# Patient Record
Sex: MALE | Race: WHITE | NOT HISPANIC OR LATINO | Employment: FULL TIME | URBAN - METROPOLITAN AREA
[De-identification: names, ages, dates, MRNs, and addresses within clinical notes are randomized per-mention and may not be internally consistent; named-entity substitution may affect disease eponyms.]

---

## 2023-11-17 DIAGNOSIS — I50.9 CONGESTIVE HEART FAILURE, UNSPECIFIED HF CHRONICITY, UNSPECIFIED HEART FAILURE TYPE (MULTI): Primary | ICD-10-CM

## 2023-11-20 RX ORDER — CARVEDILOL 3.12 MG/1
3.12 TABLET ORAL
Qty: 180 TABLET | Refills: 3 | Status: SHIPPED | OUTPATIENT
Start: 2023-11-20

## 2024-03-22 PROBLEM — R97.20 ELEVATED PSA: Status: ACTIVE | Noted: 2024-03-22

## 2024-03-22 PROBLEM — Z95.2 S/P TAVR (TRANSCATHETER AORTIC VALVE REPLACEMENT): Status: ACTIVE | Noted: 2024-03-22

## 2024-03-22 PROBLEM — F32.A DEPRESSION: Status: ACTIVE | Noted: 2024-03-22

## 2024-03-22 PROBLEM — H52.4 MYOPIA WITH PRESBYOPIA: Status: ACTIVE | Noted: 2024-03-22

## 2024-03-22 PROBLEM — D18.01 HEMANGIOMA OF SKIN AND SUBCUTANEOUS TISSUE: Status: ACTIVE | Noted: 2021-07-22

## 2024-03-22 PROBLEM — E05.90 HYPERTHYROIDISM: Status: ACTIVE | Noted: 2024-03-22

## 2024-03-22 PROBLEM — H52.13 MYOPIA OF BOTH EYES: Status: ACTIVE | Noted: 2024-03-22

## 2024-03-22 PROBLEM — H57.8A9 SENSATION OF FOREIGN BODY IN EYE: Status: ACTIVE | Noted: 2024-03-22

## 2024-03-22 PROBLEM — M79.2 NEUROPATHIC PAIN OF RIGHT LOWER EXTREMITY: Status: ACTIVE | Noted: 2024-03-22

## 2024-03-22 PROBLEM — L57.9 SKIN CHANGES DUE TO CHRONIC EXPOSURE TO NONIONIZING RADIATION, UNSPECIFIED: Status: ACTIVE | Noted: 2021-07-22

## 2024-03-22 PROBLEM — R51.9 HEADACHE: Status: ACTIVE | Noted: 2024-03-22

## 2024-03-22 PROBLEM — I25.10 ARTERIOSCLEROTIC CARDIOVASCULAR DISEASE (ASCVD): Status: ACTIVE | Noted: 2024-03-22

## 2024-03-22 PROBLEM — E78.00 HYPERCHOLESTEROLEMIA: Status: ACTIVE | Noted: 2024-03-22

## 2024-03-22 PROBLEM — H02.889 MGD (MEIBOMIAN GLAND DISEASE): Status: ACTIVE | Noted: 2024-03-22

## 2024-03-22 PROBLEM — R31.9 HEMATURIA: Status: ACTIVE | Noted: 2024-03-22

## 2024-03-22 PROBLEM — D22.39 MELANOCYTIC NEVI OF OTHER PARTS OF FACE: Status: ACTIVE | Noted: 2021-07-22

## 2024-03-22 PROBLEM — I10 ACCELERATED HYPERTENSION: Status: ACTIVE | Noted: 2024-03-22

## 2024-03-22 PROBLEM — I10 ESSENTIAL HYPERTENSION: Status: ACTIVE | Noted: 2024-03-22

## 2024-03-22 PROBLEM — H52.10 MYOPIA WITH PRESBYOPIA: Status: ACTIVE | Noted: 2024-03-22

## 2024-03-22 PROBLEM — S61.011A LACERATION OF RIGHT THUMB: Status: ACTIVE | Noted: 2024-03-22

## 2024-03-22 PROBLEM — M75.50 SUBACROMIAL BURSITIS: Status: ACTIVE | Noted: 2024-03-22

## 2024-03-22 PROBLEM — I69.359 HEMIPARESIS AFFECTING DOMINANT SIDE AS LATE EFFECT OF STROKE (MULTI): Status: ACTIVE | Noted: 2024-03-22

## 2024-03-22 PROBLEM — R10.9 ABDOMINAL PAIN: Status: ACTIVE | Noted: 2024-03-22

## 2024-03-22 PROBLEM — L82.0 INFLAMED SEBORRHEIC KERATOSIS: Status: ACTIVE | Noted: 2021-07-22

## 2024-03-22 PROBLEM — R91.1 PULMONARY NODULE: Status: ACTIVE | Noted: 2024-03-22

## 2024-03-22 PROBLEM — I63.9 STROKE (MULTI): Status: ACTIVE | Noted: 2024-03-22

## 2024-03-22 PROBLEM — M79.604 PAIN OF RIGHT LOWER EXTREMITY: Status: ACTIVE | Noted: 2024-03-22

## 2024-03-22 PROBLEM — H00.011 HORDEOLUM EXTERNUM OF RIGHT UPPER EYELID: Status: ACTIVE | Noted: 2024-03-22

## 2024-03-22 PROBLEM — I25.2 HISTORY OF NON-ST ELEVATION MYOCARDIAL INFARCTION (NSTEMI): Status: ACTIVE | Noted: 2024-03-22

## 2024-03-22 PROBLEM — S43.409A SPRAIN OF SHOULDER: Status: ACTIVE | Noted: 2024-03-22

## 2024-03-22 PROBLEM — N20.0 NEPHROLITHIASIS: Status: ACTIVE | Noted: 2024-03-22

## 2024-03-22 PROBLEM — N42.9 PROSTATE DISORDER: Status: ACTIVE | Noted: 2024-03-22

## 2024-03-22 PROBLEM — L82.1 OTHER SEBORRHEIC KERATOSIS: Status: ACTIVE | Noted: 2021-07-22

## 2024-03-22 RX ORDER — CHOLECALCIFEROL (VITAMIN D3) 25 MCG
1000 TABLET ORAL DAILY
COMMUNITY
Start: 2021-05-17

## 2024-03-22 RX ORDER — LOSARTAN POTASSIUM 50 MG/1
50 TABLET ORAL DAILY
COMMUNITY

## 2024-03-22 RX ORDER — ATORVASTATIN CALCIUM 80 MG/1
40 TABLET, FILM COATED ORAL NIGHTLY
COMMUNITY
End: 2024-06-06 | Stop reason: DRUGHIGH

## 2024-03-22 RX ORDER — NAPROXEN SODIUM 220 MG/1
1 TABLET, FILM COATED ORAL DAILY
COMMUNITY
Start: 2021-07-20

## 2024-03-22 RX ORDER — AMLODIPINE BESYLATE 5 MG/1
5 TABLET ORAL DAILY
COMMUNITY
End: 2024-05-10

## 2024-04-01 ENCOUNTER — OFFICE VISIT (OUTPATIENT)
Dept: PULMONOLOGY | Facility: CLINIC | Age: 69
End: 2024-04-01
Payer: MEDICARE

## 2024-04-01 ENCOUNTER — LAB (OUTPATIENT)
Dept: LAB | Facility: LAB | Age: 69
End: 2024-04-01
Payer: MEDICARE

## 2024-04-01 VITALS
HEART RATE: 53 BPM | SYSTOLIC BLOOD PRESSURE: 128 MMHG | HEIGHT: 70 IN | BODY MASS INDEX: 23.88 KG/M2 | OXYGEN SATURATION: 98 % | TEMPERATURE: 97.2 F | DIASTOLIC BLOOD PRESSURE: 57 MMHG | WEIGHT: 166.8 LBS

## 2024-04-01 DIAGNOSIS — H91.93 BILATERAL HEARING LOSS, UNSPECIFIED HEARING LOSS TYPE: Primary | ICD-10-CM

## 2024-04-01 DIAGNOSIS — I63.9 CEREBROVASCULAR ACCIDENT (CVA), UNSPECIFIED MECHANISM (MULTI): ICD-10-CM

## 2024-04-01 DIAGNOSIS — E78.00 HYPERCHOLESTEROLEMIA: ICD-10-CM

## 2024-04-01 DIAGNOSIS — R97.20 ELEVATED PSA: ICD-10-CM

## 2024-04-01 DIAGNOSIS — H91.93 BILATERAL HEARING LOSS, UNSPECIFIED HEARING LOSS TYPE: ICD-10-CM

## 2024-04-01 DIAGNOSIS — R31.9 HEMATURIA, UNSPECIFIED TYPE: ICD-10-CM

## 2024-04-01 DIAGNOSIS — I10 ACCELERATED HYPERTENSION: ICD-10-CM

## 2024-04-01 LAB
ALBUMIN SERPL BCP-MCNC: 4.2 G/DL (ref 3.4–5)
ALP SERPL-CCNC: 47 U/L (ref 33–136)
ALT SERPL W P-5'-P-CCNC: 16 U/L (ref 10–52)
ANION GAP SERPL CALC-SCNC: 15 MMOL/L (ref 10–20)
APPEARANCE UR: CLEAR
AST SERPL W P-5'-P-CCNC: 17 U/L (ref 9–39)
BILIRUB SERPL-MCNC: 1.1 MG/DL (ref 0–1.2)
BILIRUB UR STRIP.AUTO-MCNC: NEGATIVE MG/DL
BUN SERPL-MCNC: 23 MG/DL (ref 6–23)
CALCIUM SERPL-MCNC: 9.7 MG/DL (ref 8.6–10.6)
CHLORIDE SERPL-SCNC: 109 MMOL/L (ref 98–107)
CHOLEST SERPL-MCNC: 124 MG/DL (ref 0–199)
CHOLESTEROL/HDL RATIO: 2
CO2 SERPL-SCNC: 28 MMOL/L (ref 21–32)
COLOR UR: NORMAL
CREAT SERPL-MCNC: 1.19 MG/DL (ref 0.5–1.3)
EGFRCR SERPLBLD CKD-EPI 2021: 65 ML/MIN/1.73M*2
ERYTHROCYTE [DISTWIDTH] IN BLOOD BY AUTOMATED COUNT: 13.3 % (ref 11.5–14.5)
GLUCOSE SERPL-MCNC: 97 MG/DL (ref 74–99)
GLUCOSE UR STRIP.AUTO-MCNC: NORMAL MG/DL
HCT VFR BLD AUTO: 39.5 % (ref 41–52)
HDLC SERPL-MCNC: 60.8 MG/DL
HGB BLD-MCNC: 12.8 G/DL (ref 13.5–17.5)
KETONES UR STRIP.AUTO-MCNC: NEGATIVE MG/DL
LDLC SERPL CALC-MCNC: 55 MG/DL
LEUKOCYTE ESTERASE UR QL STRIP.AUTO: NEGATIVE
MCH RBC QN AUTO: 32.7 PG (ref 26–34)
MCHC RBC AUTO-ENTMCNC: 32.4 G/DL (ref 32–36)
MCV RBC AUTO: 101 FL (ref 80–100)
NITRITE UR QL STRIP.AUTO: NEGATIVE
NON HDL CHOLESTEROL: 63 MG/DL (ref 0–149)
NRBC BLD-RTO: 0 /100 WBCS (ref 0–0)
PH UR STRIP.AUTO: 5 [PH]
PLATELET # BLD AUTO: 149 X10*3/UL (ref 150–450)
POTASSIUM SERPL-SCNC: 5.6 MMOL/L (ref 3.5–5.3)
PROT SERPL-MCNC: 6.4 G/DL (ref 6.4–8.2)
PROT UR STRIP.AUTO-MCNC: NEGATIVE MG/DL
PSA SERPL-MCNC: 1.91 NG/ML
RBC # BLD AUTO: 3.91 X10*6/UL (ref 4.5–5.9)
RBC # UR STRIP.AUTO: NEGATIVE /UL
SODIUM SERPL-SCNC: 146 MMOL/L (ref 136–145)
SP GR UR STRIP.AUTO: 1.02
TRIGL SERPL-MCNC: 43 MG/DL (ref 0–149)
TSH SERPL-ACNC: 1.92 MIU/L (ref 0.44–3.98)
UROBILINOGEN UR STRIP.AUTO-MCNC: NORMAL MG/DL
VLDL: 9 MG/DL (ref 0–40)
WBC # BLD AUTO: 6.6 X10*3/UL (ref 4.4–11.3)

## 2024-04-01 PROCEDURE — 85027 COMPLETE CBC AUTOMATED: CPT

## 2024-04-01 PROCEDURE — 84153 ASSAY OF PSA TOTAL: CPT

## 2024-04-01 PROCEDURE — 3074F SYST BP LT 130 MM HG: CPT | Performed by: INTERNAL MEDICINE

## 2024-04-01 PROCEDURE — 80053 COMPREHEN METABOLIC PANEL: CPT

## 2024-04-01 PROCEDURE — 84443 ASSAY THYROID STIM HORMONE: CPT

## 2024-04-01 PROCEDURE — 80061 LIPID PANEL: CPT

## 2024-04-01 PROCEDURE — 36415 COLL VENOUS BLD VENIPUNCTURE: CPT

## 2024-04-01 PROCEDURE — 1036F TOBACCO NON-USER: CPT | Performed by: INTERNAL MEDICINE

## 2024-04-01 PROCEDURE — 3078F DIAST BP <80 MM HG: CPT | Performed by: INTERNAL MEDICINE

## 2024-04-01 PROCEDURE — 81003 URINALYSIS AUTO W/O SCOPE: CPT

## 2024-04-01 PROCEDURE — 99214 OFFICE O/P EST MOD 30 MIN: CPT | Performed by: INTERNAL MEDICINE

## 2024-04-01 PROCEDURE — 1159F MED LIST DOCD IN RCRD: CPT | Performed by: INTERNAL MEDICINE

## 2024-04-01 PROCEDURE — 1160F RVW MEDS BY RX/DR IN RCRD: CPT | Performed by: INTERNAL MEDICINE

## 2024-04-01 ASSESSMENT — ENCOUNTER SYMPTOMS
HEMATURIA: 0
BRUISES/BLEEDS EASILY: 0
EYE DISCHARGE: 0
UNEXPECTED WEIGHT CHANGE: 0
CHOKING: 0
PALPITATIONS: 0
SINUS PAIN: 0
ADENOPATHY: 0
DYSURIA: 0
ARTHRALGIAS: 0
APNEA: 0
AGITATION: 0
WEAKNESS: 0
STRIDOR: 0
FATIGUE: 0
SLEEP DISTURBANCE: 0
RHINORRHEA: 0
JOINT SWELLING: 0
TREMORS: 0
ABDOMINAL PAIN: 0
COUGH: 0
NUMBNESS: 0
DIFFICULTY URINATING: 0
SINUS PRESSURE: 0
FEVER: 0
ABDOMINAL DISTENTION: 0
SPEECH DIFFICULTY: 0
WHEEZING: 0
NERVOUS/ANXIOUS: 0
SHORTNESS OF BREATH: 0
SORE THROAT: 1
LIGHT-HEADEDNESS: 0
FREQUENCY: 0
CONSTIPATION: 0
HEADACHES: 0
FACIAL SWELLING: 0
NAUSEA: 0
EYE REDNESS: 0
DIZZINESS: 0

## 2024-04-01 NOTE — PROGRESS NOTES
@PULMONARY FOLLOW-UP@       PROBLEM: multiple jazmin issues    ASSESSMENT:  The patient is a 69-year-old with a history of BRACA positivity and a history of Graves' disease and hyperthyroidism.  He had a hemorrhagic stroke in 2016 and a myocardial infarction in 1999 with a acute plaque rupture.  He had a TAVR performed in June 2021 and after the procedure had intra ventricular bleed and was observed by neurosurgery without intervention.  He has been stable since then but has not had any preventative care.  He is complaining of decrease in hearing and his last colonoscopy was 10 years ago.  I discussed with him that he really needs a primary care physician to follow all of his multiple health issues.  He definitely needs regular follow-up.    PLAN:  Would obtain complete labs    Would see Edith Means for hearing       Would see  Dr. Antoine Medeiros as a primary internist     You should follow up with Dr. Lai for your heart     He is over due for colonoscopy in view of his BRACA positivity       HISTORY OF PRESENT ILLNESS:  The patient is a 69-year-old with a history of Graves' disease and hyperthyroidism, hemorrhagic stroke with right hemiparesis in 2016, stable pulmonary nodule, history of CAD with a prior myocardial infarction 1999 with acute plaque rupture in the LAD. Also, he is BRACA positive. He also has hypertension hyperlipidemia with aortic stenosis undergoing a TAVR on June 29, 2021. He subsequently presented on July 18, 2021 with 3 days of headache and nausea, body aches and low-grade fever. He was determined to have an intraventricular bleed and was admitted to the neurosurgical intensive care unit for observation. His Plavix was held and he was stable after 2 days and was discharged on July 20, 2021. Around 3 days later he was having increasing and persistent headache and a repeat CT scan was obtained on July 23, 2021 which revealed decreasing intraventricular blood. At that point it was  felt that he was stable to resume his Plavix.  It was noted on September 23, 2022 he had COVID and was treated with Paxlovid.  He has also noted that the TAVR CT scan from 8/12/2021 revealed pulm nodularity unchanged compared to 2017.    The patient reports that he has not been seen in follow up since his TAVR several years ago.  He has been concerned about COVID and hence hasn't gone to clinicians.  He had a slight tickle in his throat about a week ago but has not come down with any infection.  He has no chest pains or pressures PND orthopnea.  He has no swelling of his ankles.  His weight is stable.  He has some decrease in hearing and was concerned about that.  He has not had any labs for a long time and he has not had a colonoscopy for 10 years.  He did have COVID about a year and a half ago.  He had a pulmonary nodule and on his last CT scan from 2021 it was noted that he had not changed compared to 2017 and no further scanning was needed.  He has had an elevated PSA in the past.      Allergies   Allergen Reactions    Penicillin Unknown          Current Outpatient Medications:     amLODIPine (Norvasc) 5 mg tablet, Take 1 tablet (5 mg) by mouth once daily., Disp: , Rfl:     aspirin 81 mg chewable tablet, Chew 1 tablet (81 mg) once daily., Disp: , Rfl:     atorvastatin (Lipitor) 80 mg tablet, Take 1 tablet (80 mg) by mouth once daily at bedtime., Disp: , Rfl:     carvedilol (Coreg) 3.125 mg tablet, TAKE 1 TABLET BY MOUTH TWICE A DAY WITH MEALS, Disp: 180 tablet, Rfl: 3    cholecalciferol (Vitamin D-3) 25 MCG (1000 UT) tablet, Take 1 tablet (1,000 Units) by mouth once daily., Disp: , Rfl:     citalopram (CeleXA) 20 mg tablet, TAKE 1 TABLET BY MOUTH EVERY DAY, Disp: 90 tablet, Rfl: 3    losartan (Cozaar) 50 mg tablet, Take 1 tablet (50 mg) by mouth once daily., Disp: , Rfl:        Review of Systems   Constitutional:  Negative for fatigue, fever and unexpected weight change.   HENT:  Positive for hearing loss and  sore throat. Negative for congestion, facial swelling, nosebleeds, postnasal drip, rhinorrhea, sinus pressure and sinus pain.    Eyes:  Negative for discharge, redness and visual disturbance.   Respiratory:  Negative for apnea, cough, choking, shortness of breath, wheezing and stridor.    Cardiovascular:  Negative for chest pain, palpitations and leg swelling.   Gastrointestinal:  Negative for abdominal distention, abdominal pain, constipation and nausea.   Endocrine: Negative for cold intolerance and heat intolerance.   Genitourinary:  Negative for difficulty urinating, dysuria, frequency and hematuria.   Musculoskeletal:  Negative for arthralgias, gait problem and joint swelling.   Allergic/Immunologic: Negative for environmental allergies, food allergies and immunocompromised state.   Neurological:  Negative for dizziness, tremors, syncope, speech difficulty, weakness, light-headedness, numbness and headaches.   Hematological:  Negative for adenopathy. Does not bruise/bleed easily.   Psychiatric/Behavioral:  Negative for agitation, behavioral problems and sleep disturbance. The patient is not nervous/anxious.         Vitals:    04/01/24 0857   BP: 128/57   Pulse: 53   Temp: 36.2 °C (97.2 °F)   SpO2: 98%        Physical Exam  Vitals reviewed.   Constitutional:       Appearance: Normal appearance.   HENT:      Head: Normocephalic and atraumatic.      Right Ear: There is impacted cerumen.   Eyes:      Extraocular Movements: Extraocular movements intact.   Cardiovascular:      Rate and Rhythm: Normal rate and regular rhythm.      Heart sounds: No murmur heard.     No friction rub. No gallop.   Pulmonary:      Effort: Pulmonary effort is normal. No respiratory distress.      Breath sounds: Normal breath sounds. No stridor. No wheezing, rhonchi or rales.   Chest:      Chest wall: No tenderness.   Abdominal:      General: Abdomen is flat. There is no distension.      Palpations: Abdomen is soft. There is no mass.       Tenderness: There is no abdominal tenderness.   Musculoskeletal:         General: Normal range of motion.      Cervical back: Normal range of motion.      Right lower leg: No edema.      Left lower leg: No edema.   Skin:     General: Skin is warm and dry.   Neurological:      Mental Status: He is alert and oriented to person, place, and time.      Motor: Weakness present.   Psychiatric:         Mood and Affect: Mood normal.         Behavior: Behavior normal.

## 2024-04-01 NOTE — PATIENT INSTRUCTIONS
Would obtain complete labs    Would see Edith Santos for hearing       Would see  Dr. Antoine Medeiros as a primary internist     You should follow up with Dr. Lai for your heart

## 2024-04-04 DIAGNOSIS — E87.5 HYPERKALEMIA: Primary | ICD-10-CM

## 2024-05-10 DIAGNOSIS — I10 ESSENTIAL HYPERTENSION: Primary | ICD-10-CM

## 2024-05-10 RX ORDER — AMLODIPINE BESYLATE 5 MG/1
5 TABLET ORAL DAILY
Qty: 90 TABLET | Refills: 3 | Status: SHIPPED | OUTPATIENT
Start: 2024-05-10

## 2024-06-05 NOTE — PROGRESS NOTES
Primary Care Physician: Alejandro Villeda MD MPH  Date of Visit: 06/06/2024  8:20 AM EDT  Location of visit: [facility] [address]   Last office visit: August 9, 2021    Chief Complaint:     Follow-up CAD, status post TAVR (3-year)    HPI/Summary  Nasir Camacho is a 69 y.o. male who presents for followup cardiology evaluation.     In 1999, the patient sustained non-STEMI secondary to acute LAD plaque rupture, and underwent PCI stent.  In 2016, he presented with hemorrhagic stroke and right hemiparesis.  In 2021, he presented with progressive aortic stenosis.  Catheterization showed mild nonobstructive CAD.  He underwent TAVR on June 29, 2021.  Unfortunately, he was readmitted 3 weeks later with intraventricular hemorrhage.  He did not require acute neurosurgical treatment.  However, he did require another emergency room visit for headache, associated with a markedly elevated blood pressure.  At his last visit, we added amlodipine.  No recent cardiology follow-up.  He was seen by pulmonary medicine in April, 2024 and was advised to return to cardiology clinic and also to establish with a primary care provider.    He is now on amlodipine 5 mg daily, low-dose aspirin, high-dose atorvastatin, carvedilol 3.125 mg twice daily, and losartan 50 mg daily.    Feels fine. No angina. Exercise is limited. Back at a part-time job. Does have a gym membership. TM but not recently. Weights.  Compliant with medications.      Specialty Problems          Cardiology Problems    Accelerated hypertension    Arteriosclerotic cardiovascular disease (ASCVD)    Essential hypertension    History of non-ST elevation myocardial infarction (NSTEMI)    Hypercholesterolemia    S/P TAVR (transcatheter aortic valve replacement)        Past Medical History:   Diagnosis Date    Personal history of other diseases of the circulatory system 05/17/2021    History of aortic valve stenosis    Personal history of other endocrine, nutritional and metabolic disease   "   History of Graves' disease          Past Surgical History:   Procedure Laterality Date    ANKLE SURGERY  10/06/2014    Ankle Surgery    CORONARY ANGIOPLASTY WITH STENT PLACEMENT  10/06/2014    Cath Stent Placement    CT ANGIO NECK  7/18/2021    CT NECK ANGIO W AND WO IV CONTRAST 7/18/2021 [facility] CLINICAL LEGACY    CT HEAD ANGIO W AND WO IV CONTRAST  7/18/2021    CT HEAD ANGIO W AND WO IV CONTRAST 7/18/2021 [facility] CLINICAL LEGACY    FEMUR FRACTURE SURGERY  11/06/2014    Femur Repair            Social History     Tobacco Use    Smoking status: Never    Smokeless tobacco: Never   Substance Use Topics    Alcohol use: Yes     Comment: one drink weekly    Drug use: Never                 Allergies   Allergen Reactions    Penicillin Unknown         Current Outpatient Medications   Medication Instructions    amLODIPine (NORVASC) 5 mg, oral, Daily    aspirin 81 mg chewable tablet 1 tablet, oral, Daily    atorvastatin (LIPITOR) 40 mg, oral, Daily    carvedilol (COREG) 3.125 mg, oral, 2 times daily (morning and late afternoon)    cholecalciferol (VITAMIN D-3) 1,000 Units, oral, Daily    citalopram (CELEXA) 20 mg, oral, Daily    losartan (COZAAR) 50 mg, oral, Daily       ROS     Vital Signs:  Vitals:    06/06/24 0829   BP: 115/58   BP Location: Left arm   Patient Position: Sitting   BP Cuff Size: Large adult   Pulse: 53   SpO2: 97%   Weight: 74.3 kg (163 lb 14.4 oz)   Height: 1.791 m (5' 10.5\")     Wt Readings from Last 2 Encounters:   06/06/24 74.3 kg (163 lb 14.4 oz)   04/01/24 75.7 kg (166 lb 12.8 oz)     Body mass index is 23.18 kg/m².       Physical Exam:    He was not in any acute distress.  Voice no complaints.  Lung fields clear.  Left carotid bruit, newly recognized.  Heart sounds regular.  Grade 2 systolic murmur across aortic bioprosthesis, second right interspace.  No diastolic murmur.  No S3.  No edema.     Last Labs:  CMP:  Recent Labs     04/01/24  1010 08/05/21  1322 07/19/21  0519 07/18/21  1016 07/06/21  0812 "   * 142 139 142 141   K 5.6* 4.3 3.8 4.3 4.1   * 108* 106 108* 106   CO2 28 29 22 25 27   ANIONGAP 15 9* 15 13 12   BUN 23 21 17 18 24*   CREATININE 1.19 1.04 0.96 1.12 1.30   EGFR 65  --   --   --   --    GLUCOSE 97 90 116* 132* 121*     Recent Labs     04/01/24  1010 08/05/21  1322 07/19/21  0519 07/18/21  1016 05/18/21  0732 04/23/21  1400   ALBUMIN 4.2 4.0 4.2 4.2 4.4 4.2   ALKPHOS 47 40  --  54 51 53   ALT 16 15  --  13 19 17   AST 17 16  --  15 17 19   BILITOT 1.1 1.1  --  1.3* 1.2 1.1     CBC:  Recent Labs     04/01/24  1010 08/05/21  1322 07/19/21  0519 07/18/21  1016 07/06/21  0812   WBC 6.6 5.6 8.1 9.5 5.7   HGB 12.8* 11.7* 12.0* 12.0* 11.7*   HCT 39.5* 35.6* 35.5* 33.6* 35.0*   * 134* 148* 155 147*   * 99 96 93 101*     COAG:   Recent Labs     07/19/21  0519 07/18/21  1251 07/18/21  1016 07/06/21  0812 06/30/21  0444 06/29/21  1005   INR 1.1 1.1  --  1.0 1.1 1.1   DDIMERVTE  --   --  1,002*  --   --   --      HEME/ENDO:  Recent Labs     04/01/24  1010 03/29/21  0800 04/01/19  1648 10/01/18  1633   TSH 1.92 1.74 2.15 1.21      CARDIAC:   Recent Labs     07/18/21  1016   *     Recent Labs     04/01/24  1010 03/29/21  0800 06/25/18  0730   CHOL 124 127 131   LDLF  --  60 65   HDL 60.8 53.0 57.3   TRIG 43 70 43       Last Cardiology Tests:    ECG:    Sinus bradycardia, 53/min otherwise normal ECG.    Echo:  Echo Results:  No results found for this or any previous visit from the past 3650 days.       Cath:      Stress Test:  Stress Results:  No results found for this or any previous visit from the past 365 days.         Cardiac Imaging:        Assessment/Plan     In summary, the patient is status post remote non-STEMI.  He is now 3 years status post-TAVR.  The echocardiogram 1 year following TAVR showed normal function of the aortic valve with mild aortic regurgitation.  On examination, he does have a soft carotid bruit, could be a transmitted murmur.  We will check a carotid  duplex and we will repeat an echocardiogram.  Blood pressures are satisfactory, lipids are at goal, and he is on an appropriate medication regimen.  We discussed the importance of regular exercise.  He is limited by mild hemiparesis following stroke.  We will see him at annual intervals.      Orders:  Orders Placed This Encounter   Procedures    ECG 12 lead (Clinic Performed)    Transthoracic Echo (TTE) Complete      Followup Appts:  Future Appointments   Date Time Provider Department Center   7/1/2024 10:00 AM TRANG Jo, CCC-A [id number] [facility]   7/1/2024 10:40 AM Edith Santos APRN-CNP [id number] [facility]           ____________________________________________________________  Carroll Lai MD    Senior Attending Physician  [facility]  [facility]    [facility] Chair for Cardiovascular Excellence  [employer]

## 2024-06-06 ENCOUNTER — OFFICE VISIT (OUTPATIENT)
Dept: CARDIOLOGY | Facility: CLINIC | Age: 69
End: 2024-06-06
Payer: MEDICARE

## 2024-06-06 VITALS
DIASTOLIC BLOOD PRESSURE: 58 MMHG | BODY MASS INDEX: 22.94 KG/M2 | HEIGHT: 71 IN | OXYGEN SATURATION: 97 % | WEIGHT: 163.9 LBS | SYSTOLIC BLOOD PRESSURE: 115 MMHG | HEART RATE: 53 BPM

## 2024-06-06 DIAGNOSIS — Z95.2 S/P TAVR (TRANSCATHETER AORTIC VALVE REPLACEMENT): ICD-10-CM

## 2024-06-06 DIAGNOSIS — I25.2 HISTORY OF NON-ST ELEVATION MYOCARDIAL INFARCTION (NSTEMI): ICD-10-CM

## 2024-06-06 DIAGNOSIS — I10 ESSENTIAL HYPERTENSION: ICD-10-CM

## 2024-06-06 DIAGNOSIS — I25.10 ARTERIOSCLEROTIC CARDIOVASCULAR DISEASE (ASCVD): ICD-10-CM

## 2024-06-06 DIAGNOSIS — R09.89 BRUIT OF LEFT CAROTID ARTERY: Primary | ICD-10-CM

## 2024-06-06 PROCEDURE — 1126F AMNT PAIN NOTED NONE PRSNT: CPT | Performed by: INTERNAL MEDICINE

## 2024-06-06 PROCEDURE — 93005 ELECTROCARDIOGRAM TRACING: CPT | Performed by: INTERNAL MEDICINE

## 2024-06-06 PROCEDURE — G2211 COMPLEX E/M VISIT ADD ON: HCPCS | Performed by: INTERNAL MEDICINE

## 2024-06-06 PROCEDURE — 1036F TOBACCO NON-USER: CPT | Performed by: INTERNAL MEDICINE

## 2024-06-06 PROCEDURE — 3078F DIAST BP <80 MM HG: CPT | Performed by: INTERNAL MEDICINE

## 2024-06-06 PROCEDURE — 99214 OFFICE O/P EST MOD 30 MIN: CPT | Performed by: INTERNAL MEDICINE

## 2024-06-06 PROCEDURE — 1159F MED LIST DOCD IN RCRD: CPT | Performed by: INTERNAL MEDICINE

## 2024-06-06 PROCEDURE — 3074F SYST BP LT 130 MM HG: CPT | Performed by: INTERNAL MEDICINE

## 2024-06-06 RX ORDER — ATORVASTATIN CALCIUM 40 MG/1
40 TABLET, FILM COATED ORAL DAILY
Qty: 90 TABLET | Refills: 3 | Status: SHIPPED | OUTPATIENT
Start: 2024-06-06 | End: 2025-06-06

## 2024-06-06 ASSESSMENT — ENCOUNTER SYMPTOMS
OCCASIONAL FEELINGS OF UNSTEADINESS: 1
DEPRESSION: 0
LOSS OF SENSATION IN FEET: 1

## 2024-06-06 ASSESSMENT — PATIENT HEALTH QUESTIONNAIRE - PHQ9
SUM OF ALL RESPONSES TO PHQ9 QUESTIONS 1 AND 2: 0
2. FEELING DOWN, DEPRESSED OR HOPELESS: NOT AT ALL
1. LITTLE INTEREST OR PLEASURE IN DOING THINGS: NOT AT ALL

## 2024-06-06 ASSESSMENT — COLUMBIA-SUICIDE SEVERITY RATING SCALE - C-SSRS
1. IN THE PAST MONTH, HAVE YOU WISHED YOU WERE DEAD OR WISHED YOU COULD GO TO SLEEP AND NOT WAKE UP?: NO
2. HAVE YOU ACTUALLY HAD ANY THOUGHTS OF KILLING YOURSELF?: NO
6. HAVE YOU EVER DONE ANYTHING, STARTED TO DO ANYTHING, OR PREPARED TO DO ANYTHING TO END YOUR LIFE?: NO

## 2024-06-06 ASSESSMENT — PAIN SCALES - GENERAL: PAINLEVEL: 0-NO PAIN

## 2024-06-06 NOTE — PATIENT INSTRUCTIONS
We have scheduled a carotid ultrasound and a repeat echocardiogram, to reassess the function of the TAVR valve.  Continue all medications as prescribed.  Try to resume a moderate intensity exercise regimen, and start with walking for 10 to 15 minutes daily.  Establish care with a new primary care provider.  We will send you a [employer] message with the test results.  Feel free to call us or send us a message with any questions/concerns.  1 year follow-up recommended.

## 2024-06-14 LAB
ATRIAL RATE: 53 BPM
P AXIS: 48 DEGREES
P OFFSET: 192 MS
P ONSET: 140 MS
PR INTERVAL: 154 MS
Q ONSET: 217 MS
QRS COUNT: 9 BEATS
QRS DURATION: 98 MS
QT INTERVAL: 434 MS
QTC CALCULATION(BAZETT): 407 MS
QTC FREDERICIA: 416 MS
R AXIS: 52 DEGREES
T AXIS: 44 DEGREES
T OFFSET: 434 MS
VENTRICULAR RATE: 53 BPM

## 2024-06-20 ENCOUNTER — TELEPHONE (OUTPATIENT)
Dept: CARDIOLOGY | Facility: CLINIC | Age: 69
End: 2024-06-20
Payer: MEDICARE

## 2024-06-20 NOTE — TELEPHONE ENCOUNTER
Copied from CRM #[id number]. Topic: Information Request - Trying to reach PCP  >> Jun 19, 2024  3:56 PM Phi MCKEON wrote:  Pt needs a new order for transthoracic ECHO and VASC US Carotid artery duplex bilateral to reschedule.   Thank you.

## 2024-06-23 ENCOUNTER — APPOINTMENT (OUTPATIENT)
Dept: RADIOLOGY | Facility: HOSPITAL | Age: 69
End: 2024-06-23
Payer: MEDICARE

## 2024-06-23 ENCOUNTER — HOSPITAL ENCOUNTER (INPATIENT)
Facility: HOSPITAL | Age: 69
LOS: 2 days | Discharge: NURSING, REHAB OR LONG-TERM CARE | End: 2024-06-26
Attending: EMERGENCY MEDICINE | Admitting: FAMILY MEDICINE
Payer: MEDICARE

## 2024-06-23 DIAGNOSIS — S32.9XXA CLOSED NONDISPLACED FRACTURE OF PELVIS, UNSPECIFIED PART OF PELVIS, INITIAL ENCOUNTER (MULTI): ICD-10-CM

## 2024-06-23 DIAGNOSIS — S32.411A: ICD-10-CM

## 2024-06-23 DIAGNOSIS — S32.401A CLOSED DISPLACED FRACTURE OF RIGHT ACETABULUM, UNSPECIFIED PORTION OF ACETABULUM, INITIAL ENCOUNTER (MULTI): Primary | ICD-10-CM

## 2024-06-23 LAB
BASOPHILS # BLD AUTO: 0.03 X10*3/UL (ref 0–0.1)
BASOPHILS NFR BLD AUTO: 0.3 %
EOSINOPHIL # BLD AUTO: 0.1 X10*3/UL (ref 0–0.4)
EOSINOPHIL NFR BLD AUTO: 1 %
ERYTHROCYTE [DISTWIDTH] IN BLOOD BY AUTOMATED COUNT: 13.2 % (ref 11.5–14.5)
HCT VFR BLD AUTO: 34.9 % (ref 41–52)
HGB BLD-MCNC: 11.4 G/DL (ref 13.5–17.5)
IMM GRANULOCYTES # BLD AUTO: 0.03 X10*3/UL (ref 0–0.5)
IMM GRANULOCYTES NFR BLD AUTO: 0.3 % (ref 0–0.9)
INR PPP: 1.1 (ref 0.9–1.1)
LYMPHOCYTES # BLD AUTO: 1.62 X10*3/UL (ref 0.8–3)
LYMPHOCYTES NFR BLD AUTO: 16.7 %
MCH RBC QN AUTO: 32.4 PG (ref 26–34)
MCHC RBC AUTO-ENTMCNC: 32.7 G/DL (ref 32–36)
MCV RBC AUTO: 99 FL (ref 80–100)
MONOCYTES # BLD AUTO: 0.68 X10*3/UL (ref 0.05–0.8)
MONOCYTES NFR BLD AUTO: 7 %
NEUTROPHILS # BLD AUTO: 7.26 X10*3/UL (ref 1.6–5.5)
NEUTROPHILS NFR BLD AUTO: 74.7 %
NRBC BLD-RTO: 0 /100 WBCS (ref 0–0)
PLATELET # BLD AUTO: 127 X10*3/UL (ref 150–450)
PROTHROMBIN TIME: 12.2 SECONDS (ref 9.8–12.8)
RBC # BLD AUTO: 3.52 X10*6/UL (ref 4.5–5.9)
WBC # BLD AUTO: 9.7 X10*3/UL (ref 4.4–11.3)

## 2024-06-23 PROCEDURE — 2500000004 HC RX 250 GENERAL PHARMACY W/ HCPCS (ALT 636 FOR OP/ED): Performed by: EMERGENCY MEDICINE

## 2024-06-23 PROCEDURE — 73560 X-RAY EXAM OF KNEE 1 OR 2: CPT | Mod: RIGHT SIDE | Performed by: STUDENT IN AN ORGANIZED HEALTH CARE EDUCATION/TRAINING PROGRAM

## 2024-06-23 PROCEDURE — 73700 CT LOWER EXTREMITY W/O DYE: CPT | Mod: RT

## 2024-06-23 PROCEDURE — 72192 CT PELVIS W/O DYE: CPT | Performed by: RADIOLOGY

## 2024-06-23 PROCEDURE — 85025 COMPLETE CBC W/AUTO DIFF WBC: CPT | Performed by: EMERGENCY MEDICINE

## 2024-06-23 PROCEDURE — 72125 CT NECK SPINE W/O DYE: CPT

## 2024-06-23 PROCEDURE — 80053 COMPREHEN METABOLIC PANEL: CPT | Performed by: EMERGENCY MEDICINE

## 2024-06-23 PROCEDURE — 73552 X-RAY EXAM OF FEMUR 2/>: CPT | Mod: RIGHT SIDE | Performed by: STUDENT IN AN ORGANIZED HEALTH CARE EDUCATION/TRAINING PROGRAM

## 2024-06-23 PROCEDURE — 73552 X-RAY EXAM OF FEMUR 2/>: CPT | Mod: RT

## 2024-06-23 PROCEDURE — 85610 PROTHROMBIN TIME: CPT | Performed by: EMERGENCY MEDICINE

## 2024-06-23 PROCEDURE — 73560 X-RAY EXAM OF KNEE 1 OR 2: CPT | Mod: RT

## 2024-06-23 PROCEDURE — 72131 CT LUMBAR SPINE W/O DYE: CPT | Performed by: RADIOLOGY

## 2024-06-23 PROCEDURE — 70450 CT HEAD/BRAIN W/O DYE: CPT | Performed by: RADIOLOGY

## 2024-06-23 PROCEDURE — 70450 CT HEAD/BRAIN W/O DYE: CPT

## 2024-06-23 PROCEDURE — 2500000001 HC RX 250 WO HCPCS SELF ADMINISTERED DRUGS (ALT 637 FOR MEDICARE OP): Performed by: EMERGENCY MEDICINE

## 2024-06-23 PROCEDURE — 96372 THER/PROPH/DIAG INJ SC/IM: CPT | Performed by: EMERGENCY MEDICINE

## 2024-06-23 PROCEDURE — 36415 COLL VENOUS BLD VENIPUNCTURE: CPT | Performed by: EMERGENCY MEDICINE

## 2024-06-23 PROCEDURE — 73700 CT LOWER EXTREMITY W/O DYE: CPT | Performed by: RADIOLOGY

## 2024-06-23 PROCEDURE — 73502 X-RAY EXAM HIP UNI 2-3 VIEWS: CPT | Mod: RIGHT SIDE | Performed by: STUDENT IN AN ORGANIZED HEALTH CARE EDUCATION/TRAINING PROGRAM

## 2024-06-23 PROCEDURE — 72125 CT NECK SPINE W/O DYE: CPT | Performed by: RADIOLOGY

## 2024-06-23 PROCEDURE — 72131 CT LUMBAR SPINE W/O DYE: CPT

## 2024-06-23 PROCEDURE — 72192 CT PELVIS W/O DYE: CPT

## 2024-06-23 PROCEDURE — 99285 EMERGENCY DEPT VISIT HI MDM: CPT

## 2024-06-23 PROCEDURE — 73502 X-RAY EXAM HIP UNI 2-3 VIEWS: CPT | Mod: RT

## 2024-06-23 RX ORDER — METHOCARBAMOL 100 MG/ML
1000 INJECTION, SOLUTION INTRAMUSCULAR; INTRAVENOUS ONCE
Status: DISCONTINUED | OUTPATIENT
Start: 2024-06-23 | End: 2024-06-23

## 2024-06-23 RX ORDER — CYCLOBENZAPRINE HCL 10 MG
10 TABLET ORAL ONCE
Status: COMPLETED | OUTPATIENT
Start: 2024-06-23 | End: 2024-06-23

## 2024-06-23 RX ORDER — ACETAMINOPHEN 325 MG/1
975 TABLET ORAL ONCE
Status: COMPLETED | OUTPATIENT
Start: 2024-06-23 | End: 2024-06-23

## 2024-06-23 RX ORDER — KETOROLAC TROMETHAMINE 30 MG/ML
15 INJECTION, SOLUTION INTRAMUSCULAR; INTRAVENOUS ONCE
Status: COMPLETED | OUTPATIENT
Start: 2024-06-23 | End: 2024-06-23

## 2024-06-23 RX ORDER — BACLOFEN 10 MG/1
10 TABLET ORAL ONCE
Status: COMPLETED | OUTPATIENT
Start: 2024-06-23 | End: 2024-06-23

## 2024-06-23 ASSESSMENT — COLUMBIA-SUICIDE SEVERITY RATING SCALE - C-SSRS
6. HAVE YOU EVER DONE ANYTHING, STARTED TO DO ANYTHING, OR PREPARED TO DO ANYTHING TO END YOUR LIFE?: NO
1. IN THE PAST MONTH, HAVE YOU WISHED YOU WERE DEAD OR WISHED YOU COULD GO TO SLEEP AND NOT WAKE UP?: NO
2. HAVE YOU ACTUALLY HAD ANY THOUGHTS OF KILLING YOURSELF?: NO

## 2024-06-23 ASSESSMENT — PAIN DESCRIPTION - ORIENTATION
ORIENTATION: RIGHT
ORIENTATION: RIGHT

## 2024-06-23 ASSESSMENT — PAIN DESCRIPTION - LOCATION
LOCATION: HIP
LOCATION: HIP

## 2024-06-23 ASSESSMENT — PAIN SCALES - GENERAL
PAINLEVEL_OUTOF10: 0 - NO PAIN
PAINLEVEL_OUTOF10: 5 - MODERATE PAIN
PAINLEVEL_OUTOF10: 7

## 2024-06-23 ASSESSMENT — PAIN DESCRIPTION - DESCRIPTORS: DESCRIPTORS: ACHING

## 2024-06-23 ASSESSMENT — PAIN DESCRIPTION - FREQUENCY: FREQUENCY: CONSTANT/CONTINUOUS

## 2024-06-23 ASSESSMENT — PAIN - FUNCTIONAL ASSESSMENT: PAIN_FUNCTIONAL_ASSESSMENT: 0-10

## 2024-06-23 ASSESSMENT — PAIN DESCRIPTION - PAIN TYPE: TYPE: ACUTE PAIN

## 2024-06-23 NOTE — ED PROVIDER NOTES
HPI   Chief Complaint   Patient presents with    Fall    Hip Pain     Pt states he was on a counter and fell off approx 3 ft. Pt c/o right hip/leg pain. Pt denies head injury or LOC.        69-year-old male with history of aortic stenosis status post TAVR, CAD, Graves' disease, prior hemorrhagic stroke with residual right-sided weakness and intermittent aphasia presenting after mechanical fall.  He was standing on a desk trying to change a light bulb, fell onto his right side.  Is endorsing pain in the right hip and right thigh.  Denies a head strike.                          Karen Coma Scale Score: 15                     Patient History   Past Medical History:   Diagnosis Date    Personal history of other diseases of the circulatory system 05/17/2021    History of aortic valve stenosis    Personal history of other endocrine, nutritional and metabolic disease     History of Graves' disease     Past Surgical History:   Procedure Laterality Date    ANKLE SURGERY  10/06/2014    Ankle Surgery    CORONARY ANGIOPLASTY WITH STENT PLACEMENT  10/06/2014    Cath Stent Placement    CT ANGIO NECK  7/18/2021    CT NECK ANGIO W AND WO IV CONTRAST 7/18/2021 [facility] CLINICAL LEGACY    CT HEAD ANGIO W AND WO IV CONTRAST  7/18/2021    CT HEAD ANGIO W AND WO IV CONTRAST 7/18/2021 [facility] CLINICAL LEGACY    FEMUR FRACTURE SURGERY  11/06/2014    Femur Repair     No family history on file.  Social History     Tobacco Use    Smoking status: Never    Smokeless tobacco: Never   Substance Use Topics    Alcohol use: Yes     Comment: one drink weekly    Drug use: Never       Physical Exam   ED Triage Vitals [06/23/24 1806]   Temperature Heart Rate Respirations BP   36.8 °C (98.2 °F) 62 16 108/63      Pulse Ox Temp Source Heart Rate Source Patient Position   97 % Temporal Monitor --      BP Location FiO2 (%)     Right arm --       Physical Exam  Vitals and nursing note reviewed.   Constitutional:       Appearance: Normal appearance.   HENT:       Head: Normocephalic and atraumatic.      Right Ear: Tympanic membrane normal.      Left Ear: Tympanic membrane normal.      Nose: Nose normal.   Eyes:      Extraocular Movements: Extraocular movements intact.      Pupils: Pupils are equal, round, and reactive to light.   Cardiovascular:      Rate and Rhythm: Normal rate and regular rhythm.   Pulmonary:      Effort: Pulmonary effort is normal. No respiratory distress.      Breath sounds: Normal breath sounds.   Abdominal:      General: Abdomen is flat.      Tenderness: There is no abdominal tenderness.   Musculoskeletal:      Cervical back: Normal range of motion. No tenderness.      Comments: Mild TTP of R hip, full passive ROM of that leg, no C, T, or L spine TTP, no ecchymosis or bruising to flank or abdomen, has baseline weakness in R leg due to prior stroke   Skin:     General: Skin is warm.      Capillary Refill: Capillary refill takes less than 2 seconds.   Neurological:      Mental Status: He is alert. Mental status is at baseline.      Cranial Nerves: No cranial nerve deficit.   Psychiatric:         Mood and Affect: Mood normal.         ED Course & [facility]   ED Course as of 06/26/24 0818   Sun Jun 23, 2024   2213 Patient signed out to me pending x-ray, ambulatory trial.  Patient fell in the right hip no head strike.  Having muscle spasm on initial provider's assessment.  X-rays obtained and pending.  If negative plan for ambulatory trial and disposition. [JM]   2243 Spoke to family at bedside, collateral history obtained and patient reportedly fell directly onto his back and did strike his head.  Patient Nuys any headache or back pain currently.  Is unable to ambulate, has difficulty putting his right leg on the ground, has some sensory difficulty in the right leg due to prior stroke.  His muscle spasm has markedly improved. [JM]   2244  prior stroke.  His [JM]   Mon Jun 24, 2024   0126 Closed acetabular/pelvic fractures.  Ortho OZZY consulted. [JM]   0246  Orthopedics recommends transfer downtown for further evaluation, spoke to Dr. Bryant from [place]o at [facility], states that patient needs nonoperative treatment, nonweightbearing, does not need transfer to [facility] and can stay here at [facility]. [JM]      ED Course User Index  [JM] Adalid Castellanos MD         Diagnoses as of 06/26/24 0818   Closed nondisplaced fracture of pelvis, unspecified part of pelvis, initial encounter (Multi)   Closed displaced fracture of right acetabulum, unspecified portion of acetabulum, initial encounter (Multi)       Medical Decision Making  69yoM with mechanical fall. Will defer head CT as he has no headache, no nausea, is not on a blood thinner, no head strike. No indication for Cts of the C, T, L spine given no TTP. Will obtain x-rays of the hip and right leg to assess for fracture. Will provide pain control. Signed out with xrays pending.         Procedure  Procedures     Khurram Castañeda MD  06/26/24 0820
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
## 2024-06-24 PROBLEM — S32.413A: Status: ACTIVE | Noted: 2024-06-24

## 2024-06-24 LAB
ALBUMIN SERPL BCP-MCNC: 3.8 G/DL (ref 3.4–5)
ALP SERPL-CCNC: 48 U/L (ref 33–136)
ALT SERPL W P-5'-P-CCNC: 20 U/L (ref 10–52)
ANION GAP SERPL CALC-SCNC: 12 MMOL/L (ref 10–20)
AST SERPL W P-5'-P-CCNC: 21 U/L (ref 9–39)
BILIRUB SERPL-MCNC: 1.1 MG/DL (ref 0–1.2)
BUN SERPL-MCNC: 35 MG/DL (ref 6–23)
CALCIUM SERPL-MCNC: 9.2 MG/DL (ref 8.6–10.3)
CHLORIDE SERPL-SCNC: 105 MMOL/L (ref 98–107)
CO2 SERPL-SCNC: 24 MMOL/L (ref 21–32)
CREAT SERPL-MCNC: 1.24 MG/DL (ref 0.5–1.3)
EGFRCR SERPLBLD CKD-EPI 2021: 62 ML/MIN/1.73M*2
GLUCOSE SERPL-MCNC: 108 MG/DL (ref 74–99)
POTASSIUM SERPL-SCNC: 4.5 MMOL/L (ref 3.5–5.3)
PROT SERPL-MCNC: 6.4 G/DL (ref 6.4–8.2)
SODIUM SERPL-SCNC: 136 MMOL/L (ref 136–145)

## 2024-06-24 PROCEDURE — 2500000001 HC RX 250 WO HCPCS SELF ADMINISTERED DRUGS (ALT 637 FOR MEDICARE OP): Performed by: NURSE PRACTITIONER

## 2024-06-24 PROCEDURE — 2500000001 HC RX 250 WO HCPCS SELF ADMINISTERED DRUGS (ALT 637 FOR MEDICARE OP): Performed by: FAMILY MEDICINE

## 2024-06-24 PROCEDURE — 2500000004 HC RX 250 GENERAL PHARMACY W/ HCPCS (ALT 636 FOR OP/ED): Performed by: EMERGENCY MEDICINE

## 2024-06-24 PROCEDURE — 1100000001 HC PRIVATE ROOM DAILY

## 2024-06-24 PROCEDURE — 2500000004 HC RX 250 GENERAL PHARMACY W/ HCPCS (ALT 636 FOR OP/ED): Performed by: FAMILY MEDICINE

## 2024-06-24 PROCEDURE — 96374 THER/PROPH/DIAG INJ IV PUSH: CPT | Mod: 59

## 2024-06-24 RX ORDER — MV-MIN/FOLIC/K1/LYCOPEN/LUTEIN 300-60 MCG
1 TABLET ORAL DAILY
COMMUNITY

## 2024-06-24 RX ORDER — OXYCODONE HYDROCHLORIDE 5 MG/1
5 TABLET ORAL EVERY 6 HOURS PRN
Status: DISCONTINUED | OUTPATIENT
Start: 2024-06-24 | End: 2024-06-26 | Stop reason: HOSPADM

## 2024-06-24 RX ORDER — ACETAMINOPHEN 650 MG/1
650 SUPPOSITORY RECTAL EVERY 4 HOURS PRN
Status: DISCONTINUED | OUTPATIENT
Start: 2024-06-24 | End: 2024-06-24

## 2024-06-24 RX ORDER — ACETAMINOPHEN 160 MG/5ML
650 SOLUTION ORAL EVERY 4 HOURS PRN
Status: DISCONTINUED | OUTPATIENT
Start: 2024-06-24 | End: 2024-06-24

## 2024-06-24 RX ORDER — ACETAMINOPHEN 325 MG/1
650 TABLET ORAL EVERY 6 HOURS
Status: DISCONTINUED | OUTPATIENT
Start: 2024-06-24 | End: 2024-06-26 | Stop reason: HOSPADM

## 2024-06-24 RX ORDER — HYDROMORPHONE HYDROCHLORIDE 0.2 MG/ML
0.2 INJECTION INTRAMUSCULAR; INTRAVENOUS; SUBCUTANEOUS
Status: DISCONTINUED | OUTPATIENT
Start: 2024-06-24 | End: 2024-06-26

## 2024-06-24 RX ORDER — FAMOTIDINE 20 MG/1
20 TABLET, FILM COATED ORAL 2 TIMES DAILY
Status: DISCONTINUED | OUTPATIENT
Start: 2024-06-24 | End: 2024-06-26 | Stop reason: HOSPADM

## 2024-06-24 RX ORDER — TALC
3 POWDER (GRAM) TOPICAL NIGHTLY PRN
Status: DISCONTINUED | OUTPATIENT
Start: 2024-06-24 | End: 2024-06-26 | Stop reason: HOSPADM

## 2024-06-24 RX ORDER — LOSARTAN POTASSIUM 50 MG/1
50 TABLET ORAL DAILY
Status: DISCONTINUED | OUTPATIENT
Start: 2024-06-25 | End: 2024-06-26 | Stop reason: HOSPADM

## 2024-06-24 RX ORDER — POLYETHYLENE GLYCOL 3350 17 G/17G
17 POWDER, FOR SOLUTION ORAL DAILY
Status: DISCONTINUED | OUTPATIENT
Start: 2024-06-24 | End: 2024-06-26 | Stop reason: HOSPADM

## 2024-06-24 RX ORDER — ONDANSETRON 4 MG/1
4 TABLET, FILM COATED ORAL EVERY 8 HOURS PRN
Status: DISCONTINUED | OUTPATIENT
Start: 2024-06-24 | End: 2024-06-26 | Stop reason: HOSPADM

## 2024-06-24 RX ORDER — CITALOPRAM 20 MG/1
20 TABLET, FILM COATED ORAL DAILY
Status: DISCONTINUED | OUTPATIENT
Start: 2024-06-24 | End: 2024-06-26 | Stop reason: HOSPADM

## 2024-06-24 RX ORDER — GUAIFENESIN 600 MG/1
600 TABLET, EXTENDED RELEASE ORAL EVERY 12 HOURS PRN
Status: DISCONTINUED | OUTPATIENT
Start: 2024-06-24 | End: 2024-06-26 | Stop reason: HOSPADM

## 2024-06-24 RX ORDER — SODIUM CHLORIDE, SODIUM LACTATE, POTASSIUM CHLORIDE, CALCIUM CHLORIDE 600; 310; 30; 20 MG/100ML; MG/100ML; MG/100ML; MG/100ML
100 INJECTION, SOLUTION INTRAVENOUS CONTINUOUS
Status: ACTIVE | OUTPATIENT
Start: 2024-06-24 | End: 2024-06-25

## 2024-06-24 RX ORDER — CHOLECALCIFEROL (VITAMIN D3) 25 MCG
1000 TABLET ORAL DAILY
Status: DISCONTINUED | OUTPATIENT
Start: 2024-06-24 | End: 2024-06-26 | Stop reason: HOSPADM

## 2024-06-24 RX ORDER — ACETAMINOPHEN 325 MG/1
650 TABLET ORAL EVERY 4 HOURS PRN
Status: DISCONTINUED | OUTPATIENT
Start: 2024-06-24 | End: 2024-06-24

## 2024-06-24 RX ORDER — ONDANSETRON HYDROCHLORIDE 2 MG/ML
4 INJECTION, SOLUTION INTRAVENOUS EVERY 8 HOURS PRN
Status: DISCONTINUED | OUTPATIENT
Start: 2024-06-24 | End: 2024-06-26 | Stop reason: HOSPADM

## 2024-06-24 RX ORDER — METHOCARBAMOL 500 MG/1
500 TABLET, FILM COATED ORAL EVERY 8 HOURS PRN
Status: DISCONTINUED | OUTPATIENT
Start: 2024-06-24 | End: 2024-06-26 | Stop reason: ALTCHOICE

## 2024-06-24 RX ORDER — CARVEDILOL 3.12 MG/1
3.12 TABLET ORAL
Status: DISCONTINUED | OUTPATIENT
Start: 2024-06-24 | End: 2024-06-26 | Stop reason: HOSPADM

## 2024-06-24 RX ORDER — FAMOTIDINE 10 MG/ML
20 INJECTION INTRAVENOUS 2 TIMES DAILY
Status: DISCONTINUED | OUTPATIENT
Start: 2024-06-24 | End: 2024-06-26 | Stop reason: HOSPADM

## 2024-06-24 RX ORDER — AMLODIPINE BESYLATE 5 MG/1
5 TABLET ORAL DAILY
Status: DISCONTINUED | OUTPATIENT
Start: 2024-06-24 | End: 2024-06-26 | Stop reason: HOSPADM

## 2024-06-24 RX ORDER — NAPROXEN SODIUM 220 MG/1
81 TABLET, FILM COATED ORAL DAILY
Status: DISCONTINUED | OUTPATIENT
Start: 2024-06-24 | End: 2024-06-26 | Stop reason: HOSPADM

## 2024-06-24 RX ORDER — ATORVASTATIN CALCIUM 40 MG/1
40 TABLET, FILM COATED ORAL DAILY
Status: DISCONTINUED | OUTPATIENT
Start: 2024-06-24 | End: 2024-06-26 | Stop reason: HOSPADM

## 2024-06-24 SDOH — SOCIAL STABILITY: SOCIAL INSECURITY: ARE YOU OR HAVE YOU BEEN THREATENED OR ABUSED PHYSICALLY, EMOTIONALLY, OR SEXUALLY BY ANYONE?: NO

## 2024-06-24 SDOH — SOCIAL STABILITY: SOCIAL INSECURITY: DO YOU FEEL UNSAFE GOING BACK TO THE PLACE WHERE YOU ARE LIVING?: NO

## 2024-06-24 SDOH — SOCIAL STABILITY: SOCIAL INSECURITY: DO YOU FEEL ANYONE HAS EXPLOITED OR TAKEN ADVANTAGE OF YOU FINANCIALLY OR OF YOUR PERSONAL PROPERTY?: NO

## 2024-06-24 SDOH — SOCIAL STABILITY: SOCIAL INSECURITY: HAS ANYONE EVER THREATENED TO HURT YOUR FAMILY OR YOUR PETS?: NO

## 2024-06-24 SDOH — SOCIAL STABILITY: SOCIAL INSECURITY: ARE THERE ANY APPARENT SIGNS OF INJURIES/BEHAVIORS THAT COULD BE RELATED TO ABUSE/NEGLECT?: NO

## 2024-06-24 SDOH — SOCIAL STABILITY: SOCIAL INSECURITY: ABUSE: ADULT

## 2024-06-24 SDOH — SOCIAL STABILITY: SOCIAL INSECURITY: WERE YOU ABLE TO COMPLETE ALL THE BEHAVIORAL HEALTH SCREENINGS?: YES

## 2024-06-24 SDOH — SOCIAL STABILITY: SOCIAL INSECURITY: HAVE YOU HAD THOUGHTS OF HARMING ANYONE ELSE?: NO

## 2024-06-24 SDOH — SOCIAL STABILITY: SOCIAL INSECURITY: DOES ANYONE TRY TO KEEP YOU FROM HAVING/CONTACTING OTHER FRIENDS OR DOING THINGS OUTSIDE YOUR HOME?: NO

## 2024-06-24 SDOH — SOCIAL STABILITY: SOCIAL INSECURITY: HAVE YOU HAD ANY THOUGHTS OF HARMING ANYONE ELSE?: NO

## 2024-06-24 ASSESSMENT — COGNITIVE AND FUNCTIONAL STATUS - GENERAL
TURNING FROM BACK TO SIDE WHILE IN FLAT BAD: A LOT
MOVING FROM LYING ON BACK TO SITTING ON SIDE OF FLAT BED WITH BEDRAILS: A LITTLE
CLIMB 3 TO 5 STEPS WITH RAILING: A LITTLE
DRESSING REGULAR UPPER BODY CLOTHING: A LOT
CLIMB 3 TO 5 STEPS WITH RAILING: A LOT
MOBILITY SCORE: 23
HELP NEEDED FOR BATHING: A LITTLE
PATIENT BASELINE BEDBOUND: NO
PERSONAL GROOMING: A LOT
EATING MEALS: A LITTLE
TOILETING: A LITTLE
TOILETING: A LOT
STANDING UP FROM CHAIR USING ARMS: A LOT
DRESSING REGULAR LOWER BODY CLOTHING: A LOT
DRESSING REGULAR UPPER BODY CLOTHING: A LITTLE
HELP NEEDED FOR BATHING: A LOT
DAILY ACTIVITIY SCORE: 13
DRESSING REGULAR LOWER BODY CLOTHING: A LITTLE
MOVING TO AND FROM BED TO CHAIR: A LOT
MOBILITY SCORE: 13
WALKING IN HOSPITAL ROOM: A LOT
DAILY ACTIVITIY SCORE: 20

## 2024-06-24 ASSESSMENT — LIFESTYLE VARIABLES
HOW OFTEN DO YOU HAVE A DRINK CONTAINING ALCOHOL: 2-4 TIMES A MONTH
AUDIT-C TOTAL SCORE: 2
SUBSTANCE_ABUSE_PAST_12_MONTHS: NO
HOW OFTEN DO YOU HAVE 6 OR MORE DRINKS ON ONE OCCASION: NEVER
SKIP TO QUESTIONS 9-10: 1
AUDIT-C TOTAL SCORE: 2
HOW MANY STANDARD DRINKS CONTAINING ALCOHOL DO YOU HAVE ON A TYPICAL DAY: 1 OR 2
PRESCIPTION_ABUSE_PAST_12_MONTHS: NO

## 2024-06-24 ASSESSMENT — ACTIVITIES OF DAILY LIVING (ADL)
LACK_OF_TRANSPORTATION: NO
HEARING - RIGHT EAR: FUNCTIONAL
HEARING - LEFT EAR: FUNCTIONAL
BATHING: INDEPENDENT
PATIENT'S MEMORY ADEQUATE TO SAFELY COMPLETE DAILY ACTIVITIES?: YES
DRESSING YOURSELF: INDEPENDENT
ADEQUATE_TO_COMPLETE_ADL: YES
TOILETING: INDEPENDENT
WALKS IN HOME: INDEPENDENT
JUDGMENT_ADEQUATE_SAFELY_COMPLETE_DAILY_ACTIVITIES: YES
GROOMING: INDEPENDENT
FEEDING YOURSELF: INDEPENDENT

## 2024-06-24 ASSESSMENT — PAIN SCALES - GENERAL
PAINLEVEL_OUTOF10: 3
PAINLEVEL_OUTOF10: 7

## 2024-06-24 ASSESSMENT — PATIENT HEALTH QUESTIONNAIRE - PHQ9
2. FEELING DOWN, DEPRESSED OR HOPELESS: NOT AT ALL
SUM OF ALL RESPONSES TO PHQ9 QUESTIONS 1 & 2: 0
1. LITTLE INTEREST OR PLEASURE IN DOING THINGS: NOT AT ALL

## 2024-06-24 ASSESSMENT — PAIN DESCRIPTION - ORIENTATION: ORIENTATION: RIGHT

## 2024-06-24 ASSESSMENT — PAIN - FUNCTIONAL ASSESSMENT: PAIN_FUNCTIONAL_ASSESSMENT: 0-10

## 2024-06-24 ASSESSMENT — PAIN DESCRIPTION - LOCATION: LOCATION: HIP

## 2024-06-24 NOTE — H&P
History Of Present Illness  Nasir Camacho is a 69 y.o. male presenting with fall while trying to change a light bulb  Does have rt sided acetabular fractrure and does have pain and difficulty walking, he was seen by ortho and recommended pain control ttwb and discharge planning   Pt does have pain and difficulty in moving'  H/o tavr in past, cva, ICH     Past Medical History  He has a past medical history of Personal history of other diseases of the circulatory system (05/17/2021) and Personal history of other endocrine, nutritional and metabolic disease.    Surgical History  He has a past surgical history that includes Coronary angioplasty with stent (10/06/2014); Ankle surgery (10/06/2014); Femur fracture surgery (11/06/2014); CT angio head w and wo IV contrast (7/18/2021); and CT angio neck (7/18/2021).     Social History  He reports that he has never smoked. He has never used smokeless tobacco. He reports current alcohol use. He reports that he does not use drugs.    Family History  No family history on file.     Allergies  Penicillin    Review of Systems   No chest pain   No shortness of breath   No cough   No change in urine   No nausea vomiting     Physical Exam     Does appear in pain   Able to move rt lower ext  Heent nomral muoca   Neck supple no jvd  Cvs regular  Resp good air entry no rales  Abdo soft nontender bs active, no masses  Cns aox 3    Last Recorded Vitals  /56   Pulse 61   Temp 36.8 °C (98.2 °F) (Temporal)   Resp 18   Wt 74.8 kg (165 lb)   SpO2 98%     Relevant Results  Scheduled medications  amLODIPine, 5 mg, oral, Daily  aspirin, 81 mg, oral, Daily  atorvastatin, 40 mg, oral, Daily  carvedilol, 3.125 mg, oral, BID  cholecalciferol, 1,000 Units, oral, Daily  citalopram, 20 mg, oral, Daily  famotidine, 20 mg, oral, BID   Or  famotidine, 20 mg, intravenous, BID  [START ON 6/25/2024] losartan, 50 mg, oral, Daily  polyethylene glycol, 17 g, oral, Daily      Continuous  medications  lactated Ringer's, 100 mL/hr      PRN medications  PRN medications: acetaminophen **OR** acetaminophen **OR** acetaminophen, acetaminophen **OR** acetaminophen **OR** acetaminophen, guaiFENesin, HYDROmorphone, melatonin, ondansetron **OR** ondansetron, oxyCODONE  Results for orders placed or performed during the hospital encounter of 06/23/24 (from the past 96 hour(s))   CBC and Auto Differential   Result Value Ref Range    WBC 9.7 4.4 - 11.3 x10*3/uL    nRBC 0.0 0.0 - 0.0 /100 WBCs    RBC 3.52 (L) 4.50 - 5.90 x10*6/uL    Hemoglobin 11.4 (L) 13.5 - 17.5 g/dL    Hematocrit 34.9 (L) 41.0 - 52.0 %    MCV 99 80 - 100 fL    MCH 32.4 26.0 - 34.0 pg    MCHC 32.7 32.0 - 36.0 g/dL    RDW 13.2 11.5 - 14.5 %    Platelets 127 (L) 150 - 450 x10*3/uL    Neutrophils % 74.7 40.0 - 80.0 %    Immature Granulocytes %, Automated 0.3 0.0 - 0.9 %    Lymphocytes % 16.7 13.0 - 44.0 %    Monocytes % 7.0 2.0 - 10.0 %    Eosinophils % 1.0 0.0 - 6.0 %    Basophils % 0.3 0.0 - 2.0 %    Neutrophils Absolute 7.26 (H) 1.60 - 5.50 x10*3/uL    Immature Granulocytes Absolute, Automated 0.03 0.00 - 0.50 x10*3/uL    Lymphocytes Absolute 1.62 0.80 - 3.00 x10*3/uL    Monocytes Absolute 0.68 0.05 - 0.80 x10*3/uL    Eosinophils Absolute 0.10 0.00 - 0.40 x10*3/uL    Basophils Absolute 0.03 0.00 - 0.10 x10*3/uL   Comprehensive metabolic panel   Result Value Ref Range    Glucose 108 (H) 74 - 99 mg/dL    Sodium 136 136 - 145 mmol/L    Potassium 4.5 3.5 - 5.3 mmol/L    Chloride 105 98 - 107 mmol/L    Bicarbonate 24 21 - 32 mmol/L    Anion Gap 12 10 - 20 mmol/L    Urea Nitrogen 35 (H) 6 - 23 mg/dL    Creatinine 1.24 0.50 - 1.30 mg/dL    eGFR 62 >60 mL/min/1.73m*2    Calcium 9.2 8.6 - 10.3 mg/dL    Albumin 3.8 3.4 - 5.0 g/dL    Alkaline Phosphatase 48 33 - 136 U/L    Total Protein 6.4 6.4 - 8.2 g/dL    AST 21 9 - 39 U/L    Bilirubin, Total 1.1 0.0 - 1.2 mg/dL    ALT 20 10 - 52 U/L   Protime-INR   Result Value Ref Range    Protime 12.2 9.8 - 12.8  seconds    INR 1.1 0.9 - 1.1            Assessment/Plan   Principal Problem:    Fall, initial encounter  Active Problems:    S/P TAVR (transcatheter aortic valve replacement)    Pulmonary nodule    Essential hypertension    Depression    Fracture of anterior wall of acetabulum (Multi)  WILFRID    Euvolemic  Start on fluids  Pain control   Scds  Dvt ppx  Ot and pt  Check labs in am  Per ortho ttwb  No plans for surgeyr   Seen dw family and nursing         Cuco Angela MD

## 2024-06-24 NOTE — PROGRESS NOTES
Pharmacy Medication History Review    Per patient and wife bedside.     Nasir Camacho is a 69 y.o. male admitted for Fall, initial encounter. Pharmacy reviewed the patient's ehvdg-sp-knyhnqehd medications and allergies for accuracy.    The list below reflectives the updated PTA list. Please review each medication in order reconciliation for additional clarification and justification.       The list below reflectives the updated allergy list. Please review each documented allergy for additional clarification and justification.  Allergies  Reviewed by Mesfin Pineda RN on 6/23/2024        Severity Reactions Comments    Penicillin Not Specified Unknown             Below are additional concerns with the patient's PTA list.  Prior to Admission Medications   Prescriptions Last Dose Informant   amLODIPine (Norvasc) 5 mg tablet 6/23/2024 at am    Sig: TAKE 1 TABLET BY MOUTH EVERY DAY   aspirin 81 mg chewable tablet 6/23/2024 at am    Sig: Chew 1 tablet (81 mg) once daily.   atorvastatin (Lipitor) 40 mg tablet 6/22/2024    Sig: Take 1 tablet (40 mg) by mouth once daily.   carvedilol (Coreg) 3.125 mg tablet 6/23/2024 at am    Sig: TAKE 1 TABLET BY MOUTH TWICE A DAY WITH MEALS   cholecalciferol (Vitamin D-3) 25 MCG (1000 UT) tablet 6/23/2024    Sig: Take 1 tablet (1,000 Units) by mouth once daily.   citalopram (CeleXA) 20 mg tablet 6/23/2024 at am    Sig: TAKE 1 TABLET BY MOUTH EVERY DAY   losartan (Cozaar) 50 mg tablet 6/22/2024    Sig: Take 1 tablet (50 mg) by mouth once daily.   mv-min-folic-K1-lycopen-lutein (Centrum Silver Men) 404--300 mcg tablet 6/23/2024    Sig: Take 1 tablet by mouth once daily.      Facility-Administered Medications: None        Jossy Woody

## 2024-06-24 NOTE — PROGRESS NOTES
Transfer of care note:    I received this patient in signout -   ED Course as of 06/25/24 1826   Sun Jun 23, 2024   2213 Patient signed out to me pending x-ray, ambulatory trial.  Patient fell in the right hip no head strike.  Having muscle spasm on initial provider's assessment.  X-rays obtained and pending.  If negative plan for ambulatory trial and disposition. [JM]   2243 Spoke to family at bedside, collateral history obtained and patient reportedly fell directly onto his back and did strike his head.  Patient Nuys any headache or back pain currently.  Is unable to ambulate, has difficulty putting his right leg on the ground, has some sensory difficulty in the right leg due to prior stroke.  His muscle spasm has markedly improved. [JM]   2244  prior stroke.  His [JM]   Mon Jun 24, 2024   0126 Closed acetabular/pelvic fractures.  Ortho OZZY consulted. [JM]   0246 Orthopedics recommends transfer downtown for further evaluation, spoke to Dr. Bryant from Ortho at [facility], states that patient needs nonoperative treatment, nonweightbearing, does not need transfer to [facility] and can stay here at [facility]. [JM]      ED Course User Index  [JM] Adalid Castellanos MD         Diagnoses as of 06/25/24 1826   Closed nondisplaced fracture of pelvis, unspecified part of pelvis, initial encounter (Multi)   Closed displaced fracture of right acetabulum, unspecified portion of acetabulum, initial encounter (Multi)     Ortho OZZY updated regarding patient staying here at [facility].  Patient care signed out to admitting team for continued management stable condition.  Patient updated as well.  Pain controlled in the ED with 0.5 mg IV Dilaudid.  Remains neurovascular intact.

## 2024-06-24 NOTE — PROGRESS NOTES
Home with wife   NEW Acute rehab vs SNF     06/24/24 0720   Current Planned Discharge Disposition   Current Planned Discharge Disposition SNF

## 2024-06-24 NOTE — TREATMENT PLAN
Nasir Camacho is a 70 yo male s/p fall with acute comminuted fractures of the anterior, medial and posterior wall and roof of the right acetabulum with displacement of fractures and also acute fracture of the right superior pubic ramus proximally and the right inferior pubic ramus distally seen on CT scan. On exam, patient is visibly uncomfortable and having what appears to be muscle spasm of the left hip.     Plan:   - no need for transfer to [facility] as there is no orthopedic surgical intervention needed at this time,   - scheduled tylenol- added  - robaxin added for muscle spasm  - oxycodone as needed for severe pain   - toe touch weight bearing   - PT/OT to eval for home vs rehab    Dispo: Pt to follow up with Dr Bryant as an outpatient. Ok to be discharged when medically ready from orthopedic perspective    AMBAR Mcarthur-CNP

## 2024-06-24 NOTE — PROGRESS NOTES
7000 Physician Certification      As the individual's attending physician , I certify that the above-named patient:  Is being discharged to a nursing facility directly from a hospital after receiving acute patient care at the hospital, and  Requires nursing facility services for the condition for which he/she received care in the hospital, and  Requires fewer than 30 days of nursing facility services, no later than the date of discharge.    I certify that inpatient care is required at the level recommended above. To the best of my knowledge, all information provided about the individual is a true and accurate reflection of the individual's condition.

## 2024-06-24 NOTE — CONSULTS
Reason For Consult  Acetabular/pelvic fractures    History Of Present Illness  Nasir Camacho is a 69 y.o. male who presented to [facility] ED s/p fall from a desk. Patient was standing on a desk changing a light bulb and fell onto his right side. Denies head strike, no LOC. Plain films were obtained that were negative for any traumatic injury. Patient was unable to ambulate and CT scans were obtained s/f acute comminuted fractures of the anterior, medial and posterior wall and roof of the right acetabulum with displacement of fractures and also acute fracture of the right superior pubic ramus proximally and the right inferior pubic ramus distally. Patient denies any SOB, CP, fevers, chills. Having some increased pain to the R leg. Denies any blood thinners, on ASA 81 daily. CT head neg.      Past Medical History  He has a past medical history of Personal history of other diseases of the circulatory system (05/17/2021) and Personal history of other endocrine, nutritional and metabolic disease.    Surgical History  He has a past surgical history that includes Coronary angioplasty with stent (10/06/2014); Ankle surgery (10/06/2014); Femur fracture surgery (11/06/2014); CT angio head w and wo IV contrast (7/18/2021); and CT angio neck (7/18/2021).     Social History  He reports that he has never smoked. He has never used smokeless tobacco. He reports current alcohol use. He reports that he does not use drugs.    Family History  No family history on file.     Allergies  Penicillin    Review of Systems  12 point ROS negative unless stated above     Physical Exam  PE:  Constitutional: A&Ox3, calm and cooperative, NAD  Eyes: EOMI, clear sclera   Cardiovascular: Normal rate and regular rhythm. No murmurs  Respiratory/Thorax: CTAB, on RA  Gastrointestinal:   Genitourinary: Voiding independently   Musculoskeletal: ROM intact, no joint swelling, normal strength  Extremities: No peripheral edema, contusions, or wounds  Neurological:  "A&Ox3, No focal deficits   Psychological: Appropriate mood and behavior      Last Recorded Vitals  Blood pressure 139/68, pulse 55, temperature 36.8 °C (98.2 °F), temperature source Temporal, resp. rate 16, height 1.727 m (5' 8\"), weight 74.8 kg (165 lb), SpO2 95%.    Relevant Results  Results for orders placed or performed during the hospital encounter of 06/23/24 (from the past 24 hour(s))   CBC and Auto Differential   Result Value Ref Range    WBC 9.7 4.4 - 11.3 x10*3/uL    nRBC 0.0 0.0 - 0.0 /100 WBCs    RBC 3.52 (L) 4.50 - 5.90 x10*6/uL    Hemoglobin 11.4 (L) 13.5 - 17.5 g/dL    Hematocrit 34.9 (L) 41.0 - 52.0 %    MCV 99 80 - 100 fL    MCH 32.4 26.0 - 34.0 pg    MCHC 32.7 32.0 - 36.0 g/dL    RDW 13.2 11.5 - 14.5 %    Platelets 127 (L) 150 - 450 x10*3/uL    Neutrophils % 74.7 40.0 - 80.0 %    Immature Granulocytes %, Automated 0.3 0.0 - 0.9 %    Lymphocytes % 16.7 13.0 - 44.0 %    Monocytes % 7.0 2.0 - 10.0 %    Eosinophils % 1.0 0.0 - 6.0 %    Basophils % 0.3 0.0 - 2.0 %    Neutrophils Absolute 7.26 (H) 1.60 - 5.50 x10*3/uL    Immature Granulocytes Absolute, Automated 0.03 0.00 - 0.50 x10*3/uL    Lymphocytes Absolute 1.62 0.80 - 3.00 x10*3/uL    Monocytes Absolute 0.68 0.05 - 0.80 x10*3/uL    Eosinophils Absolute 0.10 0.00 - 0.40 x10*3/uL    Basophils Absolute 0.03 0.00 - 0.10 x10*3/uL   Comprehensive metabolic panel   Result Value Ref Range    Glucose 108 (H) 74 - 99 mg/dL    Sodium 136 136 - 145 mmol/L    Potassium 4.5 3.5 - 5.3 mmol/L    Chloride 105 98 - 107 mmol/L    Bicarbonate 24 21 - 32 mmol/L    Anion Gap 12 10 - 20 mmol/L    Urea Nitrogen 35 (H) 6 - 23 mg/dL    Creatinine 1.24 0.50 - 1.30 mg/dL    eGFR 62 >60 mL/min/1.73m*2    Calcium 9.2 8.6 - 10.3 mg/dL    Albumin 3.8 3.4 - 5.0 g/dL    Alkaline Phosphatase 48 33 - 136 U/L    Total Protein 6.4 6.4 - 8.2 g/dL    AST 21 9 - 39 U/L    Bilirubin, Total 1.1 0.0 - 1.2 mg/dL    ALT 20 10 - 52 U/L   Protime-INR   Result Value Ref Range    Protime 12.2 9.8 - " 12.8 seconds    INR 1.1 0.9 - 1.1     CT pelvis wo IV contrast    Result Date: 6/24/2024  Interpreted By:  Kevin Kimble, STUDY: CT PELVIS WO IV CONTRAST; CT FEMUR RIGHT WO IV CONTRAST;  6/23/2024 11:30 pm   INDICATION: Signs/Symptoms:fall, R hip pain, inability to amb; Signs/Symptoms:Fall, right hip pain, inability to ambulate, nonweightbearing.   COMPARISON: None.   ACCESSION NUMBER(S): [id number]; [id number]   ORDERING CLINICIAN: JAMIE MONSALVE   TECHNIQUE: Contiguous axial images of the pelvis and the right femur were obtained without intravenous contrast. Coronal and sagittal reformatted images were obtained of the axial images.   FINDINGS: CT PELVIS AND RIGHT FEMUR:   There are acute comminuted fractures of the right iliac bone with fractures involving the anterior, medial and posterior wall and also the roof of the right acetabulum with displacement of fracture fragments. There is also fracture of the proximal right superior pubic ramus and the left inferior pubic. There is acute hemorrhage in the left and posterior pelvis.   There is postsurgical change of internal fixation of right femur diaphysis fracture with intramedullary luda. There is heterotopic ossification at site of the femur fracture.       Acute comminuted fractures of the anterior, medial and posterior wall and roof of the right acetabulum with displacement of fractures and also acute fracture of the right superior pubic ramus proximally and the right inferior pubic ramus distally.   Acute hemorrhage in the right posterior pelvis   Postsurgical change of internal fixation of right femur diaphysis fracture.   MACRO: None   Signed by: Kevin Kimble 6/24/2024 12:51 AM Dictation workstation:   [id number]    CT femur right wo IV contrast    Result Date: 6/24/2024  Interpreted By:  Kevin Kimble, STUDY: CT PELVIS WO IV CONTRAST; CT FEMUR RIGHT WO IV CONTRAST;  6/23/2024 11:30 pm   INDICATION: Signs/Symptoms:fall, R hip pain, inability to amb;  Signs/Symptoms:Fall, right hip pain, inability to ambulate, nonweightbearing.   COMPARISON: None.   ACCESSION NUMBER(S): [id number]; [id number]   ORDERING CLINICIAN: JAMIE MONSALVE   TECHNIQUE: Contiguous axial images of the pelvis and the right femur were obtained without intravenous contrast. Coronal and sagittal reformatted images were obtained of the axial images.   FINDINGS: CT PELVIS AND RIGHT FEMUR:   There are acute comminuted fractures of the right iliac bone with fractures involving the anterior, medial and posterior wall and also the roof of the right acetabulum with displacement of fracture fragments. There is also fracture of the proximal right superior pubic ramus and the left inferior pubic. There is acute hemorrhage in the left and posterior pelvis.   There is postsurgical change of internal fixation of right femur diaphysis fracture with intramedullary luda. There is heterotopic ossification at site of the femur fracture.       Acute comminuted fractures of the anterior, medial and posterior wall and roof of the right acetabulum with displacement of fractures and also acute fracture of the right superior pubic ramus proximally and the right inferior pubic ramus distally.   Acute hemorrhage in the right posterior pelvis   Postsurgical change of internal fixation of right femur diaphysis fracture.   MACRO: None   Signed by: Kevin Kimble 6/24/2024 12:51 AM Dictation workstation:   [id number]    CT lumbar spine wo IV contrast    Result Date: 6/24/2024  Interpreted By:  Kevin Kimble, STUDY: CT LUMBAR SPINE WO IV CONTRAST;  6/23/2024 11:30 pm   INDICATION: Signs/Symptoms:Fall, inability ambulate, distracting injury, right hip pain.   COMPARISON: None.   ACCESSION NUMBER(S): [id number]   ORDERING CLINICIAN: JAMEI MONSALVE   TECHNIQUE: Contiguous axial images of the lumbar spine were obtained without intravenous contrast. Coronal and sagittal reformatted images were obtained from the axial  images.   FINDINGS: No acute fracture of the lumbar spine. The vertebral body heights are preserved. There is multilevel degenerative change of the lumbar spine. There is multilevel intervertebral disc space narrowing and degenerative disc disease. There is multilevel anterior osseous spurring. There is limited evaluation of the soft tissues of the spinal canal. There is mild multilevel degenerative spinal canal narrowing. There is multilevel degenerative facet arthropathy.   Limited evaluation of the partially imaged soft tissues of the abdomen and pelvis. Multiple small nonobstructive renal calculi. Partially imaged left renal cyst.   Pelvic osseous fractures and hemorrhage described on the dedicated CT pelvis examination.       No acute fracture of the lumbar spine.   Multilevel degenerative change of the lumbar spine.   MACRO: None   Signed by: Kevin Kimble 6/24/2024 12:29 AM Dictation workstation:   [id number]    CT cervical spine wo IV contrast    Result Date: 6/24/2024  Interpreted By:  Kevin Kimble, STUDY: CT CERVICAL SPINE WO IV CONTRAST;  6/23/2024 11:30 pm   INDICATION: Signs/Symptoms:Fall, possible distracting injury.   COMPARISON: None.   ACCESSION NUMBER(S): [id number]   ORDERING CLINICIAN: JAMIE MONSALVE   TECHNIQUE: Contiguous axial images of the cervical spine were obtained without intravenous contrast. Coronal and sagittal reformatted images were obtained from the axial images.   FINDINGS: No acute fracture of the cervical spine. There is multilevel degenerative change of the cervical spine. There is mild grade 1 retrolisthesis of C5 on C6 and grade 1 anterolisthesis of C7 on T1. There is multilevel intervertebral disc space narrowing and degenerative disc disease. There is limited evaluation of the soft tissues of the spinal canal. There is multilevel posterior osseous spurring and disc protrusion. There is multilevel degenerative facet and uncovertebral arthropathy. No significant  prevertebral soft tissue edema.       No evidence of acute fracture of the cervical spine.   Multilevel degenerative change of the cervical spine.   MACRO: None   Signed by: Kevin Kimble 6/24/2024 12:26 AM Dictation workstation:   [id number]    CT head wo IV contrast    Result Date: 6/24/2024  Interpreted By:  Kevin Kimble, STUDY: CT HEAD WO IV CONTRAST;  6/23/2024 11:30 pm   INDICATION: Signs/Symptoms:fall, head strike.   COMPARISON: 8/5/2021   ACCESSION NUMBER(S): [id number]   ORDERING CLINICIAN: JAMIE MONSALVE   TECHNIQUE: Contiguous axial images of the head were obtained without intravenous contrast.   FINDINGS: The examination is limited secondary to patient motion.   BRAIN PARENCHYMA:  There is cerebral atrophy and chronic periventricular white matter small vessel ischemic change. The gray white matter differentiation is preserved.  No mass effect or midline shift.   HEMORRHAGE:  No evidence of acute intracranial hemorrhage. VENTRICLES AND EXTRA-AXIAL SPACES:  The ventricles are within normal limits in size for brain volume.  No evidence of abnormal extraaxial fluid collection. EXTRACRANIAL SOFT TISSUES:  Within normal limits. PARANASAL SINUSES/MASTOIDS:  The visualized paranasal sinuses and mastoid air cells are clear and well pneumatized. CALVARIUM:  No evidence of depressed calvarial fracture.   OTHER FINDINGS:  None       No evidence of acute intracranial hemorrhage or depressed calvarial fracture.       MACRO: None   Signed by: Kevin Kimble 6/24/2024 12:24 AM Dictation workstation:   [id number]    XR hip right with pelvis when performed 2 or 3 views    Result Date: 6/23/2024  Interpreted By:  Jose Jason, STUDY: XR HIP RIGHT WITH PELVIS WHEN PERFORMED 2 OR 3 VIEWS; XR KNEE RIGHT 1-2 VIEWS; XR FEMUR RIGHT 2+ VIEWS; ;  6/23/2024 9:47 pm   INDICATION: Signs/Symptoms:fall, pain in R inferior pubic ramus and right hip; Signs/Symptoms:fall, TTP distal femur near patella; Signs/Symptoms:fall, TTP  mid-femur.   COMPARISON: None.   ACCESSION NUMBER(S): [id number]; [id number]; [id number]   ORDERING CLINICIAN: MEKA OCHOA   FINDINGS: The pelvic ring is intact without acute fracture or widening of the pubic symphysis or sacroiliac joints. There is no acute fracture of the left proximal humeral or left hip dislocation. There is no acute fracture of the right femur. There is a healed fracture of the mid femoral diaphysis with heterotopic ossification along the medial aspect of the diaphysis. There is an intramedullary retrograde femoral nail without evidence of perihardware lucency or acute perihardware fracture.   There is mild degenerative change of the right knee without effusion. No acute fracture about the right knee.       No acute osseous abnormality of the pelvis, right femur, or right knee.   Uncomplicated right intramedullary femoral nail.   MACRO: None.   Signed by: Jose Jason 6/23/2024 10:28 PM Dictation workstation:   [id number]    XR femur right 2+ views    Result Date: 6/23/2024  Interpreted By:  Jose Jason, STUDY: XR HIP RIGHT WITH PELVIS WHEN PERFORMED 2 OR 3 VIEWS; XR KNEE RIGHT 1-2 VIEWS; XR FEMUR RIGHT 2+ VIEWS; ;  6/23/2024 9:47 pm   INDICATION: Signs/Symptoms:fall, pain in R inferior pubic ramus and right hip; Signs/Symptoms:fall, TTP distal femur near patella; Signs/Symptoms:fall, TTP mid-femur.   COMPARISON: None.   ACCESSION NUMBER(S): [id number]; [id number]; [id number]   ORDERING CLINICIAN: MEKA OCHOA   FINDINGS: The pelvic ring is intact without acute fracture or widening of the pubic symphysis or sacroiliac joints. There is no acute fracture of the left proximal humeral or left hip dislocation. There is no acute fracture of the right femur. There is a healed fracture of the mid femoral diaphysis with heterotopic ossification along the medial aspect of the diaphysis. There is an intramedullary retrograde femoral nail without evidence of perihardware  lucency or acute perihardware fracture.   There is mild degenerative change of the right knee without effusion. No acute fracture about the right knee.       No acute osseous abnormality of the pelvis, right femur, or right knee.   Uncomplicated right intramedullary femoral nail.   MACRO: None.   Signed by: Jose Jason 6/23/2024 10:28 PM Dictation workstation:   [id number]    XR knee right 1-2 views    Result Date: 6/23/2024  Interpreted By:  Jose Jason, STUDY: XR HIP RIGHT WITH PELVIS WHEN PERFORMED 2 OR 3 VIEWS; XR KNEE RIGHT 1-2 VIEWS; XR FEMUR RIGHT 2+ VIEWS; ;  6/23/2024 9:47 pm   INDICATION: Signs/Symptoms:fall, pain in R inferior pubic ramus and right hip; Signs/Symptoms:fall, TTP distal femur near patella; Signs/Symptoms:fall, TTP mid-femur.   COMPARISON: None.   ACCESSION NUMBER(S): [id number]; [id number]; [id number]   ORDERING CLINICIAN: MEKA OCHOA   FINDINGS: The pelvic ring is intact without acute fracture or widening of the pubic symphysis or sacroiliac joints. There is no acute fracture of the left proximal humeral or left hip dislocation. There is no acute fracture of the right femur. There is a healed fracture of the mid femoral diaphysis with heterotopic ossification along the medial aspect of the diaphysis. There is an intramedullary retrograde femoral nail without evidence of perihardware lucency or acute perihardware fracture.   There is mild degenerative change of the right knee without effusion. No acute fracture about the right knee.       No acute osseous abnormality of the pelvis, right femur, or right knee.   Uncomplicated right intramedullary femoral nail.   MACRO: None.   Signed by: Jose Jason 6/23/2024 10:28 PM Dictation workstation:   [id number]    ECG 12 lead (Clinic Performed)    Result Date: 6/22/2024  Sinus bradycardia Otherwise normal ECG When compared with ECG of 05-AUG-2021 12:54, No significant change was found Confirmed by Carroll Lai ([id number]) on  6/22/2024 9:58:15 AM        Assessment/Plan     69 YOM s/p fall. Injuries include: acute comminuted fractures of the anterior, medial and posterior wall and roof of the right acetabulum with displacement of fractures and also acute fracture of the right superior pubic ramus proximally and the right inferior pubic ramus distally.     Plan:  - Transfer to [facility] for T type fracture  - [facility] for RLE  - PRN analgesic in ED while awaiting transfer  - NPO    Dispo: Transfer to [facility] for further care and surgical intervention.     Discussed with Dr Huizar and resident Bushra SANTIZO spent 45 minutes in the professional and overall care of this patient.      Neva Cedeño, APRN-CNP

## 2024-06-24 NOTE — PROGRESS NOTES
06/24/24 0721   ACS Disability Status   Are you deaf or do you have serious difficulty hearing? N   Are you blind or do you have serious difficulty seeing, even when wearing glasses? N   Because of a physical, mental, or emotional condition, do you have serious difficulty concentrating, remembering, or making decisions? (5 years old or older) N   Do you have serious difficulty walking or climbing stairs? Y   Do you have serious difficulty dressing or bathing? Y   Because of a physical, mental, or emotional condition, do you have serious difficulty doing errands alone such as visiting the doctor? Y        06/24/24 0721   [facility] Disability Status   Are you deaf or do you have serious difficulty hearing? N   Are you blind or do you have serious difficulty seeing, even when wearing glasses? N   Because of a physical, mental, or emotional condition, do you have serious difficulty concentrating, remembering, or making decisions? (5 years old or older) N   Do you have serious difficulty walking or climbing stairs? Y   Do you have serious difficulty dressing or bathing? Y   Because of a physical, mental, or emotional condition, do you have serious difficulty doing errands alone such as visiting the doctor? Y

## 2024-06-24 NOTE — PROGRESS NOTES
Transitional Care Coordination Progress Note:  Plan per Medical/Surgical team: treatment of fall with fracture, PT?OT evals pending   Status: Inpatient   Payor source: medicare A/B, [employer] [place]  Discharge disposition: Home with wife   NEW Acute rehab vs SNF  Anticipate PT rec MOD. Discussed purpose & benefits of SNF. Patient provided choice list for new snf.  Potential Barriers: NON operative ?, NON weight bearing  ADOD: 6/27/2024   RAINA Toure RN, BSN Transitional Care Coordinator ED# [phone]      06/24/24 0757   Discharge Planning   Living Arrangements Spouse/significant other   Support Systems Spouse/significant other   Assistance Needed ortho surgery, PT/OT evals pending   Type of Residence Private residence   Number of Stairs to Enter Residence 2   Number of Stairs Within Residence 12   Home or Post Acute Services Post acute facilities (Rehab/SNF/etc)   Patient expects to be discharged to: Home with wife   NEW Acute rehab vs SNF   Does the patient need discharge transport arranged? Yes   RoundTrip coordination needed? Yes   Has discharge transport been arranged? No   Financial Resource Strain   How hard is it for you to pay for the very basics like food, housing, medical care, and heating? Not hard   Housing Stability   In the last 12 months, was there a time when you were not able to pay the mortgage or rent on time? N   In the last 12 months, how many places have you lived? 1   In the last 12 months, was there a time when you did not have a steady place to sleep or slept in a shelter (including now)? N   Transportation Needs   In the past 12 months, has lack of transportation kept you from medical appointments or from getting medications? no   In the past 12 months, has lack of transportation kept you from meetings, work, or from getting things needed for daily living? No

## 2024-06-25 LAB
ANION GAP SERPL CALC-SCNC: 10 MMOL/L (ref 10–20)
BUN SERPL-MCNC: 20 MG/DL (ref 6–23)
CALCIUM SERPL-MCNC: 8.8 MG/DL (ref 8.6–10.3)
CHLORIDE SERPL-SCNC: 105 MMOL/L (ref 98–107)
CO2 SERPL-SCNC: 27 MMOL/L (ref 21–32)
CREAT SERPL-MCNC: 1.02 MG/DL (ref 0.5–1.3)
EGFRCR SERPLBLD CKD-EPI 2021: 79 ML/MIN/1.73M*2
ERYTHROCYTE [DISTWIDTH] IN BLOOD BY AUTOMATED COUNT: 13.2 % (ref 11.5–14.5)
GLUCOSE SERPL-MCNC: 89 MG/DL (ref 74–99)
HCT VFR BLD AUTO: 32.1 % (ref 41–52)
HGB BLD-MCNC: 10.6 G/DL (ref 13.5–17.5)
MCH RBC QN AUTO: 33 PG (ref 26–34)
MCHC RBC AUTO-ENTMCNC: 33 G/DL (ref 32–36)
MCV RBC AUTO: 100 FL (ref 80–100)
NRBC BLD-RTO: 0 /100 WBCS (ref 0–0)
PLATELET # BLD AUTO: 105 X10*3/UL (ref 150–450)
POTASSIUM SERPL-SCNC: 4.1 MMOL/L (ref 3.5–5.3)
RBC # BLD AUTO: 3.21 X10*6/UL (ref 4.5–5.9)
SODIUM SERPL-SCNC: 138 MMOL/L (ref 136–145)
WBC # BLD AUTO: 7.3 X10*3/UL (ref 4.4–11.3)

## 2024-06-25 PROCEDURE — 85027 COMPLETE CBC AUTOMATED: CPT | Performed by: FAMILY MEDICINE

## 2024-06-25 PROCEDURE — 2500000004 HC RX 250 GENERAL PHARMACY W/ HCPCS (ALT 636 FOR OP/ED): Performed by: FAMILY MEDICINE

## 2024-06-25 PROCEDURE — 99232 SBSQ HOSP IP/OBS MODERATE 35: CPT | Performed by: INTERNAL MEDICINE

## 2024-06-25 PROCEDURE — 2500000001 HC RX 250 WO HCPCS SELF ADMINISTERED DRUGS (ALT 637 FOR MEDICARE OP): Performed by: FAMILY MEDICINE

## 2024-06-25 PROCEDURE — 36415 COLL VENOUS BLD VENIPUNCTURE: CPT | Performed by: FAMILY MEDICINE

## 2024-06-25 PROCEDURE — 97166 OT EVAL MOD COMPLEX 45 MIN: CPT | Mod: GO

## 2024-06-25 PROCEDURE — 80048 BASIC METABOLIC PNL TOTAL CA: CPT | Performed by: FAMILY MEDICINE

## 2024-06-25 PROCEDURE — 97161 PT EVAL LOW COMPLEX 20 MIN: CPT | Mod: GP

## 2024-06-25 PROCEDURE — 1100000001 HC PRIVATE ROOM DAILY

## 2024-06-25 PROCEDURE — 97530 THERAPEUTIC ACTIVITIES: CPT | Mod: GO

## 2024-06-25 PROCEDURE — 2500000001 HC RX 250 WO HCPCS SELF ADMINISTERED DRUGS (ALT 637 FOR MEDICARE OP): Performed by: NURSE PRACTITIONER

## 2024-06-25 RX ORDER — POLYETHYLENE GLYCOL 3350 17 G/17G
17 POWDER, FOR SOLUTION ORAL DAILY PRN
Start: 2024-06-25

## 2024-06-25 RX ORDER — METHOCARBAMOL 500 MG/1
500 TABLET, FILM COATED ORAL EVERY 8 HOURS PRN
Start: 2024-06-25 | End: 2024-06-26 | Stop reason: HOSPADM

## 2024-06-25 RX ORDER — ACETAMINOPHEN 325 MG/1
650 TABLET ORAL EVERY 6 HOURS PRN
Start: 2024-06-25

## 2024-06-25 ASSESSMENT — PAIN SCALES - GENERAL
PAINLEVEL_OUTOF10: 2
PAINLEVEL_OUTOF10: 3
PAINLEVEL_OUTOF10: 3
PAINLEVEL_OUTOF10: 2
PAINLEVEL_OUTOF10: 8
PAINLEVEL_OUTOF10: 2

## 2024-06-25 ASSESSMENT — COGNITIVE AND FUNCTIONAL STATUS - GENERAL
MOVING TO AND FROM BED TO CHAIR: A LOT
STANDING UP FROM CHAIR USING ARMS: A LOT
MOVING FROM LYING ON BACK TO SITTING ON SIDE OF FLAT BED WITH BEDRAILS: A LITTLE
DAILY ACTIVITIY SCORE: 16
HELP NEEDED FOR BATHING: A LOT
STANDING UP FROM CHAIR USING ARMS: A LOT
CLIMB 3 TO 5 STEPS WITH RAILING: TOTAL
MOBILITY SCORE: 14
WALKING IN HOSPITAL ROOM: A LITTLE
DAILY ACTIVITIY SCORE: 16
DRESSING REGULAR UPPER BODY CLOTHING: A LITTLE
TOILETING: A LOT
TURNING FROM BACK TO SIDE WHILE IN FLAT BAD: A LITTLE
TURNING FROM BACK TO SIDE WHILE IN FLAT BAD: A LITTLE
MOBILITY SCORE: 14
MOVING FROM LYING ON BACK TO SITTING ON SIDE OF FLAT BED WITH BEDRAILS: A LITTLE
CLIMB 3 TO 5 STEPS WITH RAILING: TOTAL
WALKING IN HOSPITAL ROOM: A LITTLE
DRESSING REGULAR LOWER BODY CLOTHING: TOTAL
MOVING TO AND FROM BED TO CHAIR: A LOT
HELP NEEDED FOR BATHING: A LOT
DRESSING REGULAR LOWER BODY CLOTHING: TOTAL
DRESSING REGULAR UPPER BODY CLOTHING: A LITTLE
TOILETING: A LOT

## 2024-06-25 ASSESSMENT — PAIN - FUNCTIONAL ASSESSMENT
PAIN_FUNCTIONAL_ASSESSMENT: 0-10

## 2024-06-25 ASSESSMENT — PAIN SCALES - PAIN ASSESSMENT IN ADVANCED DEMENTIA (PAINAD)
TOTALSCORE: RELAXATION TECHNIQUE
TOTALSCORE: MEDICATION (SEE MAR)

## 2024-06-25 ASSESSMENT — ACTIVITIES OF DAILY LIVING (ADL)
ADL_ASSISTANCE: INDEPENDENT
ADL_ASSISTANCE: INDEPENDENT

## 2024-06-25 NOTE — PROGRESS NOTES
Occupational Therapy    Evaluation/Treatment    Patient Name: Nasir Camacho  MRN: [id number]  : 1955  Today's Date: 24  Time Calculation  Start Time: 1040  Stop Time: 1107  Time Calculation (min): 27 min       Assessment:  OT Assessment: 68 yo presenting s/p fall with resultant R acetabular and pubi ramii fx; requires inc assist for all ADLs, transfers, mobility and experiencing inc pain and spasms. Anticipate MOD intensity OT at dc. Will follow per POC.  Prognosis: Good  Evaluation/Treatment Tolerance: Patient limited by pain  Medical Staff Made Aware: Yes  End of Session Patient Position: Up in chair, Alarm on (waffle cushion in chair)  OT Assessment Results: Decreased ADL status, Decreased endurance, Decreased functional mobility, Decreased gross motor control, Decreased trunk control for functional activities  Prognosis: Good  Evaluation/Treatment Tolerance: Patient limited by pain  Medical Staff Made Aware: Yes  Strengths: Ability to acquire knowledge, Attitude of self, Coping skills, Support of Caregivers, Premorbid level of function  Barriers to Participation: Comorbidities, Housing layout  Plan:  Treatment Interventions: ADL retraining, Functional transfer training, Endurance training, Cognitive reorientation, Patient/family training, Equipment evaluation/education, Neuromuscular reeducation, Compensatory technique education  OT Frequency: 3 times per week  OT Discharge Recommendations: Moderate intensity level of continued care  OT Recommended Transfer Status: Moderate assist, Assist of 1  OT - OK to Discharge: Yes  Treatment Interventions: ADL retraining, Functional transfer training, Endurance training, Cognitive reorientation, Patient/family training, Equipment evaluation/education, Neuromuscular reeducation, Compensatory technique education    Subjective   Current Problem:  1. Fall, initial encounter          General:   OT Received On: 24  General  Reason for Referral: 68 yo presenting  s/p fall from desk while changing a lightbulb with resultant injuries of R LE: acute comminuted fractures of the anterior, medial and posterior wall and roof of the right acetabulum with displacement of fractures and also acute fracture of the right superior pubic ramus proximally and the right inferior pubic ramus distally. Per ortho, non-op management and TTWB R LE  Past Medical History Relevant to Rehab:   Past Medical History:   Diagnosis Date    Personal history of other diseases of the circulatory system 05/17/2021    History of aortic valve stenosis    Personal history of other endocrine, nutritional and metabolic disease     History of Graves' disease     Prior to Session Communication: Bedside nurse  Patient Position Received: Bed, 3 rail up, Alarm on  Preferred Learning Style: verbal, visual  General Comment: supine on arrival, agreeable to OT, fearful of moving and pt R LE muscle spasms causing inc. pain  Precautions:  LE Weight Bearing Status: Right Toe-Touch Weight Bearing  Medical Precautions: Fall precautions  Vital Signs:     Pain:  Pain Assessment  Pain Assessment: 0-10  0-10 (Numeric) Pain Score: 2 (at rest-- increases when having muscle spasms)  Pain Type: Acute pain  Pain Location: Hip  Pain Orientation: Right    Objective   Cognition:  Overall Cognitive Status: Within Functional Limits  Attention: Within Functional Limits  Memory: Within Funtional Limits  Problem Solving: Within Functional Limits  Insight: Mild        Home Living:  Type of Home: House  Lives With: Spouse  Home Adaptive Equipment: None  Home Layout: Two level, 1/2 bath on main level, Bed/bath upstairs, Stairs to alternate level with rails  Alternate Level Stairs-Rails: Left  Alternate Level Stairs-Number of Steps: 13  Home Access: Stairs to enter with rails  Entrance Stairs-Number of Steps: 5  Bathroom Shower/Tub: Walk-in shower  Bathroom Toilet: Handicapped height  Bathroom Equipment: Grab bars in shower, Shower chair with  back  Prior Function:  Level of [place]: Independent with ADLs and functional transfers, Independent with homemaking with ambulation  ADL Assistance: Independent  Homemaking Assistance: Independent  Ambulatory Assistance: Independent  Prior Function Comments: ambulatory without AD at baseline; pt does report hx of  R LE impairment/compartment syndrome has hx of R LE spasms at baseline which are exacerbated by current injuries  IADL History:  Homemaking Responsibilities: Yes  IADL Comments: pt and spouse share IADL tasks; spouse able to assist prn  ADL:  Eating Assistance: Independent  Grooming Deficit: Setup (able to complete some UB grooming/hygiene with setup while in supported sitting)  UE Dressing Deficit: Setup  LE Dressing Assistance: Maximal  LE Dressing Deficit: Don/doff L sock, Don/doff R sock (max assist for LB ADLs at this time, severely limited due to pain and R LE spasms)    Activity Tolerance:  Endurance: Tolerates 10 - 20 min exercise with multiple rests  Functional Standing Tolerance:     Bed Mobility/Transfers: Bed Mobility  Bed Mobility: Yes  Bed Mobility 1  Bed Mobility 1: Supine to sitting  Level of Assistance 1: Maximum verbal cues, Maximum tactile cues, Moderate assistance  Bed Mobility Comments 1: mod assist to scoot hips forward to EOB and for trunk to transition forward to sitting-- pt holding R LE in knee and hip flexion due to pain/spasms    Transfers  Transfer: Yes  Transfer 1  Technique 1: Sit to stand  Transfer Device 1: Walker, Gait belt  Transfer Level of Assistance 1: Moderate assistance, Maximum verbal cues, Maximum tactile cues, Directs care (Comment)  Trials/Comments 1: max v/t cues for hand placement-- pt required one UE on walker; mod trunk support for forward propulsion and assist for balance when transitioning hand from bed to walker  Transfers 2  Technique 2: Stand to sit  Transfer Device 2: Gait belt, Walker  Transfer Level of Assistance 2: Maximum verbal cues, Maximum  tactile cues, Minimum assistance  Trials/Comments 2: good eccentric lowering with max cues and hand over hand for placement of UE's      Functional Mobility:  Functional Mobility  Functional Mobility Performed: Yes  Functional Mobility 1  Surface 1: Level tile  Device 1: Rolling walker  Functional Mobility Support Devices: Gait belt  Assistance 1: Maximum tactile cues, Maximum verbal cues, Moderate assistance  Comments 1: max v/t cues for sequencing with min assist to negotiate ww; cues to relax R LE-- holds taut in hip and knee flexion off floor; max education on TTWB R LE technique with decr. follow through  Sitting Balance:  Static Sitting Balance  Static Sitting-Level of Assistance: Close supervision  Dynamic Sitting Balance  Dynamic Sitting-Balance:  (cga)  Standing Balance:  Static Standing Balance  Static Standing-Level of Assistance: Minimum assistance  Dynamic Standing Balance  Dynamic Standing-Balance:  (mod x1)    Sensation:  Light Touch: No apparent deficits    Coordination:  Movements are Fluid and Coordinated: Yes   Hand Function:  Hand Function  Gross Grasp: Functional  Coordination: Functional  Extremities: RUE   RUE : Within Functional Limits and LUE   LUE: Within Functional Limits (BUEs grossly WFLs, at least 3+/5)      Outcome Measures: [facility] Daily Activity  Putting on and taking off regular lower body clothing: Total  Bathing (including washing, rinsing, drying): A lot  Putting on and taking off regular upper body clothing: A little  Toileting, which includes using toilet, bedpan or urinal: A lot  Taking care of personal grooming such as brushing teeth: None  Eating Meals: None  Daily Activity - Total Score: 16      Education Documentation  Body Mechanics, taught by Danyel Parr OT at 6/25/2024  2:24 PM.  Learner: Patient  Readiness: Acceptance  Method: Explanation  Response: Needs Reinforcement    Precautions, taught by Danyel Parr OT at 6/25/2024  2:24 PM.  Learner:  Patient  Readiness: Acceptance  Method: Explanation  Response: Needs Reinforcement    Education Comments  No comments found.      Goals:  Encounter Problems       Encounter Problems (Active)       ADLs       Patient with complete upper body dressing with modified independent level of assistance donning and doffing all UE clothes while edge of bed        Start:  06/25/24    Expected End:  07/10/24            Patient with complete lower body dressing with set-up level of assistance donning and doffing all LE clothes  with PRN adaptive equipment while edge of bed        Start:  06/25/24    Expected End:  07/10/24            Patient will complete daily grooming tasks  with CGA level of assistance and PRN adaptive equipment while standing.       Start:  06/25/24    Expected End:  07/10/24            Patient will complete toileting including hygiene clothing management/hygiene with minimal assist  level of assistance and bedside commode.       Start:  06/25/24    Expected End:  07/10/24               BALANCE       Pt will maintain static and dynamic standing balance during ADL task with minimal assist  level of assistance in order to demonstrate decreased risk of falling and improved postural control.       Start:  06/25/24    Expected End:  07/10/24               COGNITION/SAFETY       Patient will recall and adhere to R LE toe touch weight bearing with all ADL and functional mobility in order to promote healing and safety with functional tasks       Start:  06/25/24    Expected End:  07/10/24               TRANSFERS       Patient will perform bed mobility minimal assist  level of assistance and bed rails in order to improve safety and independence with mobility       Start:  06/25/24    Expected End:  07/10/24            Patient will complete sit to stand transfer with minimal assist  level of assistance and least restrictive device in order to improve safety and prepare for out of bed mobility.       Start:  06/25/24     Expected End:  07/10/24

## 2024-06-25 NOTE — PROGRESS NOTES
6/25/2024 12:45 PM I met with patient and his wife. I discussed that patient will be seen by PT and OT.  We discussed rehab options will be based on PT and OT recommendations. Wife requests referrals sent to  Acute Rehab  and [facility]

## 2024-06-25 NOTE — PROGRESS NOTES
Physical Therapy    Physical Therapy Evaluation    Patient Name: Nasir Camacho  MRN: [id number]  Today's Date: 6/25/2024   Time Calculation  Start Time: 1350  Stop Time: 1420  Time Calculation (min): 30 min    Assessment/Plan   PT Assessment  PT Assessment Results: Decreased strength, Decreased endurance, Impaired balance, Decreased mobility, Decreased coordination, Orthopedic restrictions, Pain  Rehab Prognosis: Good  Barriers to Discharge: Multiple steps to access home, bed, and bathroom  Evaluation/Treatment Tolerance: Patient tolerated treatment well, Patient limited by pain (Recurring muscle spasms of mary LLE)  Medical Staff Made Aware: Yes  Strengths: Ability to acquire knowledge, Premorbid level of function, Support and attitude of living partners  Barriers to Participation:  (None identified)  End of Session Communication: Bedside nurse  Assessment Comment: Pt presents today with decreased BLE strength, balance, coordination, and recent fall. Currently, pt is below the reported PLOF requiring mod assist for bed mobility and transfers. Continued PT would benefit the pt to address the above factors and bring the pt closer to the PLOF while reducing fall risk.  End of Session Patient Position: Bed, 3 rail up, Alarm on  IP OR SWING BED PT PLAN  Inpatient or Swing Bed: Inpatient  PT Plan  Treatment/Interventions: Bed mobility, Transfer training, Gait training, Balance training, Strengthening, Endurance training, Therapeutic exercise, Therapeutic activity, Home exercise program, Positioning, Postural re-education  PT Plan: Ongoing PT  PT Frequency: 5 times per week  PT Discharge Recommendations: Moderate intensity level of continued care  Equipment Recommended upon Discharge: Wheeled walker  PT Recommended Transfer Status: Assist x1, Assistive device  PT - OK to Discharge: Yes (Per PT POC)    Subjective   General Visit Information:  General  Reason for Referral: 70 y/o F presenting s/p fall. pt discovered to have  R-sided acetabular fx.  Referred By: JONATHAN Angela  Past Medical History Relevant to Rehab: CVA, ICH, TAVR  Family/Caregiver Present: Yes  Caregiver Feedback: Wife present  Prior to Session Communication: Bedside nurse  Patient Position Received: Bed, 2 rail up, Alarm on  Preferred Learning Style: auditory, kinesthetic, visual  General Comment: Pt supine in bed upon PT arrival. Cleared to participate with RN, and agreeable to PT evaluation.  Home Living:  Home Living  Type of Home: House  Lives With: Spouse  Home Adaptive Equipment:  (Possible walker)  Home Layout: Two level, Laundry in basement, Bed/bath upstairs, Stairs to alternate level with rails  Alternate Level Stairs-Rails: Left  Alternate Level Stairs-Number of Steps: 13 steps to second level. 13 steps to basement  Home Access: Stairs to enter with rails  Entrance Stairs-Rails: Both  Entrance Stairs-Number of Steps: 2+1  Bathroom Shower/Tub: Walk-in shower  Bathroom Toilet: Standard  Bathroom Equipment: Grab bars in shower, Shower chair without back  Prior Level of Function:  Prior Function Per Pt/Caregiver Report  Level of [place]: Independent with ADLs and functional transfers  Receives Help From: Family  ADL Assistance: Independent  Homemaking Assistance:  (Pt reports wife primarily completes IADLs. 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 Pt denies additional recent falls within the last 6 months other than the admitting fall.  Precautions:  Precautions  LE Weight Bearing Status: Right Toe-Touch Weight Bearing  Medical Precautions: Fall precautions    Objective   Pain:  Pain Assessment  Pain Assessment: 0-10  0-10 (Numeric) Pain Score: 2  Pain Type: Acute pain  Pain Location: Hip  Pain Orientation: Right  Pain Interventions: Ambulation/increased activity  Response to Interventions: 4/10  pain reported  Cognition:  Cognition  Orientation Level: Oriented X4  Insight: Within function limits  Impulsive: Within functional limits    Activity Tolerance  Activity Tolerance Comments: Fair tolerance to sitting and standing activity    Sensation  Sensation Comment: Pt reporting paresthesia in RUE and RLE    Coordination  Movements are Fluid and Coordinated: No  Finger to Nose: Intact  Coordination Comment: Pt demonstrates difficulty with finger to thumb opposition on the L hand compared to the right hand    Postural Control  Trunk Control: Flexion in trunk in standing with FWW support  Posture Comment: Rounded shoulders in sitting    Static Sitting Balance  Static Sitting-Balance Support: Bilateral upper extremity supported, Feet supported  Static Sitting-Level of Assistance: Close supervision  Dynamic Sitting Balance  Dynamic Sitting-Balance Support: Bilateral upper extremity supported, Feet supported  Dynamic Sitting-Comments: With CGA    Static Standing Balance  Static Standing-Balance Support: Bilateral upper extremity supported  Static Standing-Level of Assistance: Contact guard  Static Standing-Comment/Number of Minutes: With FWW support  Dynamic Standing Balance  Dynamic Standing-Balance Support: Bilateral upper extremity supported  Dynamic Standing-Comments: Mod assist with FWW support  Functional Assessments:  Bed Mobility  Bed Mobility: Yes  Bed Mobility 1  Bed Mobility 1: Supine to sitting  Level of Assistance 1: Close supervision, Minimal verbal cues  Bed Mobility Comments 1: HOB elevated. Pt able to self assist RUE off the EOB with significantly increased time. Bed rail used as well as UE push on bed to bring trunk into upright sitting. Pt denies dizziness in sitting. VC fro foot placement on floor for stability.  Bed Mobility 2  Bed Mobility  2: Sitting to supine  Level of Assistance 2: Moderate assistance  Bed Mobility Comments 2: HOB flat. Assist with BLE lift into bed and to initiate trunk  descent to bed.    Transfers  Transfer: Yes  Transfer 1  Transfer From 1: Bed to  Transfer to 1: Stand  Technique 1: Sit to stand  Transfer Device 1: Walker, Gait belt  Transfer Level of Assistance 1: Moderate assistance, Minimal verbal cues  Trials/Comments 1: Hospital bed raised. VC for BUE push from bed to stand instead of pulling from the walker. Increased pt effort to reach standing requiring assist.. Pt jeannine to maintain TTWb status on RLE with RLE in NWB unprompted. Pt denies dizziness in standing.  Transfers 2  Transfer From 2: Stand to  Transfer to 2: Bed  Technique 2: Stand to sit  Transfer Device 2: Walker, Gait belt  Transfer Level of Assistance 2: Minimum assistance, Minimal verbal cues  Trials/Comments 2: VC for LE positioning against bed before attempting to sit. VC for BUE reach for bed when sitting. Pt able to demonstrate RUE reach and grab on bed rail with LUE remaining on the walker. Assist provided for controlled descent to the bed.    Ambulation/Gait Training  Ambulation/Gait Training Performed: Yes  Ambulation/Gait Training 1  Surface 1: Level tile  Device 1: Rolling walker  Gait Support Devices: Gait belt  Assistance 1: Minimum assistance  Comments/Distance (ft) 1: About 5-6 hops right on the LLE. Pt flexed a the hips and trunk with gaze directed at the floor. Pt able to maintain TTWB status with RLE in NWB during trial.    Stairs  Stairs: No  Extremity/Trunk Assessments:  RLE   RLE : Exceptions to WFL  Strength RLE  R Hip Flexion:  (At least 2-/5 MMT)  R Knee Extension: 2+/5  R Ankle Dorsiflexion: 4-/5  R Ankle Plantar Flexion: 3+/5  LLE   LLE : Exceptions to WFL  Strength LLE  L Hip Flexion:  (At least 2+/5 MMT)  L Knee Extension: 4/5  L Ankle Dorsiflexion: 4/5  L Ankle Plantar Flexion:  (At least 3+/5 MMT)  Outcome Measures:  [facility] Basic Mobility  Turning from your back to your side while in a flat bed without using bedrails: A little  Moving from lying on your back to sitting on the side of  a flat bed without using bedrails: A little  Moving to and from bed to chair (including a wheelchair): A lot  Standing up from a chair using your arms (e.g. wheelchair or bedside chair): A lot  To walk in hospital room: A little  Climbing 3-5 steps with railing: Total  Basic Mobility - Total Score: 14    Encounter Problems       Encounter Problems (Active)       Balance       Goal 1 (Progressing)       Start:  06/25/24    Expected End:  07/09/24       Pt performs all sitting balance with supervision and standing balance with CGA using LRAD            Mobility       STG - Patient will ambulate (Progressing)       Start:  06/25/24    Expected End:  07/09/24       25 ft with CGA using LRAD            PT Problem       PT Goal 1 (Not Progressing)       Start:  06/25/24    Expected End:  07/09/24       Pt demonstrates IND in performing 2 sets of 12 reps prescribed BLE HEP            PT Transfers       STG - Patient to transfer to and from sit to supine (Progressing)       Start:  06/25/24    Expected End:  07/09/24       With min assist x 1         STG - Patient will transfer sit to and from stand (Progressing)       Start:  06/25/24    Expected End:  07/09/24       With CGA using LRAD              Education Documentation  Handouts, taught by David Melchor PT at 6/25/2024  3:04 PM.  Learner: Significant Other, Patient  Readiness: Acceptance  Method: Explanation, Demonstration  Response: Verbalizes Understanding    Precautions, taught by David Melchor PT at 6/25/2024  3:04 PM.  Learner: Significant Other, Patient  Readiness: Acceptance  Method: Explanation, Demonstration  Response: Verbalizes Understanding    Body Mechanics, taught by David Melchor PT at 6/25/2024  3:04 PM.  Learner: Significant Other, Patient  Readiness: Acceptance  Method: Explanation, Demonstration  Response: Verbalizes Understanding    Mobility Training, taught by David Melchor PT at 6/25/2024  3:04 PM.  Learner: Significant Other,  Patient  Readiness: Acceptance  Method: Explanation, Demonstration  Response: Verbalizes Understanding    Education Comments  No comments found.

## 2024-06-25 NOTE — PROGRESS NOTES
06/25/24 1226   Discharge Planning   Patient expects to be discharged to: [facility] vs SNF- need pt/ot eval which are pending and pain control prior to discharge.

## 2024-06-25 NOTE — PROGRESS NOTES
Nasir Camacho is a 69 y.o. male     Still with significant pain on minimal movement  Noted orthopedic recommendations  PT OT pending    Review of Systems     Constitutional: no fever, no chills, not feeling poorly, not feeling tired   Cardiovascular: no chest pain   Respiratory: no cough, wheezing or shortness of breath a  Gastrointestinal: no abdominal pain, no constipation, no melena, no nausea, no diarrhea, no vomiting and no blood in stools.   Neurological: no headache,   All other systems have been reviewed and are negative for complaint.       Vitals:    06/25/24 0805   BP: 167/50   Pulse: 58   Resp: 16   Temp: 36.8 °C (98.2 °F)   SpO2: 98%        Scheduled medications  acetaminophen, 650 mg, oral, q6h  amLODIPine, 5 mg, oral, Daily  aspirin, 81 mg, oral, Daily  atorvastatin, 40 mg, oral, Daily  carvedilol, 3.125 mg, oral, BID  cholecalciferol, 1,000 Units, oral, Daily  citalopram, 20 mg, oral, Daily  famotidine, 20 mg, oral, BID   Or  famotidine, 20 mg, intravenous, BID  losartan, 50 mg, oral, Daily  polyethylene glycol, 17 g, oral, Daily      Continuous medications     PRN medications  PRN medications: guaiFENesin, HYDROmorphone, melatonin, methocarbamol, ondansetron **OR** ondansetron, oxyCODONE    Lab Review   Results from last 7 days   Lab Units 06/25/24  0708 06/23/24  2336   WBC AUTO x10*3/uL 7.3 9.7   HEMOGLOBIN g/dL 10.6* 11.4*   HEMATOCRIT % 32.1* 34.9*   PLATELETS AUTO x10*3/uL 105* 127*     Results from last 7 days   Lab Units 06/25/24  0708 06/23/24  2336   SODIUM mmol/L 138 136   POTASSIUM mmol/L 4.1 4.5   CHLORIDE mmol/L 105 105   CO2 mmol/L 27 24   BUN mg/dL 20 35*   CREATININE mg/dL 1.02 1.24   CALCIUM mg/dL 8.8 9.2   PROTEIN TOTAL g/dL  --  6.4   BILIRUBIN TOTAL mg/dL  --  1.1   ALK PHOS U/L  --  48   ALT U/L  --  20   AST U/L  --  21   GLUCOSE mg/dL 89 108*            CT head wo IV contrast   Final Result   No evidence of acute intracranial hemorrhage or depressed calvarial   fracture.                   MACRO:   None        Signed by: Kevin Kimble 6/24/2024 12:24 AM   Dictation workstation:   [id number]      CT cervical spine wo IV contrast   Final Result   No evidence of acute fracture of the cervical spine.        Multilevel degenerative change of the cervical spine.        MACRO:   None        Signed by: Kevin Kimble 6/24/2024 12:26 AM   Dictation workstation:   [id number]      CT lumbar spine wo IV contrast   Final Result   No acute fracture of the lumbar spine.        Multilevel degenerative change of the lumbar spine.        MACRO:   None        Signed by: Kevin Kimble 6/24/2024 12:29 AM   Dictation workstation:   [id number]      CT pelvis wo IV contrast   Final Result   Acute comminuted fractures of the anterior, medial and posterior wall   and roof of the right acetabulum with displacement of fractures and   also acute fracture of the right superior pubic ramus proximally and   the right inferior pubic ramus distally.        Acute hemorrhage in the right posterior pelvis        Postsurgical change of internal fixation of right femur diaphysis   fracture.        MACRO:   None        Signed by: Kevin Kimble 6/24/2024 12:51 AM   Dictation workstation:   [id number]      CT femur right wo IV contrast   Final Result   Acute comminuted fractures of the anterior, medial and posterior wall   and roof of the right acetabulum with displacement of fractures and   also acute fracture of the right superior pubic ramus proximally and   the right inferior pubic ramus distally.        Acute hemorrhage in the right posterior pelvis        Postsurgical change of internal fixation of right femur diaphysis   fracture.        MACRO:   None        Signed by: Kevin Kimble 6/24/2024 12:51 AM   Dictation workstation:   [id number]      XR hip right with pelvis when performed 2 or 3 views   Final Result   No acute osseous abnormality of the pelvis, right femur, or right   knee.        Uncomplicated right  intramedullary femoral nail.        MACRO:   None.        Signed by: Jose Jason 6/23/2024 10:28 PM   Dictation workstation:   [id number]      XR femur right 2+ views   Final Result   No acute osseous abnormality of the pelvis, right femur, or right   knee.        Uncomplicated right intramedullary femoral nail.        MACRO:   None.        Signed by: Jose Jason 6/23/2024 10:28 PM   Dictation workstation:   [id number]      XR knee right 1-2 views   Final Result   No acute osseous abnormality of the pelvis, right femur, or right   knee.        Uncomplicated right intramedullary femoral nail.        MACRO:   None.        Signed by: Jose Jason 6/23/2024 10:28 PM   Dictation workstation:   [id number]            Physical Exam     Constitutional   General appearance: Alert and in no acute distress.     Pulmonary   Respiratory assessment: No respiratory distress, normal respiratory rhythm and effort.    Auscultation of Lungs: Clear bilateral breath sounds.   Cardiovascular   Auscultation of heart: Apical pulse normal, heart rate and rhythm normal, normal S1 and S2, no murmurs and no pericardial rub.    Exam for edema: No peripheral edema.   Abdomen   Abdominal Exam: No bruits, normal bowel sounds, soft, non-tender, no abdominal mass palpated.    Liver and Spleen exam: No hepato-splenomegaly.   Musculoskeletal   Tender right hip  Skin inspection: Normal skin color and pigmentation, normal skin turgor and no visible rash.   Neurologic   Cranial nerves: Nerves 2-12 were intact, no focal neuro defects.     Assessment/Plan      #Acute communicated fracture of right acetabulum  Seen by orthopedics and cleared for therapy  No surgical interventions  Continue pain control  PT OT    #Thrombocytopenia  #Anemia  Appears chronic  Will monitor    #Hypertension  Stable continue home medications    #Dyslipidemia  #History of CVA  #History of TAVR  Continue aspirin and statins with target LDL less than 70

## 2024-06-25 NOTE — CARE PLAN
The patient's goals for the shift include      The clinical goals for the shift include patient will report tolerable pain levels    Over the shift, the patient did not make progress toward the following goals.       Problem: Pain - Adult  Goal: Verbalizes/displays adequate comfort level or baseline comfort level  Outcome: Progressing     Problem: Safety - Adult  Goal: Free from fall injury  Outcome: Progressing     Problem: Chronic Conditions and Co-morbidities  Goal: Patient's chronic conditions and co-morbidity symptoms are monitored and maintained or improved  Outcome: Progressing

## 2024-06-26 VITALS
HEIGHT: 68 IN | SYSTOLIC BLOOD PRESSURE: 98 MMHG | DIASTOLIC BLOOD PRESSURE: 56 MMHG | BODY MASS INDEX: 25.01 KG/M2 | RESPIRATION RATE: 18 BRPM | HEART RATE: 68 BPM | TEMPERATURE: 98.2 F | WEIGHT: 165 LBS | OXYGEN SATURATION: 97 %

## 2024-06-26 LAB
ANION GAP SERPL CALC-SCNC: 11 MMOL/L (ref 10–20)
BUN SERPL-MCNC: 23 MG/DL (ref 6–23)
CALCIUM SERPL-MCNC: 8.6 MG/DL (ref 8.6–10.3)
CHLORIDE SERPL-SCNC: 104 MMOL/L (ref 98–107)
CO2 SERPL-SCNC: 26 MMOL/L (ref 21–32)
CREAT SERPL-MCNC: 1.05 MG/DL (ref 0.5–1.3)
EGFRCR SERPLBLD CKD-EPI 2021: 76 ML/MIN/1.73M*2
ERYTHROCYTE [DISTWIDTH] IN BLOOD BY AUTOMATED COUNT: 13.2 % (ref 11.5–14.5)
GLUCOSE SERPL-MCNC: 89 MG/DL (ref 74–99)
HCT VFR BLD AUTO: 31.7 % (ref 41–52)
HGB BLD-MCNC: 10.2 G/DL (ref 13.5–17.5)
MCH RBC QN AUTO: 32.5 PG (ref 26–34)
MCHC RBC AUTO-ENTMCNC: 32.2 G/DL (ref 32–36)
MCV RBC AUTO: 101 FL (ref 80–100)
NRBC BLD-RTO: 0 /100 WBCS (ref 0–0)
PLATELET # BLD AUTO: 120 X10*3/UL (ref 150–450)
POTASSIUM SERPL-SCNC: 4 MMOL/L (ref 3.5–5.3)
RBC # BLD AUTO: 3.14 X10*6/UL (ref 4.5–5.9)
SODIUM SERPL-SCNC: 137 MMOL/L (ref 136–145)
WBC # BLD AUTO: 6.8 X10*3/UL (ref 4.4–11.3)

## 2024-06-26 PROCEDURE — 80048 BASIC METABOLIC PNL TOTAL CA: CPT | Performed by: NURSE PRACTITIONER

## 2024-06-26 PROCEDURE — 97110 THERAPEUTIC EXERCISES: CPT | Mod: GP,CQ

## 2024-06-26 PROCEDURE — 2500000001 HC RX 250 WO HCPCS SELF ADMINISTERED DRUGS (ALT 637 FOR MEDICARE OP): Performed by: FAMILY MEDICINE

## 2024-06-26 PROCEDURE — 85027 COMPLETE CBC AUTOMATED: CPT | Performed by: NURSE PRACTITIONER

## 2024-06-26 PROCEDURE — 2500000004 HC RX 250 GENERAL PHARMACY W/ HCPCS (ALT 636 FOR OP/ED): Performed by: FAMILY MEDICINE

## 2024-06-26 PROCEDURE — 2500000001 HC RX 250 WO HCPCS SELF ADMINISTERED DRUGS (ALT 637 FOR MEDICARE OP): Performed by: NURSE PRACTITIONER

## 2024-06-26 PROCEDURE — 97116 GAIT TRAINING THERAPY: CPT | Mod: GP,CQ

## 2024-06-26 PROCEDURE — 99239 HOSP IP/OBS DSCHRG MGMT >30: CPT | Performed by: INTERNAL MEDICINE

## 2024-06-26 PROCEDURE — 36415 COLL VENOUS BLD VENIPUNCTURE: CPT | Performed by: NURSE PRACTITIONER

## 2024-06-26 RX ORDER — CYCLOBENZAPRINE HCL 5 MG
5 TABLET ORAL 3 TIMES DAILY
Start: 2024-06-26

## 2024-06-26 RX ORDER — OXYCODONE HYDROCHLORIDE 5 MG/1
5 TABLET ORAL EVERY 6 HOURS PRN
Start: 2024-06-26

## 2024-06-26 RX ORDER — CYCLOBENZAPRINE HCL 5 MG
5 TABLET ORAL 3 TIMES DAILY
Status: DISCONTINUED | OUTPATIENT
Start: 2024-06-26 | End: 2024-06-26 | Stop reason: HOSPADM

## 2024-06-26 ASSESSMENT — PAIN SCALES - GENERAL
PAINLEVEL_OUTOF10: 3
PAINLEVEL_OUTOF10: 3

## 2024-06-26 ASSESSMENT — COGNITIVE AND FUNCTIONAL STATUS - GENERAL
STANDING UP FROM CHAIR USING ARMS: A LOT
MOVING TO AND FROM BED TO CHAIR: A LITTLE
CLIMB 3 TO 5 STEPS WITH RAILING: TOTAL
MOVING FROM LYING ON BACK TO SITTING ON SIDE OF FLAT BED WITH BEDRAILS: A LITTLE
TURNING FROM BACK TO SIDE WHILE IN FLAT BAD: A LOT
WALKING IN HOSPITAL ROOM: A LITTLE
MOBILITY SCORE: 14

## 2024-06-26 ASSESSMENT — PAIN - FUNCTIONAL ASSESSMENT
PAIN_FUNCTIONAL_ASSESSMENT: 0-10
PAIN_FUNCTIONAL_ASSESSMENT: 0-10

## 2024-06-26 NOTE — CARE PLAN
Problem: Pain - Adult  Goal: Verbalizes/displays adequate comfort level or baseline comfort level  6/26/2024 1000 by Fiona Hoover RN  Outcome: Progressing  6/26/2024 0959 by Fiona Hoover RN  Outcome: Progressing     Problem: Safety - Adult  Goal: Free from fall injury  6/26/2024 1000 by Fiona Hoover RN  Outcome: Progressing  6/26/2024 0959 by Fiona Hoover RN  Outcome: Progressing     Problem: Discharge Planning  Goal: Discharge to home or other facility with appropriate resources  6/26/2024 1000 by Fiona Hoover RN  Outcome: Progressing  6/26/2024 0959 by Fiona Hoover RN  Outcome: Progressing     Problem: Chronic Conditions and Co-morbidities  Goal: Patient's chronic conditions and co-morbidity symptoms are monitored and maintained or improved  6/26/2024 1000 by Fiona Hoover RN  Outcome: Progressing  6/26/2024 0959 by Fiona Hoover RN  Outcome: Progressing     Problem: Skin  Goal: Prevent/manage excess moisture  Outcome: Progressing  Goal: Prevent/minimize sheer/friction injuries  Outcome: Progressing  Flowsheets (Taken 6/26/2024 1000)  Prevent/minimize sheer/friction injuries: Use pull sheet  Goal: Promote/optimize nutrition  Outcome: Progressing       The clinical goals for the shift include pain management

## 2024-06-26 NOTE — PROGRESS NOTES
Physical Therapy    Physical Therapy Treatment    Patient Name: Nasir Camacho  MRN: [id number]  Today's Date: 6/26/2024  Time Calculation  Start Time: 1330  Stop Time: 1355  Time Calculation (min): 25 min    Assessment/Plan   PT Assessment  Rehab Prognosis: Good  Barriers to Discharge: Multiple steps to access home, bed, and bathroom  End of Session Communication: Bedside nurse, PCT/NA/CTA  Assessment Comment: Pt requires physical assist to perform all OOB mobility tasks. Pt adheres to TTWB status although very effortful to ambulate. Pt self assisting with RLE movement, max encouragement to perform movement without self assist  End of Session Patient Position: Up in chair, Alarm off, caregiver present (RN and CTA notified of chair alarm)  PT Plan  Inpatient/Swing Bed or Outpatient: Inpatient  PT Plan  Treatment/Interventions: Bed mobility, Transfer training, Gait training  PT Plan: Ongoing PT  PT Frequency: 5 times per week  PT Discharge Recommendations: Moderate intensity level of continued care  Equipment Recommended upon Discharge: Wheeled walker  PT Recommended Transfer Status: Assist x1  PT - OK to Discharge: Yes (per POC)      General Visit Information:   PT  Visit  PT Received On: 06/26/24  General  Reason for Referral: 70 y/o F presenting s/p fall. pt discovered to have R-sided acetabular fx.  Referred By: JONATHAN Angela  Past Medical History Relevant to Rehab: CVA, ICH, TAVR  Prior to Session Communication: Bedside nurse  Patient Position Received: Bed, 3 rail up, Alarm on    Subjective   Precautions:  Precautions  LE Weight Bearing Status: Right Toe-Touch Weight Bearing  Medical Precautions: Fall precautions  Precautions Comment: Pt able to recall WB status  Vital Signs:       Objective   Pain:  Pain Assessment  Pain Assessment: 0-10  0-10 (Numeric) Pain Score: 3  Pain Type: Acute pain  Pain Location: Hip  Pain Orientation: Right  Cognition:     Coordination:     Postural Control:  Static Sitting Balance  Static  Sitting-Balance Support: Bilateral upper extremity supported, Feet supported  Static Sitting-Level of Assistance: Close supervision  Static Standing Balance  Static Standing-Balance Support: Bilateral upper extremity supported  Static Standing-Level of Assistance: Contact guard  Dynamic Standing Balance  Dynamic Standing-Balance Support: Left upper extremity supported  Dynamic Standing-Balance: Reaching for objects, Reaching across midline, Lateral lean  Dynamic Standing-Comments: MaxA for stabilization. Left lateral lean present with dynamic standing balance.  Extremity/Trunk Assessments:    Activity Tolerance:     Treatments:  Therapeutic Exercise  Therapeutic Exercise Performed: Yes  Therapeutic Exercise Activity 1: Pt performs x 15 reps hip ABD/ADD, LAQ, and alternating marches, mod difficulty performing on RLE       Bed Mobility  Bed Mobility: Yes  Bed Mobility 1  Bed Mobility 1: Supine to sitting  Level of Assistance 1: Moderate assistance  Bed Mobility Comments 1: HOB elevated. ModA for RLE assist and trunk elevation. Pt able to use bed rail for trunk elevation    Ambulation/Gait Training  Ambulation/Gait Training Performed: Yes  Ambulation/Gait Training 1  Surface 1: Level tile  Device 1: Rolling walker  Gait Support Devices: Gait belt  Assistance 1: Contact guard  Quality of Gait 1:  (hop to pattern)  Comments/Distance (ft) 1: 15 x 2 (Hop sequence, pt performed NWB 2/2 inability to maintain TTWB precautions. Very effortful to perform, pt fatigues easily and requires sitting rest break for recovery)  Transfers  Transfer: Yes  Transfer 1  Technique 1: Sit to stand  Transfer Device 1: Walker, Gait belt  Transfer Level of Assistance 1: Maximum assistance  Trials/Comments 1: Max VC for correct hand placement. MaxA for trunk elevation.  Transfers 2  Technique 2: Stand to sit  Transfer Device 2: Walker, Gait belt  Transfer Level of Assistance 2: Minimum assistance  Trials/Comments 2: Fair eccentric control, Toshia  required for eccentric control    Outcome Measures:  [facility] Basic Mobility  Turning from your back to your side while in a flat bed without using bedrails: A little  Moving from lying on your back to sitting on the side of a flat bed without using bedrails: A lot  Moving to and from bed to chair (including a wheelchair): A little  Standing up from a chair using your arms (e.g. wheelchair or bedside chair): A lot  To walk in hospital room: A little  Climbing 3-5 steps with railing: Total  Basic Mobility - Total Score: 14    Education Documentation  Handouts, taught by Yokasta Ramirez PTA at 6/26/2024  2:31 PM.  Learner: Patient  Readiness: Acceptance  Method: Explanation  Response: Verbalizes Understanding    Precautions, taught by Yokasta Ramirez PTA at 6/26/2024  2:31 PM.  Learner: Patient  Readiness: Acceptance  Method: Explanation  Response: Verbalizes Understanding    Body Mechanics, taught by Yokasta Ramirez PTA at 6/26/2024  2:31 PM.  Learner: Patient  Readiness: Acceptance  Method: Explanation  Response: Verbalizes Understanding    Mobility Training, taught by Yokasta Ramirez PTA at 6/26/2024  2:31 PM.  Learner: Patient  Readiness: Acceptance  Method: Explanation  Response: Verbalizes Understanding    Education Comments  No comments found.        OP EDUCATION:       Encounter Problems       Encounter Problems (Active)       Balance       Goal 1 (Progressing)       Start:  06/25/24    Expected End:  07/09/24       Pt performs all sitting balance with supervision and standing balance with CGA using LRAD            Mobility       STG - Patient will ambulate (Progressing)       Start:  06/25/24    Expected End:  07/09/24       25 ft with CGA using LRAD            PT Problem       PT Goal 1 (Progressing)       Start:  06/25/24    Expected End:  07/09/24       Pt demonstrates IND in performing 2 sets of 12 reps prescribed BLE HEP            PT Transfers       STG - Patient to transfer to and from sit to supine (Not  Progressing)       Start:  06/25/24    Expected End:  07/09/24       With min assist x 1         STG - Patient will transfer sit to and from stand (Not Progressing)       Start:  06/25/24    Expected End:  07/09/24       With CGA using LRAD            Pain - Adult

## 2024-06-26 NOTE — PROGRESS NOTES
Nasir Camacho is a 69 y.o. male     Getting spasms in the leg    Review of Systems     Constitutional: no fever, no chills, not feeling poorly, not feeling tired   Cardiovascular: no chest pain   Respiratory: no cough, wheezing or shortness of breath a  Gastrointestinal: no abdominal pain, no constipation, no melena, no nausea, no diarrhea, no vomiting and no blood in stools.   Neurological: no headache,   All other systems have been reviewed and are negative for complaint.       Vitals:    06/26/24 1125   BP: 139/50   Pulse: 59   Resp: 18   Temp: 37 °C (98.6 °F)   SpO2: 96%        Scheduled medications  acetaminophen, 650 mg, oral, q6h  amLODIPine, 5 mg, oral, Daily  aspirin, 81 mg, oral, Daily  atorvastatin, 40 mg, oral, Daily  carvedilol, 3.125 mg, oral, BID  cholecalciferol, 1,000 Units, oral, Daily  citalopram, 20 mg, oral, Daily  cyclobenzaprine, 5 mg, oral, TID  famotidine, 20 mg, oral, BID   Or  famotidine, 20 mg, intravenous, BID  losartan, 50 mg, oral, Daily  polyethylene glycol, 17 g, oral, Daily      Continuous medications     PRN medications  PRN medications: guaiFENesin, melatonin, ondansetron **OR** ondansetron, oxyCODONE    Lab Review   Results from last 7 days   Lab Units 06/26/24  0607 06/25/24  0708 06/23/24  2336   WBC AUTO x10*3/uL 6.8 7.3 9.7   HEMOGLOBIN g/dL 10.2* 10.6* 11.4*   HEMATOCRIT % 31.7* 32.1* 34.9*   PLATELETS AUTO x10*3/uL 120* 105* 127*     Results from last 7 days   Lab Units 06/26/24  0607 06/25/24  0708 06/23/24  2336   SODIUM mmol/L 137 138 136   POTASSIUM mmol/L 4.0 4.1 4.5   CHLORIDE mmol/L 104 105 105   CO2 mmol/L 26 27 24   BUN mg/dL 23 20 35*   CREATININE mg/dL 1.05 1.02 1.24   CALCIUM mg/dL 8.6 8.8 9.2   PROTEIN TOTAL g/dL  --   --  6.4   BILIRUBIN TOTAL mg/dL  --   --  1.1   ALK PHOS U/L  --   --  48   ALT U/L  --   --  20   AST U/L  --   --  21   GLUCOSE mg/dL 89 89 108*            CT head wo IV contrast   Final Result   No evidence of acute intracranial hemorrhage or  depressed calvarial   fracture.                  MACRO:   None        Signed by: Kevin Kimble 6/24/2024 12:24 AM   Dictation workstation:   [id number]      CT cervical spine wo IV contrast   Final Result   No evidence of acute fracture of the cervical spine.        Multilevel degenerative change of the cervical spine.        MACRO:   None        Signed by: Kevin Kimble 6/24/2024 12:26 AM   Dictation workstation:   [id number]      CT lumbar spine wo IV contrast   Final Result   No acute fracture of the lumbar spine.        Multilevel degenerative change of the lumbar spine.        MACRO:   None        Signed by: Kevin Kimble 6/24/2024 12:29 AM   Dictation workstation:   [id number]      CT pelvis wo IV contrast   Final Result   Acute comminuted fractures of the anterior, medial and posterior wall   and roof of the right acetabulum with displacement of fractures and   also acute fracture of the right superior pubic ramus proximally and   the right inferior pubic ramus distally.        Acute hemorrhage in the right posterior pelvis        Postsurgical change of internal fixation of right femur diaphysis   fracture.        MACRO:   None        Signed by: Kevin Kimble 6/24/2024 12:51 AM   Dictation workstation:   [id number]      CT femur right wo IV contrast   Final Result   Acute comminuted fractures of the anterior, medial and posterior wall   and roof of the right acetabulum with displacement of fractures and   also acute fracture of the right superior pubic ramus proximally and   the right inferior pubic ramus distally.        Acute hemorrhage in the right posterior pelvis        Postsurgical change of internal fixation of right femur diaphysis   fracture.        MACRO:   None        Signed by: Kevin Kimble 6/24/2024 12:51 AM   Dictation workstation:   [id number]      XR hip right with pelvis when performed 2 or 3 views   Final Result   No acute osseous abnormality of the pelvis, right femur, or right    knee.        Uncomplicated right intramedullary femoral nail.        MACRO:   None.        Signed by: Jose Jason 6/23/2024 10:28 PM   Dictation workstation:   [id number]      XR femur right 2+ views   Final Result   No acute osseous abnormality of the pelvis, right femur, or right   knee.        Uncomplicated right intramedullary femoral nail.        MACRO:   None.        Signed by: Jose Jason 6/23/2024 10:28 PM   Dictation workstation:   [id number]      XR knee right 1-2 views   Final Result   No acute osseous abnormality of the pelvis, right femur, or right   knee.        Uncomplicated right intramedullary femoral nail.        MACRO:   None.        Signed by: Jose Jason 6/23/2024 10:28 PM   Dictation workstation:   [id number]            Physical Exam     Constitutional   General appearance: Alert and in no acute distress.     Pulmonary   Respiratory assessment: No respiratory distress, normal respiratory rhythm and effort.    Auscultation of Lungs: Clear bilateral breath sounds.   Cardiovascular   Auscultation of heart: Apical pulse normal, heart rate and rhythm normal, normal S1 and S2, no murmurs and no pericardial rub.    Exam for edema: No peripheral edema.   Abdomen   Abdominal Exam: No bruits, normal bowel sounds, soft, non-tender, no abdominal mass palpated.    Liver and Spleen exam: No hepato-splenomegaly.   Musculoskeletal   Tender right hip  Skin inspection: Normal skin color and pigmentation, normal skin turgor and no visible rash.   Neurologic   Cranial nerves: Nerves 2-12 were intact, no focal neuro defects.     Assessment/Plan      #Acute communicated fracture of right acetabulum  Spasms in the leg  Will add Flexeril    #Thrombocytopenia  #Anemia  Appears chronic  Will monitor    #Hypertension  Stable continue home medications    #Dyslipidemia  #History of CVA  #History of TAVR  Continue aspirin and statins with target LDL less than 70

## 2024-06-26 NOTE — DISCHARGE SUMMARY
Discharge Diagnosis  Fall, initial encounter    Issues Requiring Follow-Up  Therapy    Test Results Pending At Discharge  Pending Labs       No current pending labs.            Hospital Course  Nasir Camacho is a 69 y.o. male presenting with fall while trying to change a light bulb  Does have rt sided acetabular fractrure and does have pain and difficulty walking, he was seen by ortho and recommended pain control ttwb and discharge planning   Started the patient on pain control and DVT prophylaxis  Seen by PT OT recommended SNF  Arrangements made and he will discharge to skilled nursing facility for therapy in stable condition  Home medications reviewed and resumed  We added Flexeril for leg spasms    Pertinent Physical Exam At Time of Discharge  Physical Exam    Constitutional   General appearance: Alert and in no acute distress.     Pulmonary   Respiratory assessment: No respiratory distress, normal respiratory rhythm and effort.    Auscultation of Lungs: Clear bilateral breath sounds.   Cardiovascular   Auscultation of heart: Apical pulse normal, heart rate and rhythm normal, normal S1 and S2, no murmurs and no pericardial rub.    Exam for edema: No peripheral edema.   Abdomen   Abdominal Exam: No bruits, normal bowel sounds, soft, non-tender, no abdominal mass palpated.    Liver and Spleen exam: No hepato-splenomegaly.     Inspection of digits and nails: No clubbing or cyanosis of the fingernails.    Inspection/palpation of joints, bones and muscles: No joint swelling. Normal movement of all extremities.   Skin   Skin inspection: Normal skin color and pigmentation, normal skin turgor and no visible rash.   Neurologic   Cranial nerves: Nerves 2-12 were intact, no focal neuro defects.       Home Medications     Medication List      START taking these medications     acetaminophen 325 mg tablet; Commonly known as: Tylenol; Take 2 tablets   (650 mg) by mouth every 6 hours if needed for mild pain (1 - 3) or    moderate pain (4 - 6).   cyclobenzaprine 5 mg tablet; Commonly known as: Flexeril; Take 1 tablet   (5 mg) by mouth 3 times a day.   oxyCODONE 5 mg immediate release tablet; Commonly known as: Roxicodone;   Take 1 tablet (5 mg) by mouth every 6 hours if needed for severe pain (7 -   10).   polyethylene glycol 17 gram packet; Commonly known as: Glycolax,   Miralax; Take 17 g by mouth once daily as needed (constipation).     CONTINUE taking these medications     amLODIPine 5 mg tablet; Commonly known as: Norvasc; TAKE 1 TABLET BY   MOUTH EVERY DAY   aspirin 81 mg chewable tablet   atorvastatin 40 mg tablet; Commonly known as: Lipitor; Take 1 tablet (40   mg) by mouth once daily.   carvedilol 3.125 mg tablet; Commonly known as: Coreg; TAKE 1 TABLET BY   MOUTH TWICE A DAY WITH MEALS   Centrum Silver Men 081--300 mcg tablet; Generic drug:   mv-min-folic-K1-lycopen-lutein   cholecalciferol 25 MCG (1000 UT) tablet; Commonly known as: Vitamin D-3   citalopram 20 mg tablet; Commonly known as: CeleXA; TAKE 1 TABLET BY   MOUTH EVERY DAY   losartan 50 mg tablet; Commonly known as: Cozaar       Outpatient Follow-Up  Future Appointments   Date Time Provider Department [place]   7/1/2024 10:00 AM TRANG Jo, CCC-A [id number] [facility]   7/1/2024 10:40 AM AMBAR Floyd-CNP [id number] [facility]   6/9/2025  8:20 AM Carroll Lai MD [id number] [facility]     Patient seen at bedside. Events from the last visit reviewed. Discussed with staff. Results of tests and investigations from last visit reviewed and discussed with patient/Family. Electronic chart on [facility] reviewed. Input / Recommendations  from consultants  appreciated and reviewed and agreed with.     discharge summary and profile completed. medications reviewed and discussed with patient and family.  scripts completed and signed.     total discharge time in excess of 30 minutes.    Marina Dunn MD

## 2024-06-26 NOTE — PROGRESS NOTES
06/26/24 1027   Discharge Planning   Patient expects to be discharged to: [facility]     Patient admitted for fall with fracture to hip socket, no surgical intervention per ortho consult    Radha MENDOZA confirmed [facility] is FOC  NP reports patient is med ready for dc today  Transport will be arranged if [facility] can accept today and once dc orders etc are in place    ADOD today  BARRIERS bed avail  DISPO [facility]

## 2024-06-27 ENCOUNTER — LAB REQUISITION (OUTPATIENT)
Dept: LAB | Facility: HOSPITAL | Age: 69
End: 2024-06-27
Payer: MEDICARE

## 2024-06-27 DIAGNOSIS — Z51.89 ENCOUNTER FOR OTHER SPECIFIED AFTERCARE: ICD-10-CM

## 2024-06-27 LAB
ANION GAP SERPL CALC-SCNC: 12 MMOL/L (ref 10–20)
BUN SERPL-MCNC: 24 MG/DL (ref 6–23)
CALCIUM SERPL-MCNC: 8.9 MG/DL (ref 8.6–10.3)
CHLORIDE SERPL-SCNC: 104 MMOL/L (ref 98–107)
CO2 SERPL-SCNC: 29 MMOL/L (ref 21–32)
CREAT SERPL-MCNC: 1.07 MG/DL (ref 0.5–1.3)
EGFRCR SERPLBLD CKD-EPI 2021: 74 ML/MIN/1.73M*2
ERYTHROCYTE [DISTWIDTH] IN BLOOD BY AUTOMATED COUNT: 13.3 % (ref 11.5–14.5)
GLUCOSE SERPL-MCNC: 82 MG/DL (ref 74–99)
HCT VFR BLD AUTO: 31.6 % (ref 41–52)
HGB BLD-MCNC: 10.3 G/DL (ref 13.5–17.5)
MCH RBC QN AUTO: 32.6 PG (ref 26–34)
MCHC RBC AUTO-ENTMCNC: 32.6 G/DL (ref 32–36)
MCV RBC AUTO: 100 FL (ref 80–100)
NRBC BLD-RTO: 0 /100 WBCS (ref 0–0)
PLATELET # BLD AUTO: 127 X10*3/UL (ref 150–450)
POTASSIUM SERPL-SCNC: 4.7 MMOL/L (ref 3.5–5.3)
RBC # BLD AUTO: 3.16 X10*6/UL (ref 4.5–5.9)
SODIUM SERPL-SCNC: 140 MMOL/L (ref 136–145)
WBC # BLD AUTO: 6.5 X10*3/UL (ref 4.4–11.3)

## 2024-06-27 PROCEDURE — 85027 COMPLETE CBC AUTOMATED: CPT

## 2024-06-27 PROCEDURE — 80048 BASIC METABOLIC PNL TOTAL CA: CPT

## 2024-07-01 ENCOUNTER — APPOINTMENT (OUTPATIENT)
Dept: OTOLARYNGOLOGY | Facility: CLINIC | Age: 69
End: 2024-07-01
Payer: MEDICARE

## 2024-07-01 ENCOUNTER — APPOINTMENT (OUTPATIENT)
Dept: AUDIOLOGY | Facility: CLINIC | Age: 69
End: 2024-07-01
Payer: MEDICARE

## 2024-07-02 ENCOUNTER — LAB REQUISITION (OUTPATIENT)
Dept: LAB | Facility: HOSPITAL | Age: 69
End: 2024-07-02

## 2024-07-02 DIAGNOSIS — Z51.89 ENCOUNTER FOR OTHER SPECIFIED AFTERCARE: ICD-10-CM

## 2024-07-02 LAB
ANION GAP SERPL CALC-SCNC: 12 MMOL/L (ref 10–20)
BUN SERPL-MCNC: 25 MG/DL (ref 6–23)
CALCIUM SERPL-MCNC: 9 MG/DL (ref 8.6–10.3)
CHLORIDE SERPL-SCNC: 103 MMOL/L (ref 98–107)
CO2 SERPL-SCNC: 27 MMOL/L (ref 21–32)
CREAT SERPL-MCNC: 1.11 MG/DL (ref 0.5–1.3)
EGFRCR SERPLBLD CKD-EPI 2021: 71 ML/MIN/1.73M*2
ERYTHROCYTE [DISTWIDTH] IN BLOOD BY AUTOMATED COUNT: 13 % (ref 11.5–14.5)
GLUCOSE SERPL-MCNC: 157 MG/DL (ref 74–99)
HCT VFR BLD AUTO: 31.1 % (ref 41–52)
HGB BLD-MCNC: 10.1 G/DL (ref 13.5–17.5)
MCH RBC QN AUTO: 32 PG (ref 26–34)
MCHC RBC AUTO-ENTMCNC: 32.5 G/DL (ref 32–36)
MCV RBC AUTO: 98 FL (ref 80–100)
NRBC BLD-RTO: 0 /100 WBCS (ref 0–0)
PLATELET # BLD AUTO: 229 X10*3/UL (ref 150–450)
POTASSIUM SERPL-SCNC: 4.3 MMOL/L (ref 3.5–5.3)
RBC # BLD AUTO: 3.16 X10*6/UL (ref 4.5–5.9)
SODIUM SERPL-SCNC: 138 MMOL/L (ref 136–145)
WBC # BLD AUTO: 7.4 X10*3/UL (ref 4.4–11.3)

## 2024-07-02 PROCEDURE — 85027 COMPLETE CBC AUTOMATED: CPT

## 2024-07-02 PROCEDURE — 80048 BASIC METABOLIC PNL TOTAL CA: CPT

## 2024-07-03 ENCOUNTER — LAB REQUISITION (OUTPATIENT)
Dept: LAB | Facility: HOSPITAL | Age: 69
End: 2024-07-03
Payer: MEDICARE

## 2024-07-03 DIAGNOSIS — Z51.89 ENCOUNTER FOR OTHER SPECIFIED AFTERCARE: ICD-10-CM

## 2024-07-03 LAB
ALBUMIN SERPL BCP-MCNC: 3.4 G/DL (ref 3.4–5)
ALP SERPL-CCNC: 61 U/L (ref 33–136)
ALT SERPL W P-5'-P-CCNC: 34 U/L (ref 10–52)
ANION GAP SERPL CALC-SCNC: 14 MMOL/L (ref 10–20)
AST SERPL W P-5'-P-CCNC: 30 U/L (ref 9–39)
BASOPHILS # BLD AUTO: 0.02 X10*3/UL (ref 0–0.1)
BASOPHILS NFR BLD AUTO: 0.3 %
BILIRUB SERPL-MCNC: 1.4 MG/DL (ref 0–1.2)
BUN SERPL-MCNC: 25 MG/DL (ref 6–23)
CALCIUM SERPL-MCNC: 8.7 MG/DL (ref 8.6–10.3)
CHLORIDE SERPL-SCNC: 105 MMOL/L (ref 98–107)
CO2 SERPL-SCNC: 24 MMOL/L (ref 21–32)
CREAT SERPL-MCNC: 1.05 MG/DL (ref 0.5–1.3)
EGFRCR SERPLBLD CKD-EPI 2021: 76 ML/MIN/1.73M*2
EOSINOPHIL # BLD AUTO: 0.22 X10*3/UL (ref 0–0.4)
EOSINOPHIL NFR BLD AUTO: 3.3 %
ERYTHROCYTE [DISTWIDTH] IN BLOOD BY AUTOMATED COUNT: 13.1 % (ref 11.5–14.5)
GLUCOSE SERPL-MCNC: 84 MG/DL (ref 74–99)
HCT VFR BLD AUTO: 30.1 % (ref 41–52)
HGB BLD-MCNC: 9.7 G/DL (ref 13.5–17.5)
IMM GRANULOCYTES # BLD AUTO: 0.03 X10*3/UL (ref 0–0.5)
IMM GRANULOCYTES NFR BLD AUTO: 0.4 % (ref 0–0.9)
LYMPHOCYTES # BLD AUTO: 1.7 X10*3/UL (ref 0.8–3)
LYMPHOCYTES NFR BLD AUTO: 25.4 %
MCH RBC QN AUTO: 31.8 PG (ref 26–34)
MCHC RBC AUTO-ENTMCNC: 32.2 G/DL (ref 32–36)
MCV RBC AUTO: 99 FL (ref 80–100)
MONOCYTES # BLD AUTO: 0.59 X10*3/UL (ref 0.05–0.8)
MONOCYTES NFR BLD AUTO: 8.8 %
NEUTROPHILS # BLD AUTO: 4.12 X10*3/UL (ref 1.6–5.5)
NEUTROPHILS NFR BLD AUTO: 61.8 %
NRBC BLD-RTO: 0 /100 WBCS (ref 0–0)
PLATELET # BLD AUTO: 208 X10*3/UL (ref 150–450)
POTASSIUM SERPL-SCNC: 4.7 MMOL/L (ref 3.5–5.3)
PROT SERPL-MCNC: 5.9 G/DL (ref 6.4–8.2)
RBC # BLD AUTO: 3.05 X10*6/UL (ref 4.5–5.9)
SODIUM SERPL-SCNC: 138 MMOL/L (ref 136–145)
WBC # BLD AUTO: 6.7 X10*3/UL (ref 4.4–11.3)

## 2024-07-03 PROCEDURE — 80053 COMPREHEN METABOLIC PANEL: CPT

## 2024-07-03 PROCEDURE — 85025 COMPLETE CBC W/AUTO DIFF WBC: CPT

## 2024-07-08 ENCOUNTER — LAB REQUISITION (OUTPATIENT)
Dept: LAB | Facility: HOSPITAL | Age: 69
End: 2024-07-08
Payer: MEDICARE

## 2024-07-08 DIAGNOSIS — Z51.89 ENCOUNTER FOR OTHER SPECIFIED AFTERCARE: ICD-10-CM

## 2024-07-08 LAB
APPEARANCE UR: CLEAR
BILIRUB UR STRIP.AUTO-MCNC: NEGATIVE MG/DL
COLOR UR: ABNORMAL
GLUCOSE UR STRIP.AUTO-MCNC: NORMAL MG/DL
KETONES UR STRIP.AUTO-MCNC: NEGATIVE MG/DL
LEUKOCYTE ESTERASE UR QL STRIP.AUTO: NEGATIVE
NITRITE UR QL STRIP.AUTO: NEGATIVE
PH UR STRIP.AUTO: 5.5 [PH]
PROT UR STRIP.AUTO-MCNC: NEGATIVE MG/DL
RBC # UR STRIP.AUTO: NEGATIVE /UL
SP GR UR STRIP.AUTO: 1.02
UROBILINOGEN UR STRIP.AUTO-MCNC: ABNORMAL MG/DL

## 2024-07-08 PROCEDURE — 81003 URINALYSIS AUTO W/O SCOPE: CPT

## 2024-07-08 PROCEDURE — 87086 URINE CULTURE/COLONY COUNT: CPT

## 2024-07-09 ENCOUNTER — HOME HEALTH ADMISSION (OUTPATIENT)
Dept: HOME HEALTH SERVICES | Facility: HOME HEALTH | Age: 69
End: 2024-07-09
Payer: MEDICARE

## 2024-07-09 ENCOUNTER — DOCUMENTATION (OUTPATIENT)
Dept: HOME HEALTH SERVICES | Facility: HOME HEALTH | Age: 69
End: 2024-07-09

## 2024-07-09 ENCOUNTER — TELEPHONE (OUTPATIENT)
Dept: PHYSICAL MEDICINE AND REHAB | Facility: CLINIC | Age: 69
End: 2024-07-09
Payer: MEDICARE

## 2024-07-09 ENCOUNTER — TELEPHONE (OUTPATIENT)
Dept: REHABILITATION | Facility: REHABILITATION | Age: 69
End: 2024-07-09

## 2024-07-09 LAB — BACTERIA UR CULT: NORMAL

## 2024-07-09 NOTE — HH CARE COORDINATION
Home Care received a Referral for Nursing, Physical Therapy, and Occupational Therapy. We have processed the referral for a Start of Care on 24-48 HOURS POST DC .     If you have any questions or concerns, please feel free to contact us at [phone]. Follow the prompts, enter your five digit zip code, and you will be directed to your care team on [facility] 1.

## 2024-07-11 ENCOUNTER — HOME CARE VISIT (OUTPATIENT)
Dept: HOME HEALTH SERVICES | Facility: HOME HEALTH | Age: 69
End: 2024-07-11
Payer: MEDICARE

## 2024-07-11 VITALS — DIASTOLIC BLOOD PRESSURE: 65 MMHG | HEART RATE: 56 BPM | SYSTOLIC BLOOD PRESSURE: 100 MMHG | OXYGEN SATURATION: 96 %

## 2024-07-11 PROCEDURE — 169592 NO-PAY CLAIM PROCEDURE

## 2024-07-11 PROCEDURE — G0151 HHCP-SERV OF PT,EA 15 MIN: HCPCS | Mod: HHH

## 2024-07-11 ASSESSMENT — ENCOUNTER SYMPTOMS
PAIN LOCATION - PAIN FREQUENCY: CONSTANT
HIGHEST PAIN SEVERITY IN PAST 24 HOURS: 4/10
PAIN: 1
LOWEST PAIN SEVERITY IN PAST 24 HOURS: 2/10
PERSON REPORTING PAIN: PATIENT
PAIN LOCATION: RIGHT HIP
PAIN LOCATION - PAIN SEVERITY: 2/10

## 2024-07-11 ASSESSMENT — ACTIVITIES OF DAILY LIVING (ADL)
AMBULATION ASSISTANCE ON FLAT SURFACES: 1
OASIS_M1830: 03
ENTERING_EXITING_HOME: NEEDS ASSISTANCE

## 2024-07-12 ENCOUNTER — DOCUMENTATION (OUTPATIENT)
Dept: PULMONOLOGY | Facility: HOSPITAL | Age: 69
End: 2024-07-12
Payer: MEDICARE

## 2024-07-12 ENCOUNTER — HOME CARE VISIT (OUTPATIENT)
Dept: HOME HEALTH SERVICES | Facility: HOME HEALTH | Age: 69
End: 2024-07-12
Payer: MEDICARE

## 2024-07-12 DIAGNOSIS — R35.0 URINARY FREQUENCY: Primary | ICD-10-CM

## 2024-07-12 PROCEDURE — G0152 HHCP-SERV OF OT,EA 15 MIN: HCPCS | Mod: HHH

## 2024-07-12 SDOH — ECONOMIC STABILITY: HOUSING INSECURITY: HOME SAFETY: RECOMMEND USE OF BSC FOR SHOWER CHAIR. PAITENT WITH SMALL SPACE IN BATHROOM

## 2024-07-12 ASSESSMENT — ACTIVITIES OF DAILY LIVING (ADL)
BATHING_CURRENT_FUNCTION: MODERATE ASSIST
TOILETING: SUPERVISION
TOILETING: 1
CURRENT_FUNCTION: MINIMUM ASSIST
BATHING ASSESSED: 1
DRESSING_LB_CURRENT_FUNCTION: MINIMUM ASSIST
PHYSICAL TRANSFERS ASSESSED: 1

## 2024-07-12 NOTE — PROGRESS NOTES
Patient have urinary frequency after pelvic fx; urine negative and culture negative;   was placed on flomax at rehab but not helping, in fact making it worse    Will get him to urology to evaluate further for outflow obstruction; perhaps made worse by pain meds

## 2024-07-16 ENCOUNTER — HOME CARE VISIT (OUTPATIENT)
Dept: HOME HEALTH SERVICES | Facility: HOME HEALTH | Age: 69
End: 2024-07-16
Payer: MEDICARE

## 2024-07-16 VITALS
SYSTOLIC BLOOD PRESSURE: 110 MMHG | DIASTOLIC BLOOD PRESSURE: 50 MMHG | RESPIRATION RATE: 16 BRPM | TEMPERATURE: 98.1 F | HEART RATE: 60 BPM

## 2024-07-16 PROCEDURE — G0151 HHCP-SERV OF PT,EA 15 MIN: HCPCS | Mod: HHH

## 2024-07-16 ASSESSMENT — ENCOUNTER SYMPTOMS
PERSON REPORTING PAIN: PATIENT
PAIN: 1
LOWEST PAIN SEVERITY IN PAST 24 HOURS: 2/10
SUBJECTIVE PAIN PROGRESSION: UNCHANGED
PAIN SEVERITY GOAL: 0/10
PAIN LOCATION: RIGHT HIP
PAIN LOCATION - PAIN SEVERITY: 3/10
HIGHEST PAIN SEVERITY IN PAST 24 HOURS: 5/10

## 2024-07-16 NOTE — HOME HEALTH
S: PT follow up visit. Pt being seen for decreased functional mobility related to R acetabular fx without surgical intervention.    B: Pt reports he did not sleep well last night and rates current pain level at 2-5/10. He stopped taking flomax due to difficulty making it to the bathroom in time. His daughter is concerned with him transferring to the bedside commode on his own and descending stairs to the garage to exit the house.    A: PT raised height of bedside commode for optimal ergonomics and to reduce risk of falls. Pt performed transfer training from transport chair to bed, then bed to bedside commode and back with supervision assist demonstrating good technique and stability. Pt educated on need to be extra cautious at night when transferring due to decreased light and being fatigued. Pt performed gait training of 50 ft using wheeled walker with SBA and wheelchair follow, demonstrating ability to maintain TTWB status. Pt instructed in and performed seated B LE AROM exercises, seated dips for B triceps strengthening, and seated R hamstring stretches. Written instructions of above exercises issued and reviewed for HEP with good understanding. PT assessed stairs to garage as well as out back of house, and recommended pt obtain a portable ramp for optimal safety, as the middle steps are too shallow for 4 legs of walker to fit on them, and there is too much of a gap to place walker to lowest level and be able to support himself.     R: Pt demonstrating improved transfers and requires continued PT for further strengthening, gait and balance training to improve functional mobility and independence.

## 2024-07-17 ENCOUNTER — LAB (OUTPATIENT)
Dept: LAB | Facility: LAB | Age: 69
End: 2024-07-17
Payer: MEDICARE

## 2024-07-17 ENCOUNTER — HOME CARE VISIT (OUTPATIENT)
Dept: HOME HEALTH SERVICES | Facility: HOME HEALTH | Age: 69
End: 2024-07-17
Payer: MEDICARE

## 2024-07-17 ENCOUNTER — OFFICE VISIT (OUTPATIENT)
Dept: UROLOGY | Facility: HOSPITAL | Age: 69
End: 2024-07-17
Payer: MEDICARE

## 2024-07-17 DIAGNOSIS — E87.5 HYPERKALEMIA: ICD-10-CM

## 2024-07-17 DIAGNOSIS — Z12.5 SCREENING PSA (PROSTATE SPECIFIC ANTIGEN): ICD-10-CM

## 2024-07-17 DIAGNOSIS — R35.0 URINARY FREQUENCY: ICD-10-CM

## 2024-07-17 LAB — POTASSIUM SERPL-SCNC: 4.7 MMOL/L (ref 3.5–5.3)

## 2024-07-17 PROCEDURE — 99214 OFFICE O/P EST MOD 30 MIN: CPT | Performed by: UROLOGY

## 2024-07-17 PROCEDURE — 51798 US URINE CAPACITY MEASURE: CPT | Performed by: UROLOGY

## 2024-07-17 PROCEDURE — 84132 ASSAY OF SERUM POTASSIUM: CPT

## 2024-07-17 PROCEDURE — G0103 PSA SCREENING: HCPCS

## 2024-07-17 PROCEDURE — 99204 OFFICE O/P NEW MOD 45 MIN: CPT | Performed by: UROLOGY

## 2024-07-17 PROCEDURE — 36415 COLL VENOUS BLD VENIPUNCTURE: CPT

## 2024-07-17 PROCEDURE — 1111F DSCHRG MED/CURRENT MED MERGE: CPT | Performed by: UROLOGY

## 2024-07-17 RX ORDER — SOLIFENACIN SUCCINATE 5 MG/1
5 TABLET, FILM COATED ORAL DAILY
Qty: 30 TABLET | Refills: 11 | Status: SHIPPED | OUTPATIENT
Start: 2024-07-17 | End: 2025-07-17

## 2024-07-17 NOTE — PROGRESS NOTES
HPI    69 y.o. male being seen with the following problem list:    Problem list:  Hip fracture 6/2024  2. Bothersome urinary frequency    07/17/24 -seen in consultation.  At baseline has some bothersome urinary frequency, but since his pelvic fracture is gotten much worse.  Is now getting somewhat better.  Started on Flomax but this did not help.  PVR today 64 ml.  No gross hematuria.  He reports no gross hematuria around the time of his pelvic fracture.  No UTIs.  Is BRCA positive.  No history of prostate cancer.      Lab Results   Component Value Date    PSA 1.91 04/01/2024    PSA 3.87 03/29/2021    PSA 1.32 06/25/2018         Current Medications:  Current Outpatient Medications   Medication Sig Dispense Refill    acetaminophen (Tylenol) 325 mg tablet Take 2 tablets (650 mg) by mouth every 6 hours if needed for mild pain (1 - 3) or moderate pain (4 - 6).      amLODIPine (Norvasc) 10 mg tablet Take 10 mg by mouth once daily. Indications: high blood pressure      aspirin 81 mg chewable tablet Chew 1 tablet (81 mg) once daily.      atorvastatin (Lipitor) 40 mg tablet Take 1 tablet (40 mg) by mouth once daily. 90 tablet 3    baclofen (Lioresal) 5 mg tablet Take 5 mg by mouth 3 times a day. Indications: muscle spasms      carvedilol (Coreg) 3.125 mg tablet TAKE 1 TABLET BY MOUTH TWICE A DAY WITH MEALS 180 tablet 3    cholecalciferol (Vitamin D-3) 25 MCG (1000 UT) tablet Take 1 tablet (1,000 Units) by mouth once daily.      citalopram (CeleXA) 20 mg tablet TAKE 1 TABLET BY MOUTH EVERY DAY 90 tablet 3    cyclobenzaprine (Flexeril) 5 mg tablet Take 1 tablet (5 mg) by mouth 3 times a day.      gabapentin (Neurontin) 300 mg capsule Take 300 mg by mouth 3 times a day. Indications: acute pain following an operation      losartan (Cozaar) 50 mg tablet Take 100 mg by mouth once daily.      mv-min-folic-K1-lycopen-lutein (Centrum Silver Men) 897--300 mcg tablet Take 1 tablet by mouth once daily.      oxyCODONE (Roxicodone)  5 mg immediate release tablet Take 1 tablet (5 mg) by mouth every 6 hours if needed for severe pain (7 - 10).      polyethylene glycol (Glycolax, Miralax) 17 gram packet Take 17 g by mouth once daily as needed (constipation).      tamsulosin (Flomax) 0.4 mg 24 hr capsule Take 0.4 mg by mouth once daily. Indications: enlarged prostate with urination problem       No current facility-administered medications for this visit.        Active Problems:  Nasir Camacho is a 69 y.o. male with the following Problems and Medications.  Patient Active Problem List   Diagnosis    Subacromial bursitis    Stroke (Multi)    Sprain of shoulder    Skin changes due to chronic exposure to nonionizing radiation, unspecified    Sensation of foreign body in eye    S/P TAVR (transcatheter aortic valve replacement)    Pulmonary nodule    Prostate disorder    Pain of right lower extremity    Other seborrheic keratosis    Inflamed seborrheic keratosis    Neuropathic pain of right lower extremity    Nephrolithiasis    Myopia with presbyopia    Myopia of both eyes    Melanocytic nevi of other parts of face    Laceration of right thumb    Hyperthyroidism    Hypercholesterolemia    Hordeolum externum of right upper eyelid    History of non-ST elevation myocardial infarction (NSTEMI)    Hemiparesis affecting dominant side as late effect of stroke (Multi)    Hematuria    Hemangioma of skin and subcutaneous tissue    Headache    Essential hypertension    Elevated PSA    Depression    MGD (meibomian gland disease)    Arteriosclerotic cardiovascular disease (ASCVD)    Accelerated hypertension    Abdominal pain    ICH (intracerebral hemorrhage) (Multi)    Fall, initial encounter    Fracture of anterior wall of acetabulum (Multi)     Current Outpatient Medications   Medication Sig Dispense Refill    acetaminophen (Tylenol) 325 mg tablet Take 2 tablets (650 mg) by mouth every 6 hours if needed for mild pain (1 - 3) or moderate pain (4 - 6).      amLODIPine  (Norvasc) 10 mg tablet Take 10 mg by mouth once daily. Indications: high blood pressure      aspirin 81 mg chewable tablet Chew 1 tablet (81 mg) once daily.      atorvastatin (Lipitor) 40 mg tablet Take 1 tablet (40 mg) by mouth once daily. 90 tablet 3    baclofen (Lioresal) 5 mg tablet Take 5 mg by mouth 3 times a day. Indications: muscle spasms      carvedilol (Coreg) 3.125 mg tablet TAKE 1 TABLET BY MOUTH TWICE A DAY WITH MEALS 180 tablet 3    cholecalciferol (Vitamin D-3) 25 MCG (1000 UT) tablet Take 1 tablet (1,000 Units) by mouth once daily.      citalopram (CeleXA) 20 mg tablet TAKE 1 TABLET BY MOUTH EVERY DAY 90 tablet 3    cyclobenzaprine (Flexeril) 5 mg tablet Take 1 tablet (5 mg) by mouth 3 times a day.      gabapentin (Neurontin) 300 mg capsule Take 300 mg by mouth 3 times a day. Indications: acute pain following an operation      losartan (Cozaar) 50 mg tablet Take 100 mg by mouth once daily.      mv-min-folic-K1-lycopen-lutein (Centrum Silver Men) 616--300 mcg tablet Take 1 tablet by mouth once daily.      oxyCODONE (Roxicodone) 5 mg immediate release tablet Take 1 tablet (5 mg) by mouth every 6 hours if needed for severe pain (7 - 10).      polyethylene glycol (Glycolax, Miralax) 17 gram packet Take 17 g by mouth once daily as needed (constipation).      tamsulosin (Flomax) 0.4 mg 24 hr capsule Take 0.4 mg by mouth once daily. Indications: enlarged prostate with urination problem       No current facility-administered medications for this visit.       PMH:  Past Medical History:   Diagnosis Date    Personal history of other diseases of the circulatory system 05/17/2021    History of aortic valve stenosis    Personal history of other endocrine, nutritional and metabolic disease     History of Graves' disease       PSH:  Past Surgical History:   Procedure Laterality Date    ANKLE SURGERY  10/06/2014    Ankle Surgery    CORONARY ANGIOPLASTY WITH STENT PLACEMENT  10/06/2014    Cath Stent Placement     CT ANGIO NECK  7/18/2021    CT NECK ANGIO W AND WO IV CONTRAST 7/18/2021 [facility]    CT HEAD ANGIO W AND WO IV CONTRAST  7/18/2021    CT HEAD ANGIO W AND WO IV CONTRAST 7/18/2021 [facility]    FEMUR FRACTURE SURGERY  11/06/2014    Femur Repair       FMH:  No family history on file.    SHx:  Social History     Tobacco Use    Smoking status: Never    Smokeless tobacco: Never   Substance Use Topics    Alcohol use: Yes     Comment: one drink weekly    Drug use: Never       Allergies:  Allergies   Allergen Reactions    Penicillin Unknown         Assessment/Plan  Significantly worse urinary frequency since his recent pelvic fracture, at baseline has some degree of frequency but now is much worse.  Discussed that this may be on its way to resolving given the improvement in the past few weeks, but for now we will start on Vesicare 5 mg daily.  Did not respond to Flomax.  Discussed side effects of Vesicare.  Will also bring him in for cystoscopy.  Though he reports no hematuria at the time of his pelvic fracture, we will evaluate for any urethral injury or bladder foreign body including bone chips.  Additionally, given his concern about prostate cancer we will check a PSA.  Last PSA 3 months ago looked okay.        Scribe Attestation  By signing my name below, I, Brandt Melo, attest that this documentation  has been prepared under the direction and in the presence of Landon Augustin MD.

## 2024-07-18 ENCOUNTER — HOME CARE VISIT (OUTPATIENT)
Dept: HOME HEALTH SERVICES | Facility: HOME HEALTH | Age: 69
End: 2024-07-18
Payer: MEDICARE

## 2024-07-18 LAB — PSA SERPL-MCNC: 1.94 NG/ML

## 2024-07-18 PROCEDURE — G0152 HHCP-SERV OF OT,EA 15 MIN: HCPCS | Mod: HHH

## 2024-07-18 SDOH — ECONOMIC STABILITY: HOUSING INSECURITY: HOME SAFETY: RECOMMEND WC INSTEAD OF TRANSPORT CHAIR

## 2024-07-18 ASSESSMENT — ACTIVITIES OF DAILY LIVING (ADL): DRESSING_LB_CURRENT_FUNCTION: MODERATE ASSIST

## 2024-07-19 ENCOUNTER — HOME CARE VISIT (OUTPATIENT)
Dept: HOME HEALTH SERVICES | Facility: HOME HEALTH | Age: 69
End: 2024-07-19
Payer: MEDICARE

## 2024-07-19 VITALS
HEART RATE: 66 BPM | RESPIRATION RATE: 16 BRPM | TEMPERATURE: 97.7 F | SYSTOLIC BLOOD PRESSURE: 120 MMHG | DIASTOLIC BLOOD PRESSURE: 52 MMHG

## 2024-07-19 PROCEDURE — G0151 HHCP-SERV OF PT,EA 15 MIN: HCPCS | Mod: HHH

## 2024-07-19 NOTE — Clinical Note
I would try his wife at [phone], or his daughter at [phone]. He is actually staying with his daughter currently due to his recent R acetabluar fx.  ----- Message -----  From: Alejandro Villeda MD MPH  Sent: 7/23/2024   7:51 AM EDT  To: Avery Guerrier, PT      Have been trying to get a hold of the patient     so far no response.    What number are you using  ----- Message -----  From: Avery Guerrier, PT  Sent: 7/20/2024   9:00 AM EDT  To: Alejandro Villeda MD MPH    I saw this pt yesterday for home health PT. He and his daughter are concerned because he is sleeping throughout the day and then has difficulty sleeping at night. They are wondering if some of his medications are causing the drowsiness. They mentioned that he is in the process of transitioning to a new PCP on your recommendation, and I encouraged him to set up an appointment soon to discuss his medications, but I also wanted to let you know in the meantime.

## 2024-07-19 NOTE — Clinical Note
I saw this pt yesterday for home health PT. He and his daughter are concerned because he is sleeping throughout the day and then has difficulty sleeping at night. They are wondering if some of his medications are causing the drowsiness. They mentioned that he is in the process of transitioning to a new PCP on your recommendation, and I encouraged him to set up an appointment soon to discuss his medications, but I also wanted to let you know in the meantime.

## 2024-07-20 NOTE — HOME HEALTH
S: PT follow up visit. Pt being seen for decreased functional mobility related to R acetabular fx without surgical intervention.    B: Pt reports he is feeling tired. He tends to sleep throughout the day and then is awake a lot in the night. He reports no falls, no change in medications, and rates current pain level at 0/10. His daughter is concerned that he has obtained scratches on his R LE and does not know how they got there. She believes he decreased sensation in that leg from his previous CVA and cannot feel when he is damaging it. She also is concerned that he might be on too high of doses of his medications and they are causing him to be drowsy all of the time. Pt states he feels the bedside commode is too high by 1 notch. Daughter states that pt has been walking in to use the toilet instead of the commode when it is by his bed at night.     A: Pt performed seated B LE AROM, and instructed in and performed standing R LE AROM exercises. Pictures with written instructions issued for HEP. PT lowered height of commode 1 notch. Pt instructed to only use the commode for now, whether it is over the toilet during the day or by his bed at night. Pt instructed to pay closer attention during his transfers to make sure he is not causing further damage to his leg that could cause an infection and rehospitalization. PT performed manual therapy for seated R hamstring stretch.    R: Pt demonstrating improved strength and requires continued PT for further strengthening, gait and balance training to improve functional mobility and independence.

## 2024-07-22 ENCOUNTER — HOME CARE VISIT (OUTPATIENT)
Dept: HOME HEALTH SERVICES | Facility: HOME HEALTH | Age: 69
End: 2024-07-22
Payer: MEDICARE

## 2024-07-22 VITALS
RESPIRATION RATE: 16 BRPM | DIASTOLIC BLOOD PRESSURE: 54 MMHG | SYSTOLIC BLOOD PRESSURE: 110 MMHG | TEMPERATURE: 98.2 F | HEART RATE: 60 BPM

## 2024-07-22 PROCEDURE — G0151 HHCP-SERV OF PT,EA 15 MIN: HCPCS | Mod: HHH

## 2024-07-22 NOTE — HOME HEALTH
S: PT follow up visit. Pt being seen for decreased functional mobility related to R acetabular fx without surgical intervention.    B: Pt reports he is doing well today. He is still feeling fatigued in general. He reports no falls, no change in medications, and rates current pain level at 4/10. His daughter discovered that the source of the scratches on his R Lower leg is a sharp bolt on the underside of his transport chair.    A: Pt instructed in and performed seated B knee flexion and extension, B hip flexion and abduction, and B ankle DF and PF, and seated rows and B trunk rotation using green resistance band 10x each, and performed standing R LE AROM exercises 10x each. PT performed manual therapy to R LE for hamstring stretch.     R: Pt demonstrating improved strength and requires continued PT for further strengthening, gait and balance training to improve functional mobility and endurance.

## 2024-07-23 ENCOUNTER — HOME CARE VISIT (OUTPATIENT)
Dept: HOME HEALTH SERVICES | Facility: HOME HEALTH | Age: 69
End: 2024-07-23
Payer: MEDICARE

## 2024-07-23 DIAGNOSIS — S32.301S: Primary | ICD-10-CM

## 2024-07-23 PROCEDURE — G0152 HHCP-SERV OF OT,EA 15 MIN: HCPCS | Mod: HHH

## 2024-07-23 RX ORDER — GABAPENTIN 100 MG/1
100 CAPSULE ORAL 3 TIMES DAILY
Qty: 90 CAPSULE | Refills: 11 | Status: SHIPPED | OUTPATIENT
Start: 2024-07-23 | End: 2025-07-23

## 2024-07-23 NOTE — PROGRESS NOTES
Patient is having postfracture pain and is having dry eyes and is from the higher dose gabapentin.  Will decrease it to 100 mg 3 times daily.

## 2024-07-24 ENCOUNTER — HOME CARE VISIT (OUTPATIENT)
Dept: HOME HEALTH SERVICES | Facility: HOME HEALTH | Age: 69
End: 2024-07-24
Payer: MEDICARE

## 2024-07-24 VITALS
HEART RATE: 77 BPM | OXYGEN SATURATION: 98 % | TEMPERATURE: 97.7 F | DIASTOLIC BLOOD PRESSURE: 52 MMHG | SYSTOLIC BLOOD PRESSURE: 110 MMHG

## 2024-07-24 PROCEDURE — G0157 HHC PT ASSISTANT EA 15: HCPCS | Mod: CQ,HHH

## 2024-07-24 SDOH — ECONOMIC STABILITY: HOUSING INSECURITY: HOME SAFETY: STILL NOT WBING TTWB AND IS NOT PUTTING ANY WEIGHT ON LLE AFFECTING BALANCE WITH CLOTHING MANAGEMENT

## 2024-07-24 ASSESSMENT — ENCOUNTER SYMPTOMS
PAIN LOCATION: RIGHT LEG
PAIN: 1
PAIN LOCATION - PAIN QUALITY: DULL
PERSON REPORTING PAIN: PATIENT
PAIN LOCATION - PAIN SEVERITY: 3/10

## 2024-07-24 ASSESSMENT — ACTIVITIES OF DAILY LIVING (ADL): DRESSING_LB_CURRENT_FUNCTION: CONTACT GUARD ASSIST

## 2024-07-29 ENCOUNTER — HOME CARE VISIT (OUTPATIENT)
Dept: HOME HEALTH SERVICES | Facility: HOME HEALTH | Age: 69
End: 2024-07-29
Payer: MEDICARE

## 2024-07-29 VITALS
TEMPERATURE: 98.3 F | RESPIRATION RATE: 16 BRPM | SYSTOLIC BLOOD PRESSURE: 130 MMHG | HEART RATE: 59 BPM | DIASTOLIC BLOOD PRESSURE: 52 MMHG

## 2024-07-29 PROCEDURE — G0151 HHCP-SERV OF PT,EA 15 MIN: HCPCS | Mod: HHH

## 2024-07-30 ENCOUNTER — OFFICE VISIT (OUTPATIENT)
Dept: ORTHOPEDIC SURGERY | Facility: HOSPITAL | Age: 69
End: 2024-07-30
Payer: MEDICARE

## 2024-07-30 ENCOUNTER — HOSPITAL ENCOUNTER (OUTPATIENT)
Dept: RADIOLOGY | Facility: HOSPITAL | Age: 69
Discharge: HOME | End: 2024-07-30
Payer: MEDICARE

## 2024-07-30 DIAGNOSIS — S32.461A CLOSED DISPLACED COMBINED TRANSVERSE-POSTERIOR FRACTURE OF RIGHT ACETABULUM, INITIAL ENCOUNTER (MULTI): Primary | ICD-10-CM

## 2024-07-30 DIAGNOSIS — S32.401D: ICD-10-CM

## 2024-07-30 PROCEDURE — 1159F MED LIST DOCD IN RCRD: CPT | Performed by: ORTHOPAEDIC SURGERY

## 2024-07-30 PROCEDURE — 72190 X-RAY EXAM OF PELVIS: CPT

## 2024-07-30 PROCEDURE — 27220 TREAT HIP SOCKET FRACTURE: CPT | Performed by: ORTHOPAEDIC SURGERY

## 2024-07-30 PROCEDURE — 99214 OFFICE O/P EST MOD 30 MIN: CPT | Mod: 57 | Performed by: ORTHOPAEDIC SURGERY

## 2024-07-30 PROCEDURE — 72190 X-RAY EXAM OF PELVIS: CPT | Performed by: RADIOLOGY

## 2024-07-30 PROCEDURE — 1125F AMNT PAIN NOTED PAIN PRSNT: CPT | Performed by: ORTHOPAEDIC SURGERY

## 2024-07-30 PROCEDURE — 99204 OFFICE O/P NEW MOD 45 MIN: CPT | Performed by: ORTHOPAEDIC SURGERY

## 2024-07-30 ASSESSMENT — PAIN SCALES - GENERAL: PAINLEVEL_OUTOF10: 3

## 2024-07-30 ASSESSMENT — PAIN - FUNCTIONAL ASSESSMENT: PAIN_FUNCTIONAL_ASSESSMENT: 0-10

## 2024-07-30 ASSESSMENT — PAIN DESCRIPTION - DESCRIPTORS: DESCRIPTORS: ACHING

## 2024-07-30 NOTE — HOME HEALTH
S: PT follow up visit. Pt being seen for decreased functional mobility related to R acetabular fx.    B: Pt reports he is doing well, but is concerned about the fact that he has to go back to his house and is uncertain how he will get in and if he will be able to get around as well as he has been at his daughter's house. He sees the orthopedist tomorrow. He reports no falls, no change in medications, and rates current pain level at 4/10.    A: Pt performed seated and standing B LE PREs using green resistance band, and sit to stand transfers from couch, first using wheeled walker and then transferring from couch straight to transport chair, which pt found to be easier than using the walker.    R: Pt demonstrating improved transfers and requires continued PT for further strengthening, gait and balance training to improve functional mobility and endurance.

## 2024-07-31 ENCOUNTER — APPOINTMENT (OUTPATIENT)
Dept: ORTHOPEDIC SURGERY | Facility: HOSPITAL | Age: 69
End: 2024-07-31
Payer: MEDICARE

## 2024-07-31 ENCOUNTER — HOME CARE VISIT (OUTPATIENT)
Dept: HOME HEALTH SERVICES | Facility: HOME HEALTH | Age: 69
End: 2024-07-31
Payer: MEDICARE

## 2024-08-01 ENCOUNTER — HOME CARE VISIT (OUTPATIENT)
Dept: HOME HEALTH SERVICES | Facility: HOME HEALTH | Age: 69
End: 2024-08-01
Payer: MEDICARE

## 2024-08-01 VITALS
SYSTOLIC BLOOD PRESSURE: 110 MMHG | TEMPERATURE: 98.5 F | RESPIRATION RATE: 16 BRPM | HEART RATE: 60 BPM | DIASTOLIC BLOOD PRESSURE: 60 MMHG

## 2024-08-01 PROCEDURE — G0151 HHCP-SERV OF PT,EA 15 MIN: HCPCS | Mod: HHH

## 2024-08-01 NOTE — HOME HEALTH
S: PT follow up visit. Pt being seen for decreased functional mobility related to R acetabular fx.    B: Pt reports he saw the orthopedist yesterday who increased his weight bearing to 30% and will follow up again in 6 weeks. Pt is back at his own house as his daughter will be out of town for the next week. Pt reports no falls, no change in medications, and rates current pain level at 2-3/10. Pt's wife is concerned about him getting off the couch to go to the bathroom by himself at night as the couch is low and he has difficulty with the transfer.     A: Pt performed seated B LE AROM and R LE PREs using green resistance band with his wife assisting for set up and PT educating her on proper technique and postioning of resistance band. Pt instructed in and performed weight shifts onto scale on floor to approximate putting 30% of his weight through his R LE. Pt peformed gait training with instruction to place heel on floor and follow through to toe off and to attempt to replicate the amount of weight he placed on the scale during R stance phase. Pt with good return demo. Pt demonstrated toilet transfers with good technique. Pt and wife educated in benefit of obtaining couch rail to assist in transfers to and from the couch, and instructed pt not to attempt transferring at night on his own until he obtains rail and practices using it with PT.    R: Pt demonstrating improved gait and requires continued PT for further strengthening, gait and balance training to improve functional mobility and endurance.

## 2024-08-05 ENCOUNTER — HOME CARE VISIT (OUTPATIENT)
Dept: HOME HEALTH SERVICES | Facility: HOME HEALTH | Age: 69
End: 2024-08-05
Payer: MEDICARE

## 2024-08-05 VITALS
SYSTOLIC BLOOD PRESSURE: 104 MMHG | TEMPERATURE: 98.4 F | RESPIRATION RATE: 16 BRPM | DIASTOLIC BLOOD PRESSURE: 50 MMHG | HEART RATE: 58 BPM

## 2024-08-05 PROCEDURE — G0151 HHCP-SERV OF PT,EA 15 MIN: HCPCS | Mod: HHH

## 2024-08-05 NOTE — HOME HEALTH
S: PT follow up visit. Pt being seen for decreased functional mobility related to R acetabular fx without surgical intervention.    B: Pt reports he has been working on the HEP a lot but has not been walking much other than going into the bathroom. He reports no falls, no change in medications, and rates current pain level at 3/10.    A: Pt performed gait training of 150 ft using wheeled walker with SBA and verbal cues for placing R heel on the floor to initiate stance phase, then putting rest of foot flat on the floor, and finally toeing off. Instructed in and performed stair navigation training out front door and down 3 more steps and back 2x using 2 railings  / wheeled walker with CGA and verbal cues for proper technique and sequencing. Pt instructed to try to get up to walk with assistance every 2 hours or so.    R: Pt demonstrating improved  and requires continued PT for further strengthening, gait and balance training to improve functional mobility and .    Plan for next visit - 30 day reassessment

## 2024-08-06 ENCOUNTER — HOME CARE VISIT (OUTPATIENT)
Dept: HOME HEALTH SERVICES | Facility: HOME HEALTH | Age: 69
End: 2024-08-06
Payer: MEDICARE

## 2024-08-06 DIAGNOSIS — I10 ACCELERATED HYPERTENSION: Primary | ICD-10-CM

## 2024-08-06 PROCEDURE — G0152 HHCP-SERV OF OT,EA 15 MIN: HCPCS | Mod: HHH

## 2024-08-06 RX ORDER — LOSARTAN POTASSIUM 50 MG/1
100 TABLET ORAL DAILY
Qty: 90 TABLET | Refills: 3 | Status: SHIPPED | OUTPATIENT
Start: 2024-08-06 | End: 2024-08-13

## 2024-08-06 RX ORDER — AMLODIPINE BESYLATE 10 MG/1
10 TABLET ORAL DAILY
Qty: 90 TABLET | Refills: 3 | Status: SHIPPED | OUTPATIENT
Start: 2024-08-06

## 2024-08-06 ASSESSMENT — ACTIVITIES OF DAILY LIVING (ADL)
DRESSING_LB_CURRENT_FUNCTION: MINIMUM ASSIST
TOILETING: 1
BATHING_CURRENT_FUNCTION: SUPERVISION
TOILETING: SUPERVISION
BATHING ASSESSED: 1

## 2024-08-06 ASSESSMENT — ENCOUNTER SYMPTOMS
PAIN LOCATION: RIGHT HIP
PERSON REPORTING PAIN: PATIENT
PAIN: 1
HIGHEST PAIN SEVERITY IN PAST 24 HOURS: 3/10

## 2024-08-06 NOTE — PROGRESS NOTES
ORTHOPAEDIC HISTORY AND PHYSICAL    History Of Present Illness  Orthopaedic Problems/Injuries:  Nasir Camacho is a 69 y.o. male presenting with right hip pain.  Patient sustained a fall and was seen [facility].  His fracture happened about a month ago.  Patient has been nonweightbearing.  He is currently living with his daughter and son-in-law.  He is having pain.  Pain radiates into the groin.  Pain is worse with any movement of activities and better with rest.  No numbness or tingling down the leg.  Patient has started physical therapy but limited in what he is able to do due to restrictions of weightbearing.      Review of Systems: 12 point ROS negative unless stated in HPI    Past Medical History  He has a past medical history of Personal history of other diseases of the circulatory system (05/17/2021) and Personal history of other endocrine, nutritional and metabolic disease.    Surgical History  He has a past surgical history that includes Coronary angioplasty with stent (10/06/2014); Ankle surgery (10/06/2014); Femur fracture surgery (11/06/2014); CT angio head w and wo IV contrast (7/18/2021); and CT angio neck (7/18/2021).     Social History  He reports that he has never smoked. He has never used smokeless tobacco. He reports current alcohol use. He reports that he does not use drugs.    Family History  No family history on file.     Allergies  Penicillin    Review of Systems     Physical Exam  Gen: The patient is alert and oriented ×3, is in no acute distress, and appear their stated age and weight.    Psychiatric: Mood and affect are appropriate.    Eyes: Sclera are white, and pupils are round and symmetric.    ENT: Mucous membranes are moist.     Neck: Supple. Thyroid is midline.    Respiratory: Respirations are nonlabored, chest rise is symmetric.    Cardiac: Rate is regular by palpation of distal pulses.     Abdomen: Nondistended.    Integument: No obvious cutaneous lesions are noted. No  signs of lymphangitis. No signs of systemic edema.    Patient was seen in wheelchair today.  Examination of the hip reveals no skin abnormalities.  No tenderness to palpation about the hip, knee and thigh area.  Range of motion of the hip reveals flexion past 90 degrees, patient has full extension, 40 degrees of hip abduction, 30 degrees of abduction, internal rotation to 20 degrees and external rotation to 45 degrees.  There is moderate pain with rotational range of motion of the hip.          Relevant Results  I personally reviewed his right hip radiograph that reveals T shaped type acetabular fracture with medial subluxation of the head and marginal impaction.  There is early callus formation.  Compared these to his previous radiographs patient had experienced significantly secondary Fracture displacement.    Assessment/Plan   Patient with displaced right acetabular fracture with incongruity of the hip joint.  I had a long discussion with patient and his family regarding treatment options.  I discussed with him the his hip joint is incongruent secondary to fracture displacement.  There is also marginal impaction.  Based on these features and subacute presentation I do not believe hip salvage with ORIF only is a feasible option.  At this point he will need ORIF and acute total hip replacement surgery.  Other option is to proceed with closed treatment and while there is consolidation of his fracture and he has symptomatic posttraumatic arthritis we can discuss proceeding with total hip replacement surgery.  I discussed the risk of secondary displacement of this fracture with resultant need significant malunion make reconstruction complicated.  Ultimately patient and his family elected to proceed close treatment with delayed reconstruction in the future as needed.  Patient would be 30 pounds flatfoot weightbearing.  I gave him an updated instruction for home physical therapy to mobilize patient.  Patient will take  aspirin for DVT prophylaxis.  Plan is to see him back in about 6 weeks for repeat radiographs AP pelvis and pelvic Judet radiographs.Patient ID: Nasir Camacho is a 69 y.o. male.    Procedures

## 2024-08-07 ENCOUNTER — HOME CARE VISIT (OUTPATIENT)
Dept: HOME HEALTH SERVICES | Facility: HOME HEALTH | Age: 69
End: 2024-08-07
Payer: MEDICARE

## 2024-08-07 VITALS
RESPIRATION RATE: 16 BRPM | HEART RATE: 64 BPM | TEMPERATURE: 98.5 F | DIASTOLIC BLOOD PRESSURE: 54 MMHG | SYSTOLIC BLOOD PRESSURE: 112 MMHG

## 2024-08-07 PROCEDURE — G0151 HHCP-SERV OF PT,EA 15 MIN: HCPCS | Mod: HHH

## 2024-08-07 NOTE — HOME HEALTH
S: PT follow up visit for reassessment. Pt being seen for weakness and impaired mobility related to R acetabluar fx without surgical intervention.    B: Pt reports his R thigh is more sore lately, which he attributes to putting increased weight bearing through his R LE. He reports no falls, no change in medications, and rates current pain level at 3.5/10. He has been working on the seated HEP but needs a reminder on how to do the standing exercises.    A: Pt presents with improved gait and balance as illustrated by Tinetti score of /28 today. Pt ambulates using wheeled walker with SBA and verbal cues for proper R foot placement and to maintain 30% weight bearing through R LE. Instructed in and performed transfers from couch to transport chair and back 4 times to find optimal placement of chair. Instructed pt to scoot toward end of couch where armrest is on his R side and have chair angled toward him on his L side so that he has something to use for support on both sides. Pt reported improved comfort and efficiency with this technique. Pt re-instructed in and performed standing R LE AROM exercises, and instructed in and performed standing R TKE using green resistance band. Instructed in and performed step ups onto bottom stair with 2 hand support using L LE 10x to focus on maintaining weight through R LE and B UE while transitioning L LE from floor to step. Pt fatigued with completion of session. He is demonstrating good improvement in transfers and gait although still limited by weight bearing restrictions.    R: Pt has demonstrated good progress thus far and requires continued PT for further strengthening, gait and balance training to improve functional mobility and independence.

## 2024-08-12 ENCOUNTER — HOME CARE VISIT (OUTPATIENT)
Dept: HOME HEALTH SERVICES | Facility: HOME HEALTH | Age: 69
End: 2024-08-12
Payer: MEDICARE

## 2024-08-12 VITALS
HEART RATE: 70 BPM | TEMPERATURE: 97.7 F | RESPIRATION RATE: 16 BRPM | SYSTOLIC BLOOD PRESSURE: 120 MMHG | DIASTOLIC BLOOD PRESSURE: 60 MMHG

## 2024-08-12 PROCEDURE — G0151 HHCP-SERV OF PT,EA 15 MIN: HCPCS | Mod: HHH

## 2024-08-12 NOTE — HOME HEALTH
Homebound status: Patient is homebound due to difficulty navigating stairs to leave the home    S: PT follow up visit. Pt being seen for decreased functional mobility related to R acetabular fx without surgical intervention.    B: Pt reports he is feeling ok today. He has cut back on the gabapentin which he attributes to his R LE hurting more lately. He reports no falls, no change in medications, and rates current pain level at 3/10.    A: Pt instructed in and performed stair navigation training to second floor of house using 1 railing on L side ascending and holding onto the wall on the opposite side, with SBA and verbal cues for proper sequencing. Pt required occasional reminders for proper sequencing, but was able to make it all the way up and down without loss of balance. Pt performed stair training out the front door and down the front steps, then walked down the driveway and onto the sidewalk for about 10 ft, then returned to the front stairs and ascended, all with SBA and verbal cues for proper sequencing. Pt was very pleased and encouraged that he was able to accomplish the stairs to the second floor today.    R: Pt demonstrating improved stair navigation and requires continued PT for further strengthening, gait and balance training to improve functional mobility and independence.    Plan for next visit: Progress stair and gait training

## 2024-08-14 ENCOUNTER — HOME CARE VISIT (OUTPATIENT)
Dept: HOME HEALTH SERVICES | Facility: HOME HEALTH | Age: 69
End: 2024-08-14
Payer: MEDICARE

## 2024-08-14 VITALS
RESPIRATION RATE: 16 BRPM | HEART RATE: 55 BPM | SYSTOLIC BLOOD PRESSURE: 116 MMHG | TEMPERATURE: 97.3 F | DIASTOLIC BLOOD PRESSURE: 60 MMHG

## 2024-08-14 PROCEDURE — G0151 HHCP-SERV OF PT,EA 15 MIN: HCPCS | Mod: HHH

## 2024-08-14 NOTE — HOME HEALTH
Homebound status: Patient is homebound due to difficulty navigating stairs to leave the home    S: PT follow up visit. Pt being seen for decreased functional mobility related to R acetabular fx without surgical intervention.    B: Pt reports he has been using the walker more to get around the house and not using the transport chair. He reports no falls, no change in medications, and rates current pain level at 3/10.    A: Pt performed stair navigation training up and down flight to second floor with 2 hand support and SBA demonstrating appropriate step to pattern, then out of the house through the front door. Performed gait training down driveway and sidewalk 2 houses down and back, then back into the house. Pt demonstrating improved overall technique with increased L stride length and improved stability, but still requires occasional reminders to lock R knee straight during stance phase. Performed sit to stand transfer training from lower couch using wheeled walker and arm of couch for support 3x with good return demo. Pt instructed to perform this as an exercise 5-10 times straight for HEP.    R: Pt demonstrating improved gait and transfers and requires continued PT for further strengthening, gait and balance training to improve functional mobility and independence.    Plan for next visit: Progress stair training with pt's wife assisting

## 2024-08-19 ENCOUNTER — LAB (OUTPATIENT)
Dept: LAB | Facility: LAB | Age: 69
End: 2024-08-19
Payer: MEDICARE

## 2024-08-19 DIAGNOSIS — N39.0 URINARY TRACT INFECTION WITHOUT HEMATURIA, SITE UNSPECIFIED: Primary | ICD-10-CM

## 2024-08-19 DIAGNOSIS — N39.0 URINARY TRACT INFECTION WITHOUT HEMATURIA, SITE UNSPECIFIED: ICD-10-CM

## 2024-08-19 NOTE — PROGRESS NOTES
Patient is home recovering from fx pelvis;  per visiting nurse may have UTI;   will order urine and culture

## 2024-08-20 ENCOUNTER — HOME CARE VISIT (OUTPATIENT)
Dept: HOME HEALTH SERVICES | Facility: HOME HEALTH | Age: 69
End: 2024-08-20
Payer: MEDICARE

## 2024-08-20 VITALS
SYSTOLIC BLOOD PRESSURE: 140 MMHG | DIASTOLIC BLOOD PRESSURE: 60 MMHG | RESPIRATION RATE: 16 BRPM | HEART RATE: 56 BPM | TEMPERATURE: 98.5 F

## 2024-08-20 PROCEDURE — G0151 HHCP-SERV OF PT,EA 15 MIN: HCPCS | Mod: HHH

## 2024-08-20 NOTE — HOME HEALTH
Homebound status: Patient is homebound due to difficulty navigating stairs to leave the home    S: PT follow up visit. Pt being seen for decreased functional mobility related to R acetabular fx without surgical intervention.    B: Pt reports he is getting frustrated with not being able to progress more. He reports no falls, no change in medications, and rates current pain level at 3/10.    A: Pt performed stair and gait training outside down and up the front stairs 3 times and up and down the stairs to the second level of the house 1x using 2 hand support with supervision assist demonstrating proper step to pattern. Pt re-instructed in step ups onto bottom stair using L LE with 2 hand support to perform with HEP for L LE strengtthening and to reinforce the proper pattern for performing stairs. Pt with good return demo and good understanding.    R: Pt demonstrating improved stairs and requires continued PT for further strengthening, gait and balance training to improve functional mobility and independence.    Plan for next visit: reassess for extension

## 2024-08-22 ENCOUNTER — HOME CARE VISIT (OUTPATIENT)
Dept: HOME HEALTH SERVICES | Facility: HOME HEALTH | Age: 69
End: 2024-08-22
Payer: MEDICARE

## 2024-08-22 ENCOUNTER — LAB (OUTPATIENT)
Dept: LAB | Facility: LAB | Age: 69
End: 2024-08-22
Payer: MEDICARE

## 2024-08-22 VITALS
TEMPERATURE: 97.9 F | SYSTOLIC BLOOD PRESSURE: 120 MMHG | RESPIRATION RATE: 16 BRPM | DIASTOLIC BLOOD PRESSURE: 64 MMHG | HEART RATE: 58 BPM

## 2024-08-22 DIAGNOSIS — N39.0 URINARY TRACT INFECTION WITHOUT HEMATURIA, SITE UNSPECIFIED: ICD-10-CM

## 2024-08-22 PROCEDURE — G0151 HHCP-SERV OF PT,EA 15 MIN: HCPCS | Mod: HHH

## 2024-08-22 PROCEDURE — 87086 URINE CULTURE/COLONY COUNT: CPT

## 2024-08-22 ASSESSMENT — GAIT ASSESSMENTS
TRUNK: 0 - MARKED SWAY OR USES WALKING AID
STEP CONTINUITY: 0 - STOPPING OR DISCONTINUITY BETWEEN STEPS
INITIATION OF GAIT IMMEDIATELY AFTER GO: 0 - ANY HESITANCY OR MULTIPLE ATTEMPTS TO START
TRUNK SCORE: 0
STEP SYMMETRY: 0 - RIGHT AND LEFT STEP LENGTH NOT EQUAL
BALANCE AND GAIT SCORE: 14
WALKING STANCE: 1 - HEELS ALMOST TOUCHING WHILE WALKING
PATH: 1 - MILD/MODERATE DEVIATION OR USES WALKING AID
PATH SCORE: 1
GAIT SCORE: 5

## 2024-08-22 ASSESSMENT — ENCOUNTER SYMPTOMS
HIGHEST PAIN SEVERITY IN PAST 24 HOURS: 5/10
PAIN LOCATION: RIGHT LEG
PAIN LOCATION - PAIN SEVERITY: 4/10
SUBJECTIVE PAIN PROGRESSION: WAXING AND WANING
LOWEST PAIN SEVERITY IN PAST 24 HOURS: 3/10
PERSON REPORTING PAIN: PATIENT
PAIN: 1

## 2024-08-22 ASSESSMENT — BALANCE ASSESSMENTS
IMMEDIATE STANDING BALANCE FIRST 5 SECONDS: 1 - STEADY BUT USES WALKER OR OTHER SUPPORT
ARISES: 1 - ABLE, USES ARMS TO HELP
SITTING BALANCE: 1 - STEADY, SAFE
ATTEMPTS TO ARISE: 2 - ABLE TO RISE, ONE ATTEMPT
ARISING SCORE: 1
TURNING 360 DEGREES STEPS: 0 - DISCONTINUOUS STEPS
NUDGED SCORE: 2
SITTING DOWN: 1 - USES ARMS OR NOT SMOOTH MOTION
NUDGED: 2 - STEADY
EYES CLOSED AT MAXIMUM POSITION NUDGED: 0 - UNSTEADY
BALANCE SCORE: 9
STANDING BALANCE: 1 - STEADY BUT WIDE STANCE AND USES CANE OR OTHER SUPPORT

## 2024-08-22 NOTE — HOME HEALTH
S: PT follow up visit for reassessment. Pt being seen for weakness and impaired mobility related to R acetabular fx without surgical intervention.    B: Pt reports he has been practicing the step ups and working on the HEP. His R LE is a little more sore today. He reports no falls, no change in medications, and rates current pain level at 4/10.    A: Pt presents with improved gait and balance as illustrated by improved Tinetti score of 14/28 today. Pt ambulates using wheeled walker with supervision assist demonstrating decreased R stance phase time, decreased R knee extension in stance phase, decreased L stride length, inconsistent rhythm and instability. Pt instructed in stair navigation training using 1 railing and 1 cane up and down 12 stairs with SBA demonstrating appropriate step to pattern, with pt reminding himself of which LE to lead with each step. R hamstring presents with increased tone since reducing the baclofen, but he is able to attain full R knee extension after stretching for 30 seconds. Pt instructed to keep R foot elevated on something when he is seated to promote knee extension. Pt does not follow up with orthopedist for 2 more weeks, and requires continued PT for gait and stair training until then, and possbily beyond pending results of the follow up visit.     R: Pt has demonstrated good progress thus far and requires continued PT for further strengthening, gait and balance training to improve functional mobility and independence.

## 2024-08-22 NOTE — Clinical Note
Hello, I did a reassessment on Nasir today for home health PT. He is making good progress within his weight bearing limitations. He does not follow up with Dr Kerr until 9/9/24, so we would like to continue 1x weekly until then. They will be determining whether or not he needs surgery at that time, or if he can continue with the conservative route and hopefully increase his weight bearing to as tolerated then, and since the outpatient clinics are booked out for 4-6 weeks these days, I am guessing we might need to continue into a new certification period to continue with gait training at that time, if this is ok with you? He still does not have another PCP set up, I guess.

## 2024-08-23 LAB — BACTERIA UR CULT: ABNORMAL

## 2024-08-28 ENCOUNTER — LAB (OUTPATIENT)
Dept: LAB | Facility: LAB | Age: 69
End: 2024-08-28
Payer: MEDICARE

## 2024-08-28 ENCOUNTER — DOCUMENTATION (OUTPATIENT)
Dept: PULMONOLOGY | Facility: HOSPITAL | Age: 69
End: 2024-08-28

## 2024-08-28 ENCOUNTER — HOME CARE VISIT (OUTPATIENT)
Dept: HOME HEALTH SERVICES | Facility: HOME HEALTH | Age: 69
End: 2024-08-28
Payer: MEDICARE

## 2024-08-28 ENCOUNTER — TELEPHONE (OUTPATIENT)
Dept: ORTHOPEDIC SURGERY | Facility: HOSPITAL | Age: 69
End: 2024-08-28

## 2024-08-28 ENCOUNTER — OFFICE VISIT (OUTPATIENT)
Dept: PULMONOLOGY | Facility: CLINIC | Age: 69
End: 2024-08-28
Payer: MEDICARE

## 2024-08-28 VITALS
HEART RATE: 62 BPM | SYSTOLIC BLOOD PRESSURE: 108 MMHG | RESPIRATION RATE: 16 BRPM | DIASTOLIC BLOOD PRESSURE: 58 MMHG | TEMPERATURE: 97.7 F

## 2024-08-28 VITALS
OXYGEN SATURATION: 99 % | DIASTOLIC BLOOD PRESSURE: 58 MMHG | RESPIRATION RATE: 18 BRPM | SYSTOLIC BLOOD PRESSURE: 151 MMHG | TEMPERATURE: 97.5 F | HEART RATE: 55 BPM

## 2024-08-28 DIAGNOSIS — R63.4 WEIGHT LOSS: ICD-10-CM

## 2024-08-28 DIAGNOSIS — R35.0 URINARY FREQUENCY: ICD-10-CM

## 2024-08-28 DIAGNOSIS — I10 ACCELERATED HYPERTENSION: ICD-10-CM

## 2024-08-28 DIAGNOSIS — R35.0 URINARY FREQUENCY: Primary | ICD-10-CM

## 2024-08-28 LAB
APPEARANCE UR: CLEAR
BILIRUB UR STRIP.AUTO-MCNC: NEGATIVE MG/DL
COLOR UR: ABNORMAL
CREAT SERPL-MCNC: 1.18 MG/DL (ref 0.5–1.3)
EGFRCR SERPLBLD CKD-EPI 2021: 66 ML/MIN/1.73M*2
GLUCOSE UR STRIP.AUTO-MCNC: NORMAL MG/DL
KETONES UR STRIP.AUTO-MCNC: NEGATIVE MG/DL
LEUKOCYTE ESTERASE UR QL STRIP.AUTO: ABNORMAL
MUCOUS THREADS #/AREA URNS AUTO: ABNORMAL /LPF
NITRITE UR QL STRIP.AUTO: NEGATIVE
PH UR STRIP.AUTO: 5.5 [PH]
PROT UR STRIP.AUTO-MCNC: NEGATIVE MG/DL
RBC # UR STRIP.AUTO: NEGATIVE /UL
RBC #/AREA URNS AUTO: ABNORMAL /HPF
SP GR UR STRIP.AUTO: 1.02
URATE CRY #/AREA UR COMP ASSIST: ABNORMAL /HPF
UROBILINOGEN UR STRIP.AUTO-MCNC: NORMAL MG/DL
WBC #/AREA URNS AUTO: ABNORMAL /HPF

## 2024-08-28 PROCEDURE — 87086 URINE CULTURE/COLONY COUNT: CPT

## 2024-08-28 PROCEDURE — 1160F RVW MEDS BY RX/DR IN RCRD: CPT | Performed by: INTERNAL MEDICINE

## 2024-08-28 PROCEDURE — 1036F TOBACCO NON-USER: CPT | Performed by: INTERNAL MEDICINE

## 2024-08-28 PROCEDURE — 3077F SYST BP >= 140 MM HG: CPT | Performed by: INTERNAL MEDICINE

## 2024-08-28 PROCEDURE — 36415 COLL VENOUS BLD VENIPUNCTURE: CPT

## 2024-08-28 PROCEDURE — 87186 SC STD MICRODIL/AGAR DIL: CPT

## 2024-08-28 PROCEDURE — 82565 ASSAY OF CREATININE: CPT

## 2024-08-28 PROCEDURE — 99214 OFFICE O/P EST MOD 30 MIN: CPT | Performed by: INTERNAL MEDICINE

## 2024-08-28 PROCEDURE — 1159F MED LIST DOCD IN RCRD: CPT | Performed by: INTERNAL MEDICINE

## 2024-08-28 PROCEDURE — 3078F DIAST BP <80 MM HG: CPT | Performed by: INTERNAL MEDICINE

## 2024-08-28 PROCEDURE — G0151 HHCP-SERV OF PT,EA 15 MIN: HCPCS | Mod: HHH

## 2024-08-28 PROCEDURE — 81001 URINALYSIS AUTO W/SCOPE: CPT

## 2024-08-28 RX ORDER — AMLODIPINE BESYLATE 10 MG/1
TABLET ORAL
Qty: 90 TABLET | Refills: 3 | Status: SHIPPED | OUTPATIENT
Start: 2024-08-28

## 2024-08-28 ASSESSMENT — ENCOUNTER SYMPTOMS
FREQUENCY: 1
ARTHRALGIAS: 0
WEAKNESS: 0
DIFFICULTY URINATING: 0
SPEECH DIFFICULTY: 0
DIZZINESS: 0
ABDOMINAL DISTENTION: 0
FATIGUE: 0
NUMBNESS: 0
UNEXPECTED WEIGHT CHANGE: 0
RHINORRHEA: 0
HEADACHES: 0
FACIAL SWELLING: 0
ABDOMINAL PAIN: 0
DYSURIA: 0
SINUS PAIN: 0
APNEA: 0
WHEEZING: 0
OCCASIONAL FEELINGS OF UNSTEADINESS: 1
EYE REDNESS: 0
SLEEP DISTURBANCE: 0
LIGHT-HEADEDNESS: 0
PALPITATIONS: 0
TREMORS: 0
BRUISES/BLEEDS EASILY: 0
AGITATION: 0
SINUS PRESSURE: 0
COUGH: 0
STRIDOR: 0
JOINT SWELLING: 0
SHORTNESS OF BREATH: 0
ADENOPATHY: 0
EYE DISCHARGE: 0
FEVER: 0
HEMATURIA: 0
CHOKING: 0
LOSS OF SENSATION IN FEET: 0
NERVOUS/ANXIOUS: 0
CONSTIPATION: 0
NAUSEA: 0
DEPRESSION: 1

## 2024-08-28 NOTE — PROGRESS NOTES
@PULMONARY FOLLOW-UP@       PROBLEM: hip and pelvic fracture     ASSESSMENT:  The patient is a 69-year-old with Graves' disease, hyperthyroidism, history of a prior stroke with LAD and a myocardial infarction in the past who is BRCA positive.  He has undergone a TAVR procedure for aortic stenosis and is recovering from a traumatic fracture of his right hip and acetabulum.  He probably will need reconstruction.  He has lost around 20 pounds and has urinary frequency.  The Vesicare has not improved the situation.  He does have upcoming cystoscopy and has upcoming appointment with orthopedics to discuss reconstruction.  The weight loss probably relates to decreased eating during his recovery period from the hip fracture.  His blood pressure has gone back up.  Previously it was low and his amlodipine was stopped.  He probably asked to restart at a lower dose.    PLAN:  He will restart the amlodipine 5 mg today.  Will get a urine culture and urinalysis.  Because of the weight loss and the history of BRCA positivity we will get a CT scan of the abdomen and pelvis and chest.  He does have a history of a prior pulmonary nodule which has been stable.    He also was told he needs to find a primary care physician to follow his multiple health issues    He is following up with ortho as well as urology    Concerned  about prescribing muscle relaxers because they caused confusion in him as did  gabapentin      HISTORY OF PRESENT ILLNESS:  The patient is a 69-year-old with a history of Graves' disease and hyperthyroidism, hemorrhagic stroke with right hemiparesis in 2016, stable pulmonary nodule, history of CAD with a prior myocardial infarction 1999 with acute plaque rupture in the LAD. Also, he is BRACA positive. He also has hypertension hyperlipidemia with aortic stenosis undergoing a TAVR on June 29, 2021. He subsequently presented on July 18, 2021 with 3 days of headache and nausea, body aches and low-grade fever. He was  determined to have an intraventricular bleed and was admitted to the neurosurgical intensive care unit for observation. His Plavix was held and he was stable after 2 days and was discharged on July 20, 2021. Around 3 days later he was having increasing and persistent headache and a repeat CT scan was obtained on July 23, 2021 which revealed decreasing intraventricular blood. At that point it was felt that he was stable to resume his Plavix.  It was noted on September 23, 2022 he had COVID and was treated with Paxlovid.    He has also noted that the TAVR CT scan from 8/12/2021 revealed pulm nodularity unchanged compared to 2017.     On April 1, 2024 the patient reported that he has not been seen in follow up since his TAVR several years ago.  He has been concerned about COVID and hence hasn't gone to clinicians.  He had a slight tickle in his throat about a week ago but has not come down with any infection.  He has no chest pains or pressures PND orthopnea.  He has no swelling of his ankles.  His weight is stable.  He has some decrease in hearing and was concerned about that.  He has not had any labs for a long time and he has not had a colonoscopy for 10 years.  He did have COVID about a year and a half ago.  He had a pulmonary nodule and on his last CT scan from 2021 it was noted that he had not changed compared to 2017 and no further scanning was needed.  He has had an elevated PSA in the past.    Subsequently on June 23, 2024 he had a traumatic fall fracturing his pelvis and right acetabulum.  He was hospitalized for period time and then went to rehab.  He has been followed by home care now and is being followed by Dr. Kerr and it was noted that his hip joint was incongruent secondary to the fracture and displacement.  It was felt that 1 option was ORIF and acute total hip replacement and the other option was closed treatment and delayed reconstruction in the future.  The latter was chosen.  At home he has  had problems with low blood pressure at times urinary frequency etc.  His blood pressure medications have been adjusted.  He has made progress with ambulation with a wheeled walker    The patient reports that he is making some progress ambulating with a walker.  He has chronic pain.  He has lost a significant amount of weight probably around 20 pounds.  He does have BRCA positivity.  He has no coughing or congestion abdominal pain.  He has continued urinary frequency.  He apparently is having a cystoscopy soon.  He is also going to see Dr. Kerr in the near future for consideration of reconstruction of his hip      Allergies   Allergen Reactions    Penicillin Unknown            Current Outpatient Medications:     acetaminophen (Tylenol) 325 mg tablet, Take 2 tablets (650 mg) by mouth every 6 hours if needed for mild pain (1 - 3) or moderate pain (4 - 6)., Disp: , Rfl:     aspirin 81 mg chewable tablet, Chew 1 tablet (81 mg) once daily., Disp: , Rfl:     atorvastatin (Lipitor) 40 mg tablet, Take 1 tablet (40 mg) by mouth once daily., Disp: 90 tablet, Rfl: 3    carvedilol (Coreg) 3.125 mg tablet, TAKE 1 TABLET BY MOUTH TWICE A DAY WITH FOOD, Disp: 180 tablet, Rfl: 3    cholecalciferol (Vitamin D-3) 25 MCG (1000 UT) tablet, Take 1 tablet (1,000 Units) by mouth once daily., Disp: , Rfl:     citalopram (CeleXA) 20 mg tablet, TAKE 1 TABLET BY MOUTH EVERY DAY, Disp: 90 tablet, Rfl: 3    cyclobenzaprine (Flexeril) 5 mg tablet, Take 1 tablet (5 mg) by mouth 3 times a day., Disp: , Rfl:     losartan (Cozaar) 50 mg tablet, TAKE 1 TABLET BY MOUTH EVERY DAY, Disp: 90 tablet, Rfl: 3    mv-min-folic-K1-lycopen-lutein (Centrum Silver Men) 408--300 mcg tablet, Take 1 tablet by mouth once daily., Disp: , Rfl:     polyethylene glycol (Glycolax, Miralax) 17 gram packet, Take 17 g by mouth once daily as needed (constipation)., Disp: , Rfl:     solifenacin (VESIcare) 5 mg tablet, Take 1 tablet (5 mg) by mouth once daily.  Swallow tablet whole; do not crush, chew, or split., Disp: 30 tablet, Rfl: 11    amLODIPine (Norvasc) 10 mg tablet, 1/2 tablet or 5 mg every day, Disp: 90 tablet, Rfl: 3    baclofen (Lioresal) 5 mg tablet, Take 1 tablet (5 mg) by mouth 3 times a day., Disp: , Rfl:     tamsulosin (Flomax) 0.4 mg 24 hr capsule, Take 1 capsule (0.4 mg) by mouth once daily., Disp: , Rfl:           Review of Systems   Constitutional:  Negative for fatigue, fever and unexpected weight change.   HENT:  Negative for congestion, facial swelling, nosebleeds, postnasal drip, rhinorrhea, sinus pressure and sinus pain.    Eyes:  Negative for discharge, redness and visual disturbance.   Respiratory:  Negative for apnea, cough, choking, shortness of breath, wheezing and stridor.    Cardiovascular:  Negative for chest pain, palpitations and leg swelling.   Gastrointestinal:  Negative for abdominal distention, abdominal pain, constipation and nausea.   Endocrine: Negative for cold intolerance and heat intolerance.   Genitourinary:  Positive for frequency. Negative for difficulty urinating, dysuria and hematuria.   Musculoskeletal:  Negative for arthralgias, gait problem and joint swelling.   Allergic/Immunologic: Negative for environmental allergies, food allergies and immunocompromised state.   Neurological:  Negative for dizziness, tremors, syncope, speech difficulty, weakness, light-headedness, numbness and headaches.   Hematological:  Negative for adenopathy. Does not bruise/bleed easily.   Psychiatric/Behavioral:  Negative for agitation, behavioral problems and sleep disturbance. The patient is not nervous/anxious.         Vitals:    08/28/24 0900   BP: 151/58   Pulse: 55   Resp: 18   Temp: 36.4 °C (97.5 °F)   SpO2: 99%        Physical Exam  Vitals reviewed.   Constitutional:       Appearance: Normal appearance.      Comments: Using a walker; thinner than when previously seen   HENT:      Head: Normocephalic and atraumatic.   Eyes:       Extraocular Movements: Extraocular movements intact.   Cardiovascular:      Rate and Rhythm: Normal rate and regular rhythm.      Heart sounds: No murmur heard.     No friction rub. No gallop.   Pulmonary:      Effort: Pulmonary effort is normal. No respiratory distress.      Breath sounds: Normal breath sounds. No stridor. No wheezing, rhonchi or rales.   Chest:      Chest wall: No tenderness.   Abdominal:      General: Abdomen is flat. There is no distension.      Palpations: Abdomen is soft. There is no mass.      Tenderness: There is no abdominal tenderness.   Musculoskeletal:         General: Normal range of motion.      Cervical back: Normal range of motion.      Right lower leg: No edema.      Left lower leg: No edema.   Skin:     General: Skin is warm and dry.   Neurological:      Mental Status: He is alert and oriented to person, place, and time.   Psychiatric:         Mood and Affect: Mood normal.         Behavior: Behavior normal.

## 2024-08-28 NOTE — PATIENT INSTRUCTIONS
I would start back the amlodipine at 5 mg a day (half a tablet)    Will obtain a urinalysis and urine culture    Will get ct of abdomen and pelvis; chest with weight loss

## 2024-08-28 NOTE — PROGRESS NOTES
From a general medical perspective the patient appears stable for upcoming hip reconstruction.  His blood pressure was somewhat elevated today and his amlodipine was restarted at 5 mg a day.  While neurologically he appears stable, if formal neurology clearance is needed for upcoming hip reconstruction, then Dr. Mccormick who has been following him from a stroke perspective should be consulted.  Dr Carroll Lai is his cardiologist and the patient will follow-up with him prior to any surgery.

## 2024-08-28 NOTE — TELEPHONE ENCOUNTER
Patient called and LM on office VM today and Monday 08.26.24    Patient is requesting to be scheduled for surgery now ahead of his upcoming appt scheduled on 09/09 for a FUV from a non-op pelvic fracture he had sustained on 06.23.24 to discuss a DA SURI.     This nurse discussed with patient and his wife that he will have to wait the full 6 weeks for Dr. Kerr to be able to see healing or not on his xrays. Also, told him that he will need to have PAT prior to surgery. It was also discussed that the patient will need to follow-up with his cardiologist d/t missing his ECHO and US while he was admitted to the hospital in June. Also, suggested to the patient and his wife that d/t his h/o NSTEMI he should see his PCP to see if he would suggest the patient be eval'd by a neurologist or able to clear him from that stand point.     Wife and Patient agreed to call cardiology to set up an appt and to call PCP in regards to neuro clearance.     No further questions at this time. Notified to call the office with any further questions    Radha Marquis LPN

## 2024-08-29 ENCOUNTER — TELEPHONE (OUTPATIENT)
Dept: SCHEDULING | Age: 69
End: 2024-08-29
Payer: MEDICARE

## 2024-08-29 NOTE — HOME HEALTH
Homebound status: Patient is homebound due to difficulty navigating stairs to leave the home    S: PT follow up visit. Pt being seen for decreased functional mobility related to fall with R acetabular fx.    B: Pt reports he has stopped the muscle relaxer altogether, and his doctor resumed amlodipine today due to high BP readings of late. He reports no falls and rates current pain level at 3.5/10.    A: Pt performed stair navigation training up to second floor using 1 railing and 1 cane up and down with supervision assist demonstrating appropriate step to pattern.    R: Pt demonstrating improved stair navigation and requires continued PT for further strengthening, gait and balance training to improve functional mobility and independence.    Plan for next visit: Reassess for recertification

## 2024-08-30 ENCOUNTER — DOCUMENTATION (OUTPATIENT)
Dept: PULMONOLOGY | Facility: HOSPITAL | Age: 69
End: 2024-08-30
Payer: MEDICARE

## 2024-08-30 DIAGNOSIS — N30.00 ACUTE CYSTITIS WITHOUT HEMATURIA: Primary | ICD-10-CM

## 2024-08-30 RX ORDER — NITROFURANTOIN (MACROCRYSTALS) 100 MG/1
CAPSULE ORAL
Qty: 28 CAPSULE | Refills: 0 | Status: SHIPPED | OUTPATIENT
Start: 2024-08-30

## 2024-08-30 NOTE — PROGRESS NOTES
Patient is growing 20,000 - 80,000 Enterococcus faecalis, he is allergic to penicillin.  The bacteria should be sensitive to Macrodantin and for bladder infection that should work.

## 2024-08-31 LAB — BACTERIA UR CULT: ABNORMAL

## 2024-09-04 ENCOUNTER — HOSPITAL ENCOUNTER (OUTPATIENT)
Dept: CARDIOLOGY | Facility: CLINIC | Age: 69
Discharge: HOME | End: 2024-09-04
Payer: MEDICARE

## 2024-09-04 ENCOUNTER — HOSPITAL ENCOUNTER (OUTPATIENT)
Dept: RADIOLOGY | Facility: CLINIC | Age: 69
Discharge: HOME | End: 2024-09-04
Payer: MEDICARE

## 2024-09-04 DIAGNOSIS — Z95.2 S/P TAVR (TRANSCATHETER AORTIC VALVE REPLACEMENT): ICD-10-CM

## 2024-09-04 DIAGNOSIS — I25.2 HISTORY OF NON-ST ELEVATION MYOCARDIAL INFARCTION (NSTEMI): ICD-10-CM

## 2024-09-04 DIAGNOSIS — I10 ESSENTIAL HYPERTENSION: ICD-10-CM

## 2024-09-04 PROCEDURE — 93880 EXTRACRANIAL BILAT STUDY: CPT | Performed by: RADIOLOGY

## 2024-09-04 PROCEDURE — 93880 EXTRACRANIAL BILAT STUDY: CPT

## 2024-09-04 PROCEDURE — 93306 TTE W/DOPPLER COMPLETE: CPT | Performed by: INTERNAL MEDICINE

## 2024-09-04 PROCEDURE — 93306 TTE W/DOPPLER COMPLETE: CPT

## 2024-09-05 ENCOUNTER — DOCUMENTATION (OUTPATIENT)
Dept: PULMONOLOGY | Facility: HOSPITAL | Age: 69
End: 2024-09-05
Payer: MEDICARE

## 2024-09-05 ENCOUNTER — HOME CARE VISIT (OUTPATIENT)
Dept: HOME HEALTH SERVICES | Facility: HOME HEALTH | Age: 69
End: 2024-09-05
Payer: MEDICARE

## 2024-09-05 VITALS
SYSTOLIC BLOOD PRESSURE: 116 MMHG | RESPIRATION RATE: 16 BRPM | TEMPERATURE: 98.5 F | HEART RATE: 60 BPM | DIASTOLIC BLOOD PRESSURE: 52 MMHG

## 2024-09-05 DIAGNOSIS — R91.1 LUNG NODULE: ICD-10-CM

## 2024-09-05 DIAGNOSIS — Z15.09 BRCA GENE POSITIVE: ICD-10-CM

## 2024-09-05 DIAGNOSIS — R63.4 WEIGHT LOSS: Primary | ICD-10-CM

## 2024-09-05 DIAGNOSIS — Z15.01 BRCA GENE POSITIVE: ICD-10-CM

## 2024-09-05 LAB
AORTIC VALVE MEAN GRADIENT: 7.9 MMHG
AORTIC VALVE PEAK VELOCITY: 1.9 M/S
AV PEAK GRADIENT: 14.4 MMHG
AVA (PEAK VEL): 2.29 CM2
AVA (VTI): 2.14 CM2
EJECTION FRACTION APICAL 4 CHAMBER: 66.2
EJECTION FRACTION: 64 %
LEFT ATRIUM VOLUME AREA LENGTH INDEX BSA: 47.3 ML/M2
LEFT VENTRICLE INTERNAL DIMENSION DIASTOLE: 4.2 CM (ref 3.5–6)
LEFT VENTRICULAR OUTFLOW TRACT DIAMETER: 2.08 CM
MITRAL VALVE E/A RATIO: 1.28
RIGHT VENTRICLE FREE WALL PEAK S': 13.4 CM/S
TRICUSPID ANNULAR PLANE SYSTOLIC EXCURSION: 2.6 CM

## 2024-09-05 PROCEDURE — G0151 HHCP-SERV OF PT,EA 15 MIN: HCPCS | Mod: HHH

## 2024-09-05 ASSESSMENT — GAIT ASSESSMENTS
WALKING STANCE: 0 - HEELS APART
TRUNK SCORE: 0
STEP SYMMETRY: 0 - RIGHT AND LEFT STEP LENGTH NOT EQUAL
TRUNK: 0 - MARKED SWAY OR USES WALKING AID
GAIT SCORE: 4
PATH: 1 - MILD/MODERATE DEVIATION OR USES WALKING AID
PATH SCORE: 1
INITIATION OF GAIT IMMEDIATELY AFTER GO: 0 - ANY HESITANCY OR MULTIPLE ATTEMPTS TO START
STEP CONTINUITY: 0 - STOPPING OR DISCONTINUITY BETWEEN STEPS
BALANCE AND GAIT SCORE: 12

## 2024-09-05 ASSESSMENT — BALANCE ASSESSMENTS
STANDING BALANCE: 1 - STEADY BUT WIDE STANCE AND USES CANE OR OTHER SUPPORT
ARISES: 1 - ABLE, USES ARMS TO HELP
IMMEDIATE STANDING BALANCE FIRST 5 SECONDS: 1 - STEADY BUT USES WALKER OR OTHER SUPPORT
ARISING SCORE: 1
EYES CLOSED AT MAXIMUM POSITION NUDGED: 0 - UNSTEADY
BALANCE SCORE: 8
TURNING 360 DEGREES STEPS: 0 - DISCONTINUOUS STEPS
SITTING BALANCE: 1 - STEADY, SAFE
NUDGED SCORE: 2
ATTEMPTS TO ARISE: 1 - ABLE, REQUIRES MORE THAN ONE ATTEMPT
SITTING DOWN: 1 - USES ARMS OR NOT SMOOTH MOTION
NUDGED: 2 - STEADY

## 2024-09-05 ASSESSMENT — ACTIVITIES OF DAILY LIVING (ADL)
OASIS_M1830: 05
AMBULATION ASSISTANCE ON FLAT SURFACES: 1
ENTERING_EXITING_HOME: STAND BY ASSIST

## 2024-09-05 ASSESSMENT — ENCOUNTER SYMPTOMS
PERSON REPORTING PAIN: PATIENT
PAIN LOCATION - PAIN SEVERITY: 5/10
PAIN: 1
HIGHEST PAIN SEVERITY IN PAST 24 HOURS: 5/10
LOWEST PAIN SEVERITY IN PAST 24 HOURS: 3/10
PAIN LOCATION: RIGHT HIP

## 2024-09-05 NOTE — HOME HEALTH
S: PT follow up visit for reassessment. Pt being seen for weakness and impaired mobility related to    B: Pt reports his R LE is stiff and sore today. He reports no falls, no change in medications, and rates current pain level at 5/10.    A: Pt presents with improved gait and balance as illustrated by improved gait technique, independence and endurance, and Tinetti score of 12/28 today. Pt ambulates using wheeled walker with supervision assist demonstrating shortened L stride length, decreased R stance phase time, instability and inconsistent rhythm. Pt performed stair navigation training using 1 railing and 1 cane with SBA demonstrating appropriate step to pattern. PT performed R hamstring stretch and placed pt's R foot on table in front of his chair and instructed to stay in this position when sitting to reduce spasms in R hamstring. Pt has follow up with the orthopedist on Monday to determine whether or not pt will require surgery to repair the R hip. If so, pt will be discharged and will likely resume after surgery. If the doctor decides against surgery, pt will require continued PT for further gait and balance training as he is permitted to increase weight bearing.    R: Pt has demonstrated good progress thus far and requires continued PT for further strengthening, gait and balance training to improve functional mobility and independence.

## 2024-09-05 NOTE — Clinical Note
Pt has follow up with Dr Kerr on Monday to discuss whether or not he will require surgery on his R hip. If so, PT will likely discharge and resume after surgery, otherwise we will continue to work on gait and balance training as his weight bearing restrictions are lifted until he is ready to transition to outpatient PT.

## 2024-09-05 NOTE — PROGRESS NOTES
In relationship to his BRCA positivity and weight loss and ordering a CT scan of the abdomen with oral contrast and a CT scan of the chest without contrast

## 2024-09-09 ENCOUNTER — HOSPITAL ENCOUNTER (OUTPATIENT)
Dept: RADIOLOGY | Facility: CLINIC | Age: 69
Discharge: HOME | End: 2024-09-09
Payer: MEDICARE

## 2024-09-09 ENCOUNTER — APPOINTMENT (OUTPATIENT)
Dept: RADIOLOGY | Facility: HOSPITAL | Age: 69
End: 2024-09-09
Payer: MEDICARE

## 2024-09-09 ENCOUNTER — OFFICE VISIT (OUTPATIENT)
Dept: ORTHOPEDIC SURGERY | Facility: CLINIC | Age: 69
End: 2024-09-09
Payer: MEDICARE

## 2024-09-09 DIAGNOSIS — Z96.9 PRESENCE OF RETAINED HARDWARE: ICD-10-CM

## 2024-09-09 DIAGNOSIS — M16.51 POST-TRAUMATIC OSTEOARTHRITIS OF RIGHT HIP: Primary | ICD-10-CM

## 2024-09-09 DIAGNOSIS — S32.461A CLOSED DISPLACED COMBINED TRANSVERSE-POSTERIOR FRACTURE OF RIGHT ACETABULUM, INITIAL ENCOUNTER (MULTI): ICD-10-CM

## 2024-09-09 PROCEDURE — 72190 X-RAY EXAM OF PELVIS: CPT | Performed by: RADIOLOGY

## 2024-09-09 PROCEDURE — 72190 X-RAY EXAM OF PELVIS: CPT

## 2024-09-09 PROCEDURE — 99211 OFF/OP EST MAY X REQ PHY/QHP: CPT | Performed by: ORTHOPAEDIC SURGERY

## 2024-09-09 RX ORDER — SODIUM CHLORIDE, SODIUM LACTATE, POTASSIUM CHLORIDE, CALCIUM CHLORIDE 600; 310; 30; 20 MG/100ML; MG/100ML; MG/100ML; MG/100ML
20 INJECTION, SOLUTION INTRAVENOUS CONTINUOUS
OUTPATIENT
Start: 2024-09-09

## 2024-09-09 NOTE — PROGRESS NOTES
ORTHOPAEDIC HISTORY AND PHYSICAL    History Of Present Illness  Orthopaedic Problems/Injuries:  Nasir Camacho is a 69 y.o. male presenting with his wife today for right hip injury.  Patient sustained a fall and was seen [facility][note 3].  His fracture happened in June.  Patient has been nonweightbearing.  We discussed treatment option the last time I saw him.  He elected to try conservative treatment.  Patient has been participating physical therapy at home.  He reports that he is functionally very limited.  He is doing partial weightbearing with crutches.  He has significant pain in the groin area.  He feels like the hip is unstable.  There is also limited range of motion of the hip.       Physical Exam  The patient is well-nourished and well-developed and in no acute distress. The patient displayed normal mood and affect. The patient's pupils were equal, round sclerae are white. Patient's respirations appear to be regular and unlabored.    Patient was seen and ambulated with a walker.  Examination of the hip reveals no skin abnormalities.  No tenderness to palpation about the hip, knee and thigh area.  Range of motion of the hip reveals flexion past 90 degrees, patient has full extension, 40 degrees of hip abduction, 30 degrees of abduction, internal rotation to 20 degrees and external rotation to 45 degrees.  There is severe pain with rotational range of motion of the hip.          Relevant Results  I personally reviewed his right hip radiograph that reveals T shaped type acetabular fracture with medial subluxation of the head and marginal impaction.  There is early callus formation however there is no bridging bone and a fracture gap still present.      Assessment/Plan   Patient with displaced right acetabular fracture with incongruity of the hip joint.  Patient has severe hip arthrosis and incomplete fracture healing.  Patient reports that he is functionally very limited and is having severe pain in the  hip.  At this point he would like to proceed with hip replacement surgery as soon as possible given the fact he has severe hip arthrosis and would likely need arthroplasty in the future.  I discussed with him that given lack of fracture healing we will need to stabilize his columns of his acetabular prior to proceed with hip replacement surgery.  This can be done as 1 stage.  Patient has a pre-existing retrograde nail will need to have this removed in order to accommodate hip prosthesis.  Patient has complex medical history he will follow-up with his cardiologist and primary care physician for preoperative evaluation and clearance.  He was also referred to our preoperative anesthesia clinic.  Patient is tentatively scheduled for September 25.  I talked with the patient at length about risks, limitations, benefits and alternatives to total hip replacement today. Under my care or the care of previous providers, the patient has had a reasonable trial of nonoperative treatment for their hip problem including NSAIDS, tylenol  or other analgesics, activity modification and activity restriction and use of assistive devices.  These  previous treatments have not provided the patient with durable relief of their symptoms.The patient is not an appropriate candidate for physical therapy at this time. Despite the above, patient has had pain and symptomatic functional impairment that either interferes with their sleep or ADLs and quality of life. I reviewed concerns about implant wear, loosening, breakage, infection and infection prophylaxis, DVT, PE, death and other medical and anesthetic complications of surgery. We talked about the potential for persistent pain following surgery since there are many possible causes for hip and leg pain. The patient was advised that hip replacement will only relieve pain that is coming from the hip. We talked about leg length discrepancy, neurovascular problems and dislocation after surgery.  The patient understands that we may have to lengthen the leg slightly to provide for adequate stability of the hip. I reviewed dislocation precautions and activity restrictions in detail. We discussed advantages and disadvantages of cemented and cementless implant fixation. We discussed the concerns about intraoperative fracture, ingrowth failure, thigh pain and possible post-operative weight bearing restrictions following cementless hip replacement. We discussed advantages and disadvantages of different surgical approaches. The basic concepts of the joint replacement procedure has been reviewed with the patient and the patient has been provided the opportunity to see an actual implant either in the office or in our pre-op education class. We discussed the possible need for a homologous blood transfusion. We discussed the fact that many of our patients are able to go home as outpatient or 1 -2 days depending on their health, mobility, pre-op preparation, individual home situation and personal preference. The patient should take our pre-operative teaching class. All of the patients questions were answered. The patient can call my office to schedule surgery and the pre-op teaching class. I told the patient that they should contact their primary care physician to discuss fitness for surgery.          Note dictated with Vap[employer] software.  Completed without full type editing and sent to avoid delay.

## 2024-09-10 ENCOUNTER — HOME CARE VISIT (OUTPATIENT)
Dept: HOME HEALTH SERVICES | Facility: HOME HEALTH | Age: 69
End: 2024-09-10
Payer: MEDICARE

## 2024-09-10 ENCOUNTER — APPOINTMENT (OUTPATIENT)
Dept: RADIOLOGY | Facility: HOSPITAL | Age: 69
End: 2024-09-10
Payer: MEDICARE

## 2024-09-10 ENCOUNTER — HOSPITAL ENCOUNTER (OUTPATIENT)
Dept: RADIOLOGY | Facility: HOSPITAL | Age: 69
Discharge: HOME | End: 2024-09-10
Payer: MEDICARE

## 2024-09-10 DIAGNOSIS — R63.4 WEIGHT LOSS: ICD-10-CM

## 2024-09-10 DIAGNOSIS — Z15.09 BRCA GENE POSITIVE: ICD-10-CM

## 2024-09-10 DIAGNOSIS — R91.1 LUNG NODULE: ICD-10-CM

## 2024-09-10 DIAGNOSIS — Z15.01 BRCA GENE POSITIVE: ICD-10-CM

## 2024-09-10 PROCEDURE — 74176 CT ABD & PELVIS W/O CONTRAST: CPT

## 2024-09-10 PROCEDURE — 2550000001 HC RX 255 CONTRASTS: Performed by: INTERNAL MEDICINE

## 2024-09-10 PROCEDURE — A9698 NON-RAD CONTRAST MATERIALNOC: HCPCS | Performed by: INTERNAL MEDICINE

## 2024-09-10 ASSESSMENT — ACTIVITIES OF DAILY LIVING (ADL)
HOME_HEALTH_OASIS: 01
OASIS_M1830: 04

## 2024-09-10 NOTE — HOME HEALTH
Pt states he will be having surgery in 2 weeks to repair his R hip and feels comfortable with his HEP and current mobility and does not need further PT until after his surgery.

## 2024-09-11 ENCOUNTER — PROCEDURE VISIT (OUTPATIENT)
Dept: UROLOGY | Facility: HOSPITAL | Age: 69
End: 2024-09-11
Payer: MEDICARE

## 2024-09-11 ENCOUNTER — CLINICAL SUPPORT (OUTPATIENT)
Dept: PREADMISSION TESTING | Facility: HOSPITAL | Age: 69
End: 2024-09-11
Payer: MEDICARE

## 2024-09-11 VITALS — SYSTOLIC BLOOD PRESSURE: 159 MMHG | DIASTOLIC BLOOD PRESSURE: 70 MMHG | HEART RATE: 80 BPM

## 2024-09-11 DIAGNOSIS — R35.0 URINARY FREQUENCY: Primary | ICD-10-CM

## 2024-09-11 DIAGNOSIS — R39.15 BENIGN PROSTATIC HYPERPLASIA (BPH) WITH URINARY URGENCY: ICD-10-CM

## 2024-09-11 DIAGNOSIS — N40.1 BENIGN PROSTATIC HYPERPLASIA (BPH) WITH URINARY URGENCY: ICD-10-CM

## 2024-09-11 LAB
POC APPEARANCE, URINE: CLEAR
POC BILIRUBIN, URINE: NEGATIVE
POC BLOOD, URINE: NEGATIVE
POC COLOR, URINE: YELLOW
POC GLUCOSE, URINE: NEGATIVE MG/DL
POC KETONES, URINE: NEGATIVE MG/DL
POC LEUKOCYTES, URINE: NEGATIVE
POC NITRITE,URINE: NEGATIVE
POC PH, URINE: 5.5 PH
POC PROTEIN, URINE: NEGATIVE MG/DL
POC SPECIFIC GRAVITY, URINE: 1.02
POC UROBILINOGEN, URINE: 0.2 EU/DL

## 2024-09-11 PROCEDURE — 99214 OFFICE O/P EST MOD 30 MIN: CPT | Performed by: UROLOGY

## 2024-09-11 PROCEDURE — 81003 URINALYSIS AUTO W/O SCOPE: CPT | Performed by: UROLOGY

## 2024-09-11 PROCEDURE — 52000 CYSTOURETHROSCOPY: CPT | Performed by: UROLOGY

## 2024-09-11 NOTE — PROGRESS NOTES
HPI    69 y.o. male being seen with the following problem list:    Problem list:  Hip fracture 6/2024  2. Bothersome urinary frequency     07/17/24 -seen in consultation.  At baseline has some bothersome urinary frequency, but since his pelvic fracture is gotten much worse.  Is now getting somewhat better.  Started on Flomax but this did not help.  PVR today 64 ml.  No gross hematuria.  He reports no gross hematuria around the time of his pelvic fracture.  No UTIs.  Is BRCA positive.  No history of prostate cancer.     09/11/24 - here for cysto. OAB symptoms are better on vesicare, but not where he wants to be.       Lab Results   Component Value Date    PSA 1.94 07/17/2024    PSA 1.91 04/01/2024    PSA 3.87 03/29/2021    PSA 1.32 06/25/2018         Current Medications:  Current Outpatient Medications   Medication Sig Dispense Refill    acetaminophen (Tylenol) 325 mg tablet Take 2 tablets (650 mg) by mouth every 6 hours if needed for mild pain (1 - 3) or moderate pain (4 - 6).      amLODIPine (Norvasc) 10 mg tablet 1/2 tablet or 5 mg every day 90 tablet 3    aspirin 81 mg chewable tablet Chew 1 tablet (81 mg) once daily.      atorvastatin (Lipitor) 40 mg tablet Take 1 tablet (40 mg) by mouth once daily. 90 tablet 3    baclofen (Lioresal) 5 mg tablet Take 1 tablet (5 mg) by mouth 3 times a day.      carvedilol (Coreg) 3.125 mg tablet TAKE 1 TABLET BY MOUTH TWICE A DAY WITH FOOD 180 tablet 3    cholecalciferol (Vitamin D-3) 25 MCG (1000 UT) tablet Take 1 tablet by mouth once daily.      citalopram (CeleXA) 20 mg tablet TAKE 1 TABLET BY MOUTH EVERY DAY 90 tablet 3    cyclobenzaprine (Flexeril) 5 mg tablet Take 1 tablet (5 mg) by mouth 3 times a day.      losartan (Cozaar) 50 mg tablet TAKE 1 TABLET BY MOUTH EVERY DAY 90 tablet 3    mv-min-folic-K1-lycopen-lutein (Centrum Silver Men) 081--300 mcg tablet Take 1 tablet by mouth once daily.      nitrofurantoin (Macrodantin) 100 mg capsule One PO bid x 7 28 capsule 0     polyethylene glycol (Glycolax, Miralax) 17 gram packet Take 17 g by mouth once daily as needed (constipation).      solifenacin (VESIcare) 5 mg tablet Take 1 tablet (5 mg) by mouth once daily. Swallow tablet whole; do not crush, chew, or split. 30 tablet 11    tamsulosin (Flomax) 0.4 mg 24 hr capsule Take 1 capsule (0.4 mg) by mouth once daily.       No current facility-administered medications for this visit.        Active Problems:  Nasir Camacho is a 69 y.o. male with the following Problems and Medications.  Patient Active Problem List   Diagnosis    Subacromial bursitis    Stroke (Multi)    Sprain of shoulder    Skin changes due to chronic exposure to nonionizing radiation, unspecified    Sensation of foreign body in eye    S/P TAVR (transcatheter aortic valve replacement)    Pulmonary nodule    Prostate disorder    Pain of right lower extremity    Other seborrheic keratosis    Inflamed seborrheic keratosis    Neuropathic pain of right lower extremity    Nephrolithiasis    Myopia with presbyopia    Myopia of both eyes    Melanocytic nevi of other parts of face    Laceration of right thumb    Hyperthyroidism    Hypercholesterolemia    Hordeolum externum of right upper eyelid    History of non-ST elevation myocardial infarction (NSTEMI)    Hemiparesis affecting dominant side as late effect of stroke (Multi)    Hematuria    Hemangioma of skin and subcutaneous tissue    Headache    Essential hypertension    Elevated PSA    Depression    MGD (meibomian gland disease)    Arteriosclerotic cardiovascular disease (ASCVD)    Accelerated hypertension    Abdominal pain    ICH (intracerebral hemorrhage) (Multi)    Fall, initial encounter    Fracture of anterior wall of acetabulum (Multi)    Urinary frequency    Screening PSA (prostate specific antigen)    Closed displaced associated transverse-posterior fracture of right acetabulum (Multi)    Post-traumatic osteoarthritis of right hip    Presence of retained hardware      Current Outpatient Medications   Medication Sig Dispense Refill    acetaminophen (Tylenol) 325 mg tablet Take 2 tablets (650 mg) by mouth every 6 hours if needed for mild pain (1 - 3) or moderate pain (4 - 6).      amLODIPine (Norvasc) 10 mg tablet 1/2 tablet or 5 mg every day 90 tablet 3    aspirin 81 mg chewable tablet Chew 1 tablet (81 mg) once daily.      atorvastatin (Lipitor) 40 mg tablet Take 1 tablet (40 mg) by mouth once daily. 90 tablet 3    baclofen (Lioresal) 5 mg tablet Take 1 tablet (5 mg) by mouth 3 times a day.      carvedilol (Coreg) 3.125 mg tablet TAKE 1 TABLET BY MOUTH TWICE A DAY WITH FOOD 180 tablet 3    cholecalciferol (Vitamin D-3) 25 MCG (1000 UT) tablet Take 1 tablet by mouth once daily.      citalopram (CeleXA) 20 mg tablet TAKE 1 TABLET BY MOUTH EVERY DAY 90 tablet 3    cyclobenzaprine (Flexeril) 5 mg tablet Take 1 tablet (5 mg) by mouth 3 times a day.      losartan (Cozaar) 50 mg tablet TAKE 1 TABLET BY MOUTH EVERY DAY 90 tablet 3    mv-min-folic-K1-lycopen-lutein (Centrum Silver Men) 788--300 mcg tablet Take 1 tablet by mouth once daily.      nitrofurantoin (Macrodantin) 100 mg capsule One PO bid x 7 28 capsule 0    polyethylene glycol (Glycolax, Miralax) 17 gram packet Take 17 g by mouth once daily as needed (constipation).      solifenacin (VESIcare) 5 mg tablet Take 1 tablet (5 mg) by mouth once daily. Swallow tablet whole; do not crush, chew, or split. 30 tablet 11    tamsulosin (Flomax) 0.4 mg 24 hr capsule Take 1 capsule (0.4 mg) by mouth once daily.       No current facility-administered medications for this visit.       PMH:  Past Medical History:   Diagnosis Date    Aortic stenosis     s/p TAVR 6/29/21    BRCA positive     Chronic pain disorder     Coronary artery disease     COVID-19 09/23/2022    Displaced fracture of right acetabulum (Multi)     Graves disease     Hip fracture (Multi)     Hyperlipidemia     Hypertension     Hyperthyroidism     Myocardial infarction  (Multi) 1999    acute plaque rupture in the LAD    Pelvic fracture (Multi)     Pulmonary nodule     stable    Stroke (Multi) 05/2016    hemorrhagic stroke with right hemiparesis in 2016       PSH:  Past Surgical History:   Procedure Laterality Date    ANKLE SURGERY      CARDIAC CATHETERIZATION      CARDIAC VALVE REPLACEMENT  06/29/2021    transcatheter aortic valve replacement    CORONARY ANGIOPLASTY WITH STENT PLACEMENT  10/06/2014    Cath Stent Placement    CT ANGIO NECK  07/18/2021    CT NECK ANGIO W AND WO IV CONTRAST 7/18/2021 [facility] CL[facility]ICAL LEGAC[facility]    CT HEAD ANGIO W AND WO IV CONTRAST  07/18/2021    CT HEAD ANGIO W AND WO IV CONTRAST 7/18/2021 [facility] CLINICAL LEGACY    FEMUR FRACTURE SURGERY  11/06/2014    Femur Repair       FMH:  No family history on file.    SHx:  Social History     Tobacco Use    Smoking status: Never    Smokeless tobacco: Never   Substance Use Topics    Alcohol use: Yes     Comment: one drink weekly    Drug use: Never       Allergies:  Allergies   Allergen Reactions    Penicillin Unknown       Procedure:  After informed consent was obtained, the patient was taken to the procedure room for cystoscopy due to urgency, frequency.     Cystoscopy     Procedure Note:    A sterile prep and drape was performed in standard fashion. Lidocaine was used for topical anesthesia. A flexible cystoscope was inserted into the urethra without difficulty revealing normal urethra.     The prostate was trilobar, moderately obstructive, approx 50g     Then entered the bladder revealing bladder mucosa with no erythematous patches or plaques, foreign bodies, stones or papillary lesions. The ureteral orifices were visualized bilaterally. These were orthotopic in location and effluxing clear urine. No masses were seen on retroflexion. Bladder was heavily trabeculaTed with numerous cellules    Post-Procedure:   The cystoscope was removed. The vital signs were stable . The patient tolerated the procedure well. There were  no complications.       Assessment/Plan  Cystoscopy shows no stricture but a modestly sized prostate a very trabeculated bladder consistent with long-term bladder outlet obstruction.  I discussed that most commonly, men with overactive bladder symptoms have a primary outlet obstruction that causes secondary bladder overactivity.  I suspect this is the case for him based on his bladder appearance on cystoscopy.  He is undergoing extensive Ortho surgery next few weeks.  We will hold off on anything for now.  Continue on Vesicare.  He will see me back in 4 months to revisit.  Would recommend a HoLEP or TURP plus Botox.  He has no contraindication to proceed with orthopedic surgery from a urologic perspective.        Scribe Attestation  By signing my name below, I, Brandt Melo, attest that this documentation  has been prepared under the direction and in the presence of Landon Augustin MD.

## 2024-09-11 NOTE — CPM/PAT NURSE NOTE
CPM/PAT Nurse Note      Name: Nasir Camacho (Nasir Camacho)  /Age: 1955/69 y.o.       Past Medical History:   Diagnosis Date    Aortic stenosis     s/p TAVR 21    BRCA positive     Chronic pain disorder     Coronary artery disease     COVID-19 2022    Displaced fracture of right acetabulum (Multi)     Graves disease     Hip fracture (Multi)     Hyperlipidemia     Hypertension     Hyperthyroidism     Myocardial infarction (Multi)     acute plaque rupture in the LAD    Pelvic fracture (Multi)     Pulmonary nodule     stable    Stroke (Multi) 2016    hemorrhagic stroke with right hemiparesis in 2016       Past Surgical History:   Procedure Laterality Date    ANKLE SURGERY      CARDIAC CATHETERIZATION      CARDIAC VALVE REPLACEMENT  2021    transcatheter aortic valve replacement    CORONARY ANGIOPLASTY WITH STENT PLACEMENT  10/06/2014    Cath Stent Placement    CT ANGIO NECK  2021    CT NECK ANGIO W AND WO IV CONTRAST 2021 [facility]ICAL LEGACY    CT HEAD ANGIO W AND WO IV CONTRAST  2021    CT HEAD ANGIO W AND WO IV CONTRAST 2021 [facility] CL[facility]ICAL LEGAC[facility]    FEMUR FRACTURE SURGERY  2014    Femur Repair       Patient  has no history on file for sexual activity.    No family history on file.    Allergies   Allergen Reactions    Penicillin Unknown       Prior to Admission medications    Medication Sig Start Date End Date Taking? Authorizing Provider   acetaminophen (Tylenol) 325 mg tablet Take 2 tablets (650 mg) by mouth every 6 hours if needed for mild pain (1 - 3) or moderate pain (4 - 6). 24   Edith Sage, APRN-CNP   amLODIPine (Norvasc) 10 mg tablet 1/2 tablet or 5 mg every day 24   Alejandro Villeda MD MPH   aspirin 81 mg chewable tablet Chew 1 tablet (81 mg) once daily. 21   Historical Provider, MD   atorvastatin (Lipitor) 40 mg tablet Take 1 tablet (40 mg) by mouth once daily. 24  Carroll Lai MD   baclofen (Lioresal) 5 mg tablet  Take 1 tablet (5 mg) by mouth 3 times a day. 7/10/24   Historical Provider, MD   carvedilol (Coreg) 3.125 mg tablet TAKE 1 TABLET BY MOUTH TWICE A DAY WITH FOOD 8/20/24   Alejandro Villeda MD MPH   cholecalciferol (Vitamin D-3) 25 MCG (1000 UT) tablet Take 1 tablet by mouth once daily. 5/17/21   Historical Provider, MD   citalopram (CeleXA) 20 mg tablet TAKE 1 TABLET BY MOUTH EVERY DAY 2/12/24   Alejandro Villeda MD MPH   cyclobenzaprine (Flexeril) 5 mg tablet Take 1 tablet (5 mg) by mouth 3 times a day. 6/26/24   Jeanne Lubin APRN-CNP   losartan (Cozaar) 50 mg tablet TAKE 1 TABLET BY MOUTH EVERY DAY 8/13/24   Alejandro Villeda MD MPH   mv-min-folic-K1-lycopen-lutein (Centrum Silver Men) 390--300 mcg tablet Take 1 tablet by mouth once daily.    Historical Provider, MD   nitrofurantoin (Macrodantin) 100 mg capsule One PO bid x 7 8/30/24   Alejandro Villeda MD MPH   polyethylene glycol (Glycolax, Miralax) 17 gram packet Take 17 g by mouth once daily as needed (constipation). 6/25/24   Edith Sage APRN-CNP   solifenacin (VESIcare) 5 mg tablet Take 1 tablet (5 mg) by mouth once daily. Swallow tablet whole; do not crush, chew, or split. 7/17/24 7/17/25  Landon Augustin MD   tamsulosin (Flomax) 0.4 mg 24 hr capsule Take 1 capsule (0.4 mg) by mouth once daily.    Historical Provider, MD WESLEY THORPE     DASI Risk Score    No data to display       Caprini DVT Assessment    No data to display       Modified Frailty Index    No data to display       CHADS2 Stroke Risk  Current as of 36 minutes ago        N/A 3 to 100%: High Risk   2 to < 3%: Medium Risk   0 to < 2%: Low Risk     Last Change: N/A          This score determines the patient's risk of having a stroke if the patient has atrial fibrillation.        This score is not applicable to this patient. Components are not calculated.          Revised Cardiac Risk Index    No data to display       Apfel Simplified Score    No data to display        Risk Analysis Index Results This Encounter    No data found in the last 1 encounters.     Scheduled for right total hip arthroplasty, percutaneous acetabular fixation and retrograde femoral nail removal with Dr. Kerr on 9/25/24.    PCP/Pulmonology: Alejandro Villeda MD L[facility] 8/28/24  PER NOTE: 8/28/24- From a general medical perspective the patient appears stable for upcoming hip reconstruction.  His blood pressure was somewhat elevated today and his amlodipine was restarted at 5 mg a day.  While neurologically he appears stable, if formal neurology clearance is needed for upcoming hip reconstruction, then Dr. Mccormick who has been following him from a stroke perspective should be consulted.  Dr Carroll Lai is his cardiologist and the patient will follow-up with him prior to any surgery.    Orthopaedic Surgery: Rico Kerr MD LOV 9/9/24 seen for right hip pain.     Urology: Landon Augustin MD LOV 7/17/24    Cardiology: Carroll Lai MD LOV 6/6/24 Follow-up CAD, status post TAVR (3-year).  P: [phone], F: [phone]     -Transthoracic Echo; 9/4/24  CONCLUSIONS:   1. Poorly visualized anatomical structures due to suboptimal image quality.   2. Left ventricular ejection fraction is normal, calculated by Becker's biplane at 64%.   3. Spectral Doppler shows an abnormal pattern of left ventricular diastolic filling.   4. There is normal right ventricular global systolic function.   5. The left atrium is enlarged.   6. Mild aortic valve regurgitation.    - VASC US CAROTID ARTERY DUPLEX BILATERAL; 9/4/2024   IMPRESSION:  No evidence of hemodynamically significant stenosis per NASCET  criteria.    -ECG; 6/6/24  Sinus bradycardia  Otherwise normal ECG    Neurology: Tereza Mccormick MD LOV 9/30/21 follow-up visit for stroke.    Nurse Plan of Action:   Risk Stratification faxed to cardiology office on 9/11/24.  Medication Instructions:  Instructed to hold vitamins, supplements, and NSAIDs 7 days prior to  surgery.      Jeanne Kelly RN  Pre Admission Testing

## 2024-09-12 ENCOUNTER — DOCUMENTATION (OUTPATIENT)
Dept: CARDIOLOGY | Facility: CLINIC | Age: 69
End: 2024-09-12
Payer: MEDICARE

## 2024-09-12 ENCOUNTER — TELEPHONE (OUTPATIENT)
Dept: SCHEDULING | Age: 69
End: 2024-09-12
Payer: MEDICARE

## 2024-09-12 NOTE — PROGRESS NOTES
The patient is under my care for cardiovascular disease and was last seen in the office on June 6, 2024.  He underwent coronary stent in 1999 following non-STEMI.  In 2021, he underwent TAVR for management of aortic stenosis.  At his last office visit, he was clinically stable.  Echocardiogram was just performed on September 4, 2024 and showed normal function of the TAVR valve.    The patient is stable to undergo orthopedic surgery with an acceptably low perioperative cardiovascular risk.  He does not require any additional testing.  Cardiac catheterization prior to TAVR in 2021 showed mild nonobstructive coronary artery disease.  We recommend that low-dose aspirin be continued uninterrupted.    Sincerely,    Carroll Lai M.D.     Senior Attending Physician, [facility]   [facility]    [facility] Chair for Cardiovascular Excellence  [employer]  [place], [place]

## 2024-09-13 ENCOUNTER — TELEPHONE (OUTPATIENT)
Dept: PULMONOLOGY | Facility: HOSPITAL | Age: 69
End: 2024-09-13
Payer: MEDICARE

## 2024-09-13 DIAGNOSIS — U07.1 COVID: Primary | ICD-10-CM

## 2024-09-13 RX ORDER — NIRMATRELVIR AND RITONAVIR 300-100 MG
3 KIT ORAL 2 TIMES DAILY
Qty: 30 TABLET | Refills: 0 | Status: SHIPPED | OUTPATIENT
Start: 2024-09-13 | End: 2024-09-18

## 2024-09-14 NOTE — PROGRESS NOTES
Patient has come down with COVID and he has been placed on Paxlovid.  He is holding his atorvastatin.  He is not taking Flomax.

## 2024-09-20 ENCOUNTER — ANESTHESIA EVENT (OUTPATIENT)
Dept: OPERATING ROOM | Facility: HOSPITAL | Age: 69
End: 2024-09-20
Payer: MEDICARE

## 2024-09-20 ENCOUNTER — PRE-ADMISSION TESTING (OUTPATIENT)
Dept: PREADMISSION TESTING | Facility: HOSPITAL | Age: 69
End: 2024-09-20
Payer: MEDICARE

## 2024-09-20 VITALS
SYSTOLIC BLOOD PRESSURE: 100 MMHG | WEIGHT: 150.9 LBS | DIASTOLIC BLOOD PRESSURE: 56 MMHG | BODY MASS INDEX: 21.6 KG/M2 | HEIGHT: 70 IN | HEART RATE: 74 BPM | OXYGEN SATURATION: 99 % | TEMPERATURE: 97.1 F

## 2024-09-20 DIAGNOSIS — Z01.810 PREOPERATIVE CARDIOVASCULAR EXAMINATION: ICD-10-CM

## 2024-09-20 DIAGNOSIS — Z01.818 PRE-OP EXAM: Primary | ICD-10-CM

## 2024-09-20 DIAGNOSIS — S32.461A CLOSED DISPLACED COMBINED TRANSVERSE-POSTERIOR FRACTURE OF RIGHT ACETABULUM, INITIAL ENCOUNTER (MULTI): ICD-10-CM

## 2024-09-20 DIAGNOSIS — Z96.9 PRESENCE OF RETAINED HARDWARE: ICD-10-CM

## 2024-09-20 DIAGNOSIS — Z01.818 PREOPERATIVE CLEARANCE: ICD-10-CM

## 2024-09-20 DIAGNOSIS — M16.51 POST-TRAUMATIC OSTEOARTHRITIS OF RIGHT HIP: ICD-10-CM

## 2024-09-20 DIAGNOSIS — Z01.811 PREOPERATIVE RESPIRATORY EXAMINATION: ICD-10-CM

## 2024-09-20 LAB
ABO GROUP (TYPE) IN BLOOD: NORMAL
ANION GAP SERPL CALC-SCNC: 13 MMOL/L (ref 10–20)
ANTIBODY SCREEN: NORMAL
BUN SERPL-MCNC: 32 MG/DL (ref 6–23)
CALCIUM SERPL-MCNC: 9.7 MG/DL (ref 8.6–10.6)
CHLORIDE SERPL-SCNC: 102 MMOL/L (ref 98–107)
CO2 SERPL-SCNC: 30 MMOL/L (ref 21–32)
CREAT SERPL-MCNC: 1.09 MG/DL (ref 0.5–1.3)
EGFRCR SERPLBLD CKD-EPI 2021: 73 ML/MIN/1.73M*2
ERYTHROCYTE [DISTWIDTH] IN BLOOD BY AUTOMATED COUNT: 13.4 % (ref 11.5–14.5)
GLUCOSE SERPL-MCNC: 85 MG/DL (ref 74–99)
HCT VFR BLD AUTO: 38.6 % (ref 41–52)
HGB BLD-MCNC: 12.4 G/DL (ref 13.5–17.5)
MCH RBC QN AUTO: 31.8 PG (ref 26–34)
MCHC RBC AUTO-ENTMCNC: 32.1 G/DL (ref 32–36)
MCV RBC AUTO: 99 FL (ref 80–100)
NRBC BLD-RTO: 0 /100 WBCS (ref 0–0)
PLATELET # BLD AUTO: 226 X10*3/UL (ref 150–450)
POTASSIUM SERPL-SCNC: 5.1 MMOL/L (ref 3.5–5.3)
RBC # BLD AUTO: 3.9 X10*6/UL (ref 4.5–5.9)
RH FACTOR (ANTIGEN D): NORMAL
SODIUM SERPL-SCNC: 140 MMOL/L (ref 136–145)
WBC # BLD AUTO: 5.8 X10*3/UL (ref 4.4–11.3)

## 2024-09-20 PROCEDURE — 87081 CULTURE SCREEN ONLY: CPT

## 2024-09-20 PROCEDURE — 86901 BLOOD TYPING SEROLOGIC RH(D): CPT

## 2024-09-20 PROCEDURE — 85027 COMPLETE CBC AUTOMATED: CPT

## 2024-09-20 PROCEDURE — 36415 COLL VENOUS BLD VENIPUNCTURE: CPT

## 2024-09-20 PROCEDURE — 99205 OFFICE O/P NEW HI 60 MIN: CPT | Performed by: ANESTHESIOLOGY

## 2024-09-20 PROCEDURE — 80048 BASIC METABOLIC PNL TOTAL CA: CPT

## 2024-09-20 PROCEDURE — 86923 COMPATIBILITY TEST ELECTRIC: CPT

## 2024-09-20 RX ORDER — CHLORHEXIDINE GLUCONATE ORAL RINSE 1.2 MG/ML
15 SOLUTION DENTAL AS NEEDED
Qty: 120 ML | Refills: 0 | Status: SHIPPED | OUTPATIENT
Start: 2024-09-20 | End: 2024-09-29 | Stop reason: HOSPADM

## 2024-09-20 RX ORDER — CHLORHEXIDINE GLUCONATE 40 MG/ML
SOLUTION TOPICAL DAILY PRN
Qty: 118 ML | Refills: 0 | Status: SHIPPED | OUTPATIENT
Start: 2024-09-20 | End: 2024-09-29 | Stop reason: HOSPADM

## 2024-09-20 ASSESSMENT — DUKE ACTIVITY SCORE INDEX (DASI)
CAN YOU WALK A BLOCK OR TWO ON LEVEL GROUND: NO
TOTAL_SCORE: 12.45
CAN YOU DO HEAVY WORK AROUND THE HOUSE LIKE SCRUBBING FLOORS OR LIFTING AND MOVING HEAVY FURNITURE: NO
CAN YOU WALK INDOORS, SUCH AS AROUND YOUR HOUSE: YES
CAN YOU PARTICIPATE IN MODERATE RECREATIONAL ACTIVITIES LIKE GOLF, BOWLING, DANCING, DOUBLES TENNIS OR THROWING A BASEBALL OR FOOTBALL: NO
CAN YOU DO MODERATE WORK AROUND THE HOUSE LIKE VACUUMING, SWEEPING FLOORS OR CARRYING GROCERIES: NO
CAN YOU RUN A SHORT DISTANCE: NO
CAN YOU DO LIGHT WORK AROUND THE HOUSE LIKE DUSTING OR WASHING DISHES: YES
CAN YOU CLIMB A FLIGHT OF STAIRS OR WALK UP A HILL: NO
CAN YOU DO YARD WORK LIKE RAKING LEAVES, WEEDING OR PUSHING A MOWER: NO
DASI METS SCORE: 4.3
CAN YOU PARTICIPATE IN STRENOUS SPORTS LIKE SWIMMING, SINGLES TENNIS, FOOTBALL, BASKETBALL, OR SKIING: NO
CAN YOU HAVE SEXUAL RELATIONS: YES
CAN YOU TAKE CARE OF YOURSELF (EAT, DRESS, BATHE, OR USE TOILET): YES

## 2024-09-20 ASSESSMENT — ENCOUNTER SYMPTOMS
LIGHT-HEADEDNESS: 1
LIMITED RANGE OF MOTION: 1
NECK NEGATIVE: 1
CARDIOVASCULAR NEGATIVE: 1
NUMBNESS: 1
COUGH: 1
MYALGIAS: 1
CONSTITUTIONAL NEGATIVE: 1
ARTHRALGIAS: 1
GASTROINTESTINAL NEGATIVE: 1
ENDOCRINE NEGATIVE: 1

## 2024-09-20 ASSESSMENT — LIFESTYLE VARIABLES: SMOKING_STATUS: NONSMOKER

## 2024-09-20 NOTE — H&P (VIEW-ONLY)
CPM/PAT Evaluation       Name: Nasir Camacho (Nasir Camacho)  /Age: 1955/69 y.o.     In-Person       Chief Complaint: PAT; Hip Arthoplasty, Femur Marty     HPI  69 y.o.  male  scheduled for right hip arthoplasty on  with Dr. Kerr for  fall injury.  Complex PMHX includes CAD, NSTEMI s/p LAD PCI , aortic stenosis s/p TAVR, HTN, HLD, hemorraghic stroke with right hemiparesis, pulmonary nodule.  Presents to CPM today for perioperative risk stratification and optimization.      Past Medical History:   Diagnosis Date    Aortic stenosis     s/p TAVR 21    BRCA positive     Chronic pain disorder     Coronary artery disease     COVID-19 2022    Displaced fracture of right acetabulum (Multi)     Graves disease     Hip fracture (Multi)     Hyperlipidemia     Hypertension     Hyperthyroidism     Myocardial infarction (Multi)     acute plaque rupture in the LAD    Pelvic fracture (Multi)     Pulmonary nodule     stable    Stroke (Multi) 2016    hemorrhagic stroke with right hemiparesis in 2016       Past Surgical History:   Procedure Laterality Date    ANKLE SURGERY      CARDIAC CATHETERIZATION      CARDIAC VALVE REPLACEMENT  2021    transcatheter aortic valve replacement    CORONARY ANGIOPLASTY WITH STENT PLACEMENT  10/06/2014    Cath Stent Placement    CT ANGIO NECK  2021    CT NECK ANGIO W AND WO IV CONTRAST 2021 [facility] CL[facility]ICAL LEGAC[facility]    CT HEAD ANGIO W AND WO IV CONTRAST  2021    CT HEAD ANGIO W AND WO IV CONTRAST 2021 [facility] CL[facility]ICAL LEGAC[facility]    FEMUR FRACTURE SURGERY  2014    Femur Repair       Patient  has no history on file for sexual activity.    No family history on file.    Allergies   Allergen Reactions    Penicillin Unknown       Prior to Admission medications    Medication Sig Start Date End Date Taking? Authorizing Provider   acetaminophen (Tylenol) 325 mg tablet Take 2 tablets (650 mg) by mouth every 6 hours if needed for mild pain (1 - 3) or  moderate pain (4 - 6). 6/25/24   Edith Sage APRN-CNP   amLODIPine (Norvasc) 10 mg tablet 1/2 tablet or 5 mg every day 8/28/24   Alejandro Villeda MD MPH   aspirin 81 mg chewable tablet Chew 1 tablet (81 mg) once daily. 7/20/21   Historical Provider, MD   atorvastatin (Lipitor) 40 mg tablet Take 1 tablet (40 mg) by mouth once daily. 6/6/24 6/6/25  Carroll Lai MD   baclofen (Lioresal) 5 mg tablet Take 1 tablet (5 mg) by mouth 3 times a day. 7/10/24   Historical Provider, MD   carvedilol (Coreg) 3.125 mg tablet TAKE 1 TABLET BY MOUTH TWICE A DAY WITH FOOD 8/20/24   Alejandro Villeda MD MPH   cholecalciferol (Vitamin D-3) 25 MCG (1000 UT) tablet Take 1 tablet by mouth once daily. 5/17/21   Historical Provider, MD   citalopram (CeleXA) 20 mg tablet TAKE 1 TABLET BY MOUTH EVERY DAY 2/12/24   Alejandro Villeda MD MPH   cyclobenzaprine (Flexeril) 5 mg tablet Take 1 tablet (5 mg) by mouth 3 times a day. 6/26/24   AMBAR Rivera-CNP   losartan (Cozaar) 50 mg tablet TAKE 1 TABLET BY MOUTH EVERY DAY 8/13/24   Alejandro Villeda MD MPH   mv-min-folic-K1-lycopen-lutein (Centrum Silver Men) 464--300 mcg tablet Take 1 tablet by mouth once daily.    Historical Provider, MD   nirmatrelvir-ritonavir (Paxlovid) 300 mg (150 mg x 2)-100 mgi tablet therapy pack Take 3 tablets by mouth 2 times a day for 5 days. Follow the instructions on the package 9/13/24 9/18/24  Alejandro Villeda MD MPH   polyethylene glycol (Glycolax, Miralax) 17 gram packet Take 17 g by mouth once daily as needed (constipation). 6/25/24   AMBAR Galeana-CNP   solifenacin (VESIcare) 5 mg tablet Take 1 tablet (5 mg) by mouth once daily. Swallow tablet whole; do not crush, chew, or split. 7/17/24 7/17/25  Landon Augustin MD   nitrofurantoin (Macrodantin) 100 mg capsule One PO bid x 7 8/30/24 9/14/24  Alejandro Villeda MD MPH   tamsulosin (Flomax) 0.4 mg 24 hr capsule Take 1 capsule (0.4 mg) by mouth once daily.  9/14/24   Historical Provider, MD OLSON ROS:   Constitutional:   neg    Neuro/Psych:    numbness   light-headedness  Eyes:   Ears:   Nose:   Mouth:   neg    Throat:   neg    Neck:   neg    Cardio:   neg    Respiratory:    cough  Endocrine:   neg    GI:   neg    :   neg    Musculoskeletal:    arthralgias   myalgias   decreased ROM  Hematologic:   neg    Skin:  neg        Physical Exam  Constitutional:       Appearance: Normal appearance. He is normal weight.   HENT:      Head: Normocephalic and atraumatic.   Eyes:      Extraocular Movements: Extraocular movements intact.      Pupils: Pupils are equal, round, and reactive to light.   Cardiovascular:      Rate and Rhythm: Normal rate and regular rhythm.      Pulses: Normal pulses.      Heart sounds: Normal heart sounds.   Pulmonary:      Effort: Pulmonary effort is normal.      Breath sounds: Normal breath sounds.   Abdominal:      General: Abdomen is flat.   Musculoskeletal:         General: Signs of injury present.      Cervical back: Normal range of motion.      Comments: Weakness right upper and lower extremity   Skin:     General: Skin is warm and dry.   Neurological:      Mental Status: He is alert and oriented to person, place, and time. Mental status is at baseline.      Motor: Weakness (3 side) present.   Psychiatric:         Mood and Affect: Mood normal.         Behavior: Behavior normal.          PAT AIRWAY:   Airway:     Mallampati::  III    TM distance::  >3 FB    Neck ROM::  Full  normal        Visit Vitals  /56   Pulse 74   Temp 36.2 °C (97.1 °F) (Temporal)       DASI Risk Score      Flowsheet Row Most Recent Value   DASI SCORE 12.45   METS Score (Will be calculated only when all the questions are answered) 4.3          Caprini DVT Assessment      Flowsheet Row Most Recent Value   DVT Score 15   Current Status Elective major lower extremity arthroplasty   History Prior major surgery, Acute myocardial infarction, Hip, pelvis or leg fracture   Age 60-75  years   BMI 30 or less          Modified Frailty Index    No data to display       CHADS2 Stroke Risk  Current as of today        N/A 3 to 100%: High Risk   2 to < 3%: Medium Risk   0 to < 2%: Low Risk     Last Change: N/A          This score determines the patient's risk of having a stroke if the patient has atrial fibrillation.        This score is not applicable to this patient. Components are not calculated.          Revised Cardiac Risk Index      Flowsheet Row Most Recent Value   Revised Cardiac Risk Calculator 1          Apfel Simplified Score      Flowsheet Row Most Recent Value   Apfel Simplified Score Calculator 2          Risk Analysis Index Results This Encounter    No data found in the last 1 encounters.       Stop Bang Score      Flowsheet Row Most Recent Value   Do you snore loudly? 0   Do you often feel tired or fatigued after your sleep? 0   Has anyone ever observed you stop breathing in your sleep? 0   Do you have or are you being treated for high blood pressure? 1   Recent BMI (Calculated) 25.1   Is BMI greater than 35 kg/m2? 0=No   Age older than 50 years old? 1=Yes   Is your neck circumference greater than 17 inches (Male) or 16 inches (Female)? 0   Gender - Male 1=Yes   STOP-BANG Total Score 3          .No results found for this or any previous visit (from the past 24 hour(s)).     Assessment and Plan:  69 y.o.  male  scheduled for right hip arthoplasty on 9/25 with Dr. Kerr for  fall injury.  Complex PMHX includes CAD, NSTEMI s/p LAD PCI 1999, aortic stenosis s/p TAVR, HTN, HLD, hemorraghic stroke with right hemiparesis, pulmonary nodule.  Presents to CPM today for perioperative risk stratification and optimization.    Neuro:  Hemorrhagic stroke  - residual right sided hemiparesis   - alert and oriented  - exam reveals 3/5 right vs 5/5 left   - right upper extremity neuropathy   - Controlled HTN, HLD, ASA    Anxiety  - stable mood  - continue on Citalopram     Patient is at increased risk  for perioperative CVA secondary to  previous CVA/TIA, cardiac disease, HTN, increased age, operative time > 2.5 hours    HEENT:  No HEENT diagnosis or significant findings on chart review or clinical presentation and evaluation. No further preoperative testing/intervention indicated at this time.  Airway examination wnl   No dentures  ROM neck wnl       Cardiovascular:  CAD, NSTEMI s/p LAD PCI 1999  - low-dose aspirin be continued uninterrupted.   - Left ventricular ejection fraction is normal, calculated by Becker's biplane at 64%. There is normal right ventricular global systolic function.   - Cath 2021: Mild non-obstructive coronary artery disease   - No recent angina, dyspnea  - Follows with Dr Lai, cardiology.     HTN  - well controlled   - Continue Amlodipine; Hold Losartan day of surgery     HLD  - Controlled; Continue Statin    METS: 4.3  RCRI: 1 point, 6.0% risk for postoperative MACE   MARGI: 0.3% risk for 30 day postoperative MACE  EKG - NSR    Pulmonary:  No pulmonary diagnosis or significant findings on chart review or clinical presentation.  No further preoperative testing is indicated at this time.    Stop Bang score is 3 placing patient at moderate risk for JEFF  ARISCAT: >45 points, 42.1% risk of in-hospital postoperative pulmonary complication  PRODIGY: High risk for opioid induced respiratory depression    Recent COVID 19 infection last week, symptoms resolved. No ongoing SOB, cough. Pulmonary examination revealed LCTAB.      Renal:   No renal diagnosis or significant findings on chart review, clinical presentation or evaluation. No further preoperative testing/intervention is indicated at this time.    Pt at Low risk for perioperative WILFRID based on Dynamic Predictive Scoring Tool for Perioperative WILFRID   - BMP ordered   - Previous Cr at baseline     Endocrine:  No endocrine diagnosis or significant findings on chart review or clinical presentation and evaluation. No further testing or  intervention is indicated at this time.  - No history of T2DM  - No thyroid dysfunction     Hematologic:  No hematologic diagnosis or significant findings on chart review or clinical presentation and evaluation. No further testing or intervention is indicated at this time.   Caprini Score 15, patient at High risk for perioperative DVT.  Patient provided with VTE education/handout.    - CBC ordered  - Type and Screen ordered    Gastrointestinal:   No GI diagnosis or significant findings on chart review or clinical presentation and evaluation.   - No history of GERD  Eat-10 score 0  Apfel 2    Infectious disease:   No infectious diagnosis or significant findings on chart review or clinical presentation and evaluation.   Prescription provided for CHG body wash and dental rinse. CHG use instructions reviewed and provided to patient.  Staph screen collected    Musculoskeletal:   Displaced right acetabular fracture with incongruity of the hip joint   - Pain controlled with Tylenol     Anesthesia/Airway:  No anesthesia complications  Airway examination wnl   No dentures  ROM neck wnl       Medication instructions and NPO guidelines reviewed with the patient.  All questions or concerns discussed and addressed.      Patient seen and staffed with Dr. Crow

## 2024-09-20 NOTE — CPM/PAT H&P
CPM/PAT Evaluation       Name: Nasir Camacho (Nasir Camacho)  /Age: 1955/69 y.o.     In-Person       Chief Complaint: PAT; Hip Arthoplasty, Femur Marty     HPI  69 y.o.  male  scheduled for right hip arthoplasty on  with Dr. Kerr for  fall injury.  Complex PMHX includes CAD, NSTEMI s/p LAD PCI , aortic stenosis s/p TAVR, HTN, HLD, hemorraghic stroke with right hemiparesis, pulmonary nodule.  Presents to CPM today for perioperative risk stratification and optimization.      Past Medical History:   Diagnosis Date    Aortic stenosis     s/p TAVR 21    BRCA positive     Chronic pain disorder     Coronary artery disease     COVID-19 2022    Displaced fracture of right acetabulum (Multi)     Graves disease     Hip fracture (Multi)     Hyperlipidemia     Hypertension     Hyperthyroidism     Myocardial infarction (Multi)     acute plaque rupture in the LAD    Pelvic fracture (Multi)     Pulmonary nodule     stable    Stroke (Multi) 2016    hemorrhagic stroke with right hemiparesis in 2016       Past Surgical History:   Procedure Laterality Date    ANKLE SURGERY      CARDIAC CATHETERIZATION      CARDIAC VALVE REPLACEMENT  2021    transcatheter aortic valve replacement    CORONARY ANGIOPLASTY WITH STENT PLACEMENT  10/06/2014    Cath Stent Placement    CT ANGIO NECK  2021    CT NECK ANGIO W AND WO IV CONTRAST 2021 [facility]    CT HEAD ANGIO W AND WO IV CONTRAST  2021    CT HEAD ANGIO W AND WO IV CONTRAST 2021 [facility]    FEMUR FRACTURE SURGERY  2014    Femur Repair       Patient  has no history on file for sexual activity.    No family history on file.    Allergies   Allergen Reactions    Penicillin Unknown       Prior to Admission medications    Medication Sig Start Date End Date Taking? Authorizing Provider   acetaminophen (Tylenol) 325 mg tablet Take 2 tablets (650 mg) by mouth every 6 hours if needed for mild pain (1 - 3) or  moderate pain (4 - 6). 6/25/24   Edith Sage APRN-CNP   amLODIPine (Norvasc) 10 mg tablet 1/2 tablet or 5 mg every day 8/28/24   Alejandro Villeda MD MPH   aspirin 81 mg chewable tablet Chew 1 tablet (81 mg) once daily. 7/20/21   Historical Provider, MD   atorvastatin (Lipitor) 40 mg tablet Take 1 tablet (40 mg) by mouth once daily. 6/6/24 6/6/25  Carroll Lai MD   baclofen (Lioresal) 5 mg tablet Take 1 tablet (5 mg) by mouth 3 times a day. 7/10/24   Historical Provider, MD   carvedilol (Coreg) 3.125 mg tablet TAKE 1 TABLET BY MOUTH TWICE A DAY WITH FOOD 8/20/24   Alejandro Villeda MD MPH   cholecalciferol (Vitamin D-3) 25 MCG (1000 UT) tablet Take 1 tablet by mouth once daily. 5/17/21   Historical Provider, MD   citalopram (CeleXA) 20 mg tablet TAKE 1 TABLET BY MOUTH EVERY DAY 2/12/24   Alejandro Villeda MD MPH   cyclobenzaprine (Flexeril) 5 mg tablet Take 1 tablet (5 mg) by mouth 3 times a day. 6/26/24   AMBAR Rivera-CNP   losartan (Cozaar) 50 mg tablet TAKE 1 TABLET BY MOUTH EVERY DAY 8/13/24   Alejandro Villeda MD MPH   mv-min-folic-K1-lycopen-lutein (Centrum Silver Men) 345--300 mcg tablet Take 1 tablet by mouth once daily.    Historical Provider, MD   nirmatrelvir-ritonavir (Paxlovid) 300 mg (150 mg x 2)-100 mgi tablet therapy pack Take 3 tablets by mouth 2 times a day for 5 days. Follow the instructions on the package 9/13/24 9/18/24  Alejandro Villeda MD MPH   polyethylene glycol (Glycolax, Miralax) 17 gram packet Take 17 g by mouth once daily as needed (constipation). 6/25/24   AMBAR Galeana-CNP   solifenacin (VESIcare) 5 mg tablet Take 1 tablet (5 mg) by mouth once daily. Swallow tablet whole; do not crush, chew, or split. 7/17/24 7/17/25  Landon Augustin MD   nitrofurantoin (Macrodantin) 100 mg capsule One PO bid x 7 8/30/24 9/14/24  Alejandro Villeda MD MPH   tamsulosin (Flomax) 0.4 mg 24 hr capsule Take 1 capsule (0.4 mg) by mouth once daily.  9/14/24   Historical Provider, MD OLSON ROS:   Constitutional:   neg    Neuro/Psych:    numbness   light-headedness  Eyes:   Ears:   Nose:   Mouth:   neg    Throat:   neg    Neck:   neg    Cardio:   neg    Respiratory:    cough  Endocrine:   neg    GI:   neg    :   neg    Musculoskeletal:    arthralgias   myalgias   decreased ROM  Hematologic:   neg    Skin:  neg        Physical Exam  Constitutional:       Appearance: Normal appearance. He is normal weight.   HENT:      Head: Normocephalic and atraumatic.   Eyes:      Extraocular Movements: Extraocular movements intact.      Pupils: Pupils are equal, round, and reactive to light.   Cardiovascular:      Rate and Rhythm: Normal rate and regular rhythm.      Pulses: Normal pulses.      Heart sounds: Normal heart sounds.   Pulmonary:      Effort: Pulmonary effort is normal.      Breath sounds: Normal breath sounds.   Abdominal:      General: Abdomen is flat.   Musculoskeletal:         General: Signs of injury present.      Cervical back: Normal range of motion.      Comments: Weakness right upper and lower extremity   Skin:     General: Skin is warm and dry.   Neurological:      Mental Status: He is alert and oriented to person, place, and time. Mental status is at baseline.      Motor: Weakness (3 side) present.   Psychiatric:         Mood and Affect: Mood normal.         Behavior: Behavior normal.          PAT AIRWAY:   Airway:     Mallampati::  III    TM distance::  >3 FB    Neck ROM::  Full  normal        Visit Vitals  /56   Pulse 74   Temp 36.2 °C (97.1 °F) (Temporal)       DASI Risk Score      Flowsheet Row Most Recent Value   DASI SCORE 12.45   METS Score (Will be calculated only when all the questions are answered) 4.3          Caprini DVT Assessment      Flowsheet Row Most Recent Value   DVT Score 15   Current Status Elective major lower extremity arthroplasty   History Prior major surgery, Acute myocardial infarction, Hip, pelvis or leg fracture   Age 60-75  years   BMI 30 or less          Modified Frailty Index    No data to display       CHADS2 Stroke Risk  Current as of today        N/A 3 to 100%: High Risk   2 to < 3%: Medium Risk   0 to < 2%: Low Risk     Last Change: N/A          This score determines the patient's risk of having a stroke if the patient has atrial fibrillation.        This score is not applicable to this patient. Components are not calculated.          Revised Cardiac Risk Index      Flowsheet Row Most Recent Value   Revised Cardiac Risk Calculator 1          Apfel Simplified Score      Flowsheet Row Most Recent Value   Apfel Simplified Score Calculator 2          Risk Analysis Index Results This Encounter    No data found in the last 1 encounters.       Stop Bang Score      Flowsheet Row Most Recent Value   Do you snore loudly? 0   Do you often feel tired or fatigued after your sleep? 0   Has anyone ever observed you stop breathing in your sleep? 0   Do you have or are you being treated for high blood pressure? 1   Recent BMI (Calculated) 25.1   Is BMI greater than 35 kg/m2? 0=No   Age older than 50 years old? 1=Yes   Is your neck circumference greater than 17 inches (Male) or 16 inches (Female)? 0   Gender - Male 1=Yes   STOP-BANG Total Score 3          .No results found for this or any previous visit (from the past 24 hour(s)).     Assessment and Plan:  69 y.o.  male  scheduled for right hip arthoplasty on 9/25 with Dr. Kerr for  fall injury.  Complex PMHX includes CAD, NSTEMI s/p LAD PCI 1999, aortic stenosis s/p TAVR, HTN, HLD, hemorraghic stroke with right hemiparesis, pulmonary nodule.  Presents to CPM today for perioperative risk stratification and optimization.    Neuro:  Hemorrhagic stroke  - residual right sided hemiparesis   - alert and oriented  - exam reveals 3/5 right vs 5/5 left   - right upper extremity neuropathy   - Controlled HTN, HLD, ASA    Anxiety  - stable mood  - continue on Citalopram     Patient is at increased risk  for perioperative CVA secondary to  previous CVA/TIA, cardiac disease, HTN, increased age, operative time > 2.5 hours    HEENT:  No HEENT diagnosis or significant findings on chart review or clinical presentation and evaluation. No further preoperative testing/intervention indicated at this time.  Airway examination wnl   No dentures  ROM neck wnl       Cardiovascular:  CAD, NSTEMI s/p LAD PCI 1999  - low-dose aspirin be continued uninterrupted.   - Left ventricular ejection fraction is normal, calculated by Becker's biplane at 64%. There is normal right ventricular global systolic function.   - Cath 2021: Mild non-obstructive coronary artery disease   - No recent angina, dyspnea  - Follows with Dr Lai, cardiology.     HTN  - well controlled   - Continue Amlodipine; Hold Losartan day of surgery     HLD  - Controlled; Continue Statin    METS: 4.3  RCRI: 1 point, 6.0% risk for postoperative MACE   MARGI: 0.3% risk for 30 day postoperative MACE  EKG - NSR    Pulmonary:  No pulmonary diagnosis or significant findings on chart review or clinical presentation.  No further preoperative testing is indicated at this time.    Stop Bang score is 3 placing patient at moderate risk for JEFF  ARISCAT: >45 points, 42.1% risk of in-hospital postoperative pulmonary complication  PRODIGY: High risk for opioid induced respiratory depression    Recent COVID 19 infection last week, symptoms resolved. No ongoing SOB, cough. Pulmonary examination revealed LCTAB.      Renal:   No renal diagnosis or significant findings on chart review, clinical presentation or evaluation. No further preoperative testing/intervention is indicated at this time.    Pt at Low risk for perioperative WILFRID based on Dynamic Predictive Scoring Tool for Perioperative WILFRID   - BMP ordered   - Previous Cr at baseline     Endocrine:  No endocrine diagnosis or significant findings on chart review or clinical presentation and evaluation. No further testing or  intervention is indicated at this time.  - No history of T2DM  - No thyroid dysfunction     Hematologic:  No hematologic diagnosis or significant findings on chart review or clinical presentation and evaluation. No further testing or intervention is indicated at this time.   Caprini Score 15, patient at High risk for perioperative DVT.  Patient provided with VTE education/handout.    - CBC ordered  - Type and Screen ordered    Gastrointestinal:   No GI diagnosis or significant findings on chart review or clinical presentation and evaluation.   - No history of GERD  Eat-10 score 0  Apfel 2    Infectious disease:   No infectious diagnosis or significant findings on chart review or clinical presentation and evaluation.   Prescription provided for CHG body wash and dental rinse. CHG use instructions reviewed and provided to patient.  Staph screen collected    Musculoskeletal:   Displaced right acetabular fracture with incongruity of the hip joint   - Pain controlled with Tylenol     Anesthesia/Airway:  No anesthesia complications  Airway examination wnl   No dentures  ROM neck wnl       Medication instructions and NPO guidelines reviewed with the patient.  All questions or concerns discussed and addressed.      Patient seen and staffed with Dr. Crow

## 2024-09-20 NOTE — PREPROCEDURE INSTRUCTIONS
.    Thank you for visiting [facility] today for your pre-procedure evaluation, you were seen by Dr Karena MCFARLANE ([phone])    This summary includes instructions and information to aid you during your perioperative period.  Please read carefully. If you have any questions about your visit today, please call the number listed above.  If you become ill or have any changes to your health before your surgery, please contact your primary care provider and alert your surgeon.    Preparing for your Surgery       Exercises  Preoperative Deep Breathing Exercises  Why it is important to do deep breathing exercises before my surgery?  Deep breathing exercises strengthen your breathing muscles.  This helps you to recover after your surgery and decreases the chance of breathing complications.  How are the deep breathing exercises done?  Sit straight with your back supported.  Breathe in deeply and slowly through your nose. Your lower rib cage should expand and your abdomen may move forward.  Hold that breath for 3 to 5 seconds.  Breathe out through pursed lips, slowly and completely.  Rest and repeat 10 times every hour while awake.  Rest longer if you become dizzy or lightheaded.      Preoperative Brain Exercises    What are brain exercises?  A brain exercise is any activity that engages your thinking (cognitive) skills.    What types of activities are considered brain exercises?  Jigsaw puzzles, crossword puzzles, word jumble, memory games, word search, and many more.  Many can be found free online or on your phone via a mobile ewa.    Why should I do brain exercises before my surgery?  More recent research has shown brain exercise before surgery can lower the risk of postoperative delirium (confusion) which can be especially important for older adults.  Patients who did brain exercises for 5 to 10 hours the days before surgery, cut their risk of postoperative delirium in half up to 1 week  after surgery.    Sit-to-Stand Exercise    What is the sit-to-stand exercise?  The sit-to-stand exercise strengthens the muscles of your lower body and muscles in the center of your body (core muscles for stability) helping to maintain and improve your strength and mobility.  How do I do the sit-to-stand exercise?  The goal is to do this exercise without using your arms or hands.  If this is too difficult, use your arms and hands or a chair with armrests to help slowly push yourself to the standing position and lower yourself back to the sitting position. As the movement becomes easier use your arms and hands less.    Steps to the sit-to-stand exercise  Sit up tall in a sturdy chair, knees bent, feet flat on the floor shoulder-width apart.  Shift your hips/pelvis forward in the chair to correctly position yourself for the next movement.  Lean forward at your hips.  Stand up straight putting equal weight on both feet.  Check to be sure you are properly aligned with the chair, in a slow controlled movement sit back down.  Repeat this exercise 10-15 times.  If needed you can do it fewer times until your strength improves.  Rest for 1 minute.  Do another 10-15 sit-to-stand exercises.  Try to do this in the morning and evening.        Instructions    Preoperative Fasting Guidelines    Why must I stop eating and drinking near surgery time?  With sedation, food or liquid in your stomach can enter your lungs causing serious complications  Food can increase nausea and vomiting  When do I need to stop eating and drinking before my surgery?      Do not eat any food or drink any liquids after midnight the night before your surgery/procedure.  You may have sips of water to take medications.            Simple things you can do to help prevent blood clots     Blood clots are blockages that can form in the body's veins. When a blood clot forms in your deep veins, it may be called a deep vein thrombosis, or DVT for short. Blood  clots can happen in any part of the body where blood flows, but they are most common in the arms and legs. If a piece of a blood clot breaks free and travels to the lungs, it is called a pulmonary embolus (PE). A PE can be a very serious problem.         Being in the hospital or having surgery can raise your chances of getting a blood clot because you may not be well enough to move around as much as you normally do.         Ways you can help prevent blood clots in the hospital       Wearing SCDs  SCDs stands for Sequential Compression Devices.   SCDs are special sleeves that wrap around your legs. They attach to a pump that fills them with air to gently squeeze your legs every few minutes.  This helps return the blood in your legs to your heart.   SCDs should only be taken off when walking or bathing. SCDs may not be comfortable, but they can help save your life.              Pump SCD leg sleeves  Wearing compression stockings - if your doctor orders them. These special snug-fitting stockings gently squeeze your legs to help blood flow.       Walking. Walking helps move the blood in your legs.   If your doctor says it is ok, try walking the halls at least   5 times a day. Ask us to help you get up, so you don't fall.      Taking any blood-thinning medicines your doctor orders.              Ways you can help prevent blood clots at home         Wearing compression stockings - if your doctor orders them.   Walking - to help move the blood in your legs.    Taking any blood-thinning medicines your doctor orders.      Signs of a blood clot or PE    Tell your doctor or nurse right away if you have any of the problems listed below.         If you are at home, seek medical care right away. Call 911 for chest pain or problems breathing.            Signs of a blood clot (DVT) - such as pain, swelling, redness, or warmth in your arm or legs.  Signs of a pulmonary embolism (PE) - such as chest pain or feeling short of breath     "  Tobacco and Alcohol;  Do not drink alcohol or smoke within 24 hours of surgery.  It is best to quit smoking for as long as possible before any surgery or procedure.       Other Instructions  Why did I have my nose, under my arms, and groin swabbed? The purpose of the swab is to identify Staphylococcus aureus inside your nose or on your skin.  The swab was sent to the laboratory for culture.  A positive swab/culture for Staphylococcus aureus is called colonization or carriage.   What is Staphylococcus aureus? Staphylococcus aureus, also known as \"staph\", is a germ found on the skin or in the nose of healthy people.  Sometimes Staphylococcus aureus can get into the body and cause an infection.  This can be minor (such as pimples, boils, or other skin problems).  It might also be serious (such as a blood infection, pneumonia, or a surgical site infection). What is Staphylococcus aureus colonization or carriage? Colonization or carriage means that a person has the germ but is not sick from it.  These bacteria can be spread on the hands or when breathing or sneezing. How is Staphylococcus aureus spread? It is most often spread by close contact with a person or item that carries it. What happens if my culture is positive for Staphylococcus aureus? Your doctor/medical team will use this information to guide any antibiotic treatment which may be necessary.  Regardless of the culture results, we will clean the inside of your nose with a betadine swab just before you have your surgery. Will I get an infection if I have Staphylococcus aureus in my nose or on my skin? Anyone can get an infection with Staphylococcus aureus.  However, the best way to reduce your risk of infection is to follow the instructions provided to you for the use of your CHG soap and dental rinse.  , Body Wash; What is a home preoperative antibacterial shower? This shower is a way of cleaning the skin with a germ-killing solution before surgery.  The " solution contains chlorhexidine, commonly known as CHG.  CHG is a skin cleanser with germ-killing ability.  Let your doctor know if you are allergic to chlorhexidine. Why do I need to take a preoperative antibacterial shower? Skin is not sterile.  It is best to try to make your skin as free of germs as possible before surgery.  Proper cleansing with a germ-killing soap before surgery can lower the number of germs on your skin.  This helps to reduce the risk of infection at the surgical site.  Following the instructions listed below will help you prepare your skin for surgery.   How do I use the solution? Steps:  Begin using your CHG soap 5 days before your scheduled surgery on ___________.   First, wash and rinse your hair using the CHG soap. Keep CHG soap away from ear canals and eyes.  Rinse completely, do not condition.  Hair extensions should be removed. , Oral/Dental Rinse: What is oral/dental rinse?  It is a mouthwash. It is a way of cleaning the mouth with a germ-killing solution before your surgery.  The solution contains chlorhexidine, commonly known as CHG. It is used inside the mouth to kill a bacteria known as Staphylococcus aureus.  Let your doctor know if you are allergic to Chlorhexidine. Why do I need to use CHG oral/dental rinse? The CHG oral/dental rinse helps to kill a bacteria in your mouth known as Staphylococcus aureus.  This reduces the risk of infection at the surgical site.  Using your CHG oral/dental rinse STEPS: Use your CHG oral/dental rinse after you brush your teeth the night before (at bedtime) and the morning of your surgery.  Follow all directions on your prescription label.  Use the cap on the container to measure 15 ml.  Swish (gargle if you can) the mouthwash in your mouth for at least 30 seconds, (do not swallow) and spit out.  After you use your CHG rinse, do not rinse your mouth with water, drink or eat.  Please refer to the prescription label for the appropriate time to resume  oral intake What side effects might I have using the CHG oral/dental rinse? CHG rinse will stick to plaque on the teeth.  Brush and floss just before use.  Teeth brushing will help avoid staining of plaque during use.             The Week before Surgery        Seven days before Surgery  Check your [facility] medication instructions  Do the exercises provided to you by [facility]   Arrange for a responsible, adult licensed  to take you home after surgery and stay with you for 24 hours.  You will not be permitted to drive yourself home if you have received any anesthetic/sedation  Six days before surgery  Check your [facility] medication instructions  Do the exercises provided to you by [facility]   Start using Chlorhexidene (CHG) body wash if prescribed  Five days before surgery  Check your [facility] medication instructions  Do the exercises provided to you by [facility]   Continue to use CHG body wash if prescribed  Three days before surgery  Check your [facility] medication instructions  Do the exercises provided to you by [facility]   Continue to use CHG body wash if prescribed  Two days before surgery  Check your [facility] medication instructions  Do the exercises provided to you by [facility]   Continue to use CHG body wash if prescribed    The Day before Surgery       Check your [facility] medication and all other [facility] instructions including when to stop eating and drinking  You will be called with your arrival time for surgery in the late afternoon.  If you do not receive a call please reach out to your surgeon's office.  Do not smoke or drink 24 hours before surgery  Prepare items to bring with you to the hospital  Shower with your chlorhexidine wash if prescribed  Brush your teeth and use your chlorhexidine dental rinse if prescribed    The Day of Surgery       Check your [facility] medication instructions  Ensure you follow the instructions for when to stop eating and drinking  Shower, if prescribed use CHG.  Do not apply any lotions, creams, moisturizers, perfume or  deodorant  Brush your teeth and use your CHG dental rinse if prescribed  Wear loose comfortable clothing  Avoid make-up  Remove  jewelry and piercings, consider professional piercing removal with a plastic spacer if needed  Bring photo ID and Insurance card  Bring an accurate medication list that includes medication dose, frequency and allergies  Bring a copy of your advanced directives (will, health care power of )  Bring any devices and controllers as well as medical devices you have been provided with for surgery (CPAP, slings, braces, etc.)  Dentures, eyeglasses, and contacts will be removed before surgery, please bring cases for contacts or glasses             Medication List            Accurate as of September 20, 2024  1:31 PM. Always use your most recent med list.                acetaminophen 325 mg tablet  Commonly known as: Tylenol  Take 2 tablets (650 mg) by mouth every 6 hours if needed for mild pain (1 - 3) or moderate pain (4 - 6).  Medication Adjustments for Surgery: Take/Use as prescribed     amLODIPine 10 mg tablet  Commonly known as: Norvasc  1/2 tablet or 5 mg every day  Medication Adjustments for Surgery: Take/Use as prescribed     aspirin 81 mg chewable tablet  Medication Adjustments for Surgery: Take/Use as prescribed     atorvastatin 40 mg tablet  Commonly known as: Lipitor  Take 1 tablet (40 mg) by mouth once daily.  Medication Adjustments for Surgery: Take/Use as prescribed     baclofen 5 mg tablet  Commonly known as: Lioresal  Medication Adjustments for Surgery: Take/Use as prescribed     carvedilol 3.125 mg tablet  Commonly known as: Coreg  TAKE 1 TABLET BY MOUTH TWICE A DAY WITH FOOD  Medication Adjustments for Surgery: Take/Use as prescribed     Centrum Silver Men 743--300 mcg tablet  Generic drug: [id number]-lutein  Additional Medication Adjustments for Surgery: Take last dose 7 days before surgery     * chlorhexidine 0.12 % solution  Commonly known as:  Peridex  Use 15 mL in the mouth or throat if needed (Pre Op) for up to 14 days.     * chlorhexidine 4 % external liquid  Commonly known as: Hibiclens  Apply topically once daily as needed (Pre Op).     cholecalciferol 25 MCG (1000 UT) tablet  Commonly known as: Vitamin D-3  Additional Medication Adjustments for Surgery: Take last dose 7 days before surgery     citalopram 20 mg tablet  Commonly known as: CeleXA  TAKE 1 TABLET BY MOUTH EVERY DAY  Medication Adjustments for Surgery: Take/Use as prescribed     cyclobenzaprine 5 mg tablet  Commonly known as: Flexeril  Take 1 tablet (5 mg) by mouth 3 times a day.  Medication Adjustments for Surgery: Take/Use as prescribed     losartan 50 mg tablet  Commonly known as: Cozaar  TAKE 1 TABLET BY MOUTH EVERY DAY  Medication Adjustments for Surgery: Do Not take on the morning of surgery     polyethylene glycol 17 gram packet  Commonly known as: Glycolax, Miralax  Take 17 g by mouth once daily as needed (constipation).  Medication Adjustments for Surgery: Take/Use as prescribed     solifenacin 5 mg tablet  Commonly known as: VESIcare  Take 1 tablet (5 mg) by mouth once daily. Swallow tablet whole; do not crush, chew, or split.  Medication Adjustments for Surgery: Take/Use as prescribed           * This list has 2 medication(s) that are the same as other medications prescribed for you. Read the directions carefully, and ask your doctor or other care provider to review them with you.

## 2024-09-22 LAB — STAPHYLOCOCCUS SPEC CULT: NORMAL

## 2024-09-24 NOTE — PROGRESS NOTES
Pharmacy Medication History Review    Nasir Camacho is a 69 y.o. male who is planned to be admitted for No Principal Problem: There is no principal problem currently on the Problem List. Please update the Problem List and refresh.. Pharmacy called the patient prior to their scheduled procedure and reviewed the patient's [id number] medications for accuracy.    Medications ADDED:  none  Medications CHANGED:  Amlodipine 10mg directions from #1/2QD to #1QD  Medications REMOVED:   Baclofen 5mg  Paxlovid (therapy complete)    Please review updated prior to admission medication list and comments regarding how patient may be taking medications differently by going to Admission tab --> Admission Orders --> Admit Orders / Review prior to admission medications.     Preferred pharmacy and allergies to be confirmed with patient by nursing the day of procedure.     Sources used to complete the med history include spoke with patient and spouse, surescripts, oarrs, care everywhere    Below are additional concerns with the patient's PTA list.  Patient states they stopped losartan in preparation of procedure    Michelle Barber    [facility]  Please reach out via Secure Chat for questions

## 2024-09-25 ENCOUNTER — APPOINTMENT (OUTPATIENT)
Dept: RADIOLOGY | Facility: HOSPITAL | Age: 69
DRG: 522 | End: 2024-09-25
Payer: MEDICARE

## 2024-09-25 ENCOUNTER — HOSPITAL ENCOUNTER (INPATIENT)
Facility: HOSPITAL | Age: 69
LOS: 4 days | Discharge: NURSING, REHAB OR LONG-TERM CARE | DRG: 522 | End: 2024-09-29
Attending: ORTHOPAEDIC SURGERY | Admitting: ORTHOPAEDIC SURGERY
Payer: MEDICARE

## 2024-09-25 ENCOUNTER — ANESTHESIA (OUTPATIENT)
Dept: OPERATING ROOM | Facility: HOSPITAL | Age: 69
End: 2024-09-25
Payer: MEDICARE

## 2024-09-25 DIAGNOSIS — M16.51 POST-TRAUMATIC OSTEOARTHRITIS OF RIGHT HIP: Primary | ICD-10-CM

## 2024-09-25 DIAGNOSIS — G89.18 POSTOPERATIVE PAIN: ICD-10-CM

## 2024-09-25 PROBLEM — M16.9 HIP OSTEOARTHRITIS: Status: ACTIVE | Noted: 2024-09-25

## 2024-09-25 PROCEDURE — 2500000004 HC RX 250 GENERAL PHARMACY W/ HCPCS (ALT 636 FOR OP/ED): Performed by: ORTHOPAEDIC SURGERY

## 2024-09-25 PROCEDURE — 2500000004 HC RX 250 GENERAL PHARMACY W/ HCPCS (ALT 636 FOR OP/ED)

## 2024-09-25 PROCEDURE — 3700000002 HC GENERAL ANESTHESIA TIME - EACH INCREMENTAL 1 MINUTE: Performed by: ORTHOPAEDIC SURGERY

## 2024-09-25 PROCEDURE — 2500000005 HC RX 250 GENERAL PHARMACY W/O HCPCS

## 2024-09-25 PROCEDURE — 7100000011 HC EXTENDED STAY RECOVERY HOURLY - NURSING UNIT

## 2024-09-25 PROCEDURE — C1713 ANCHOR/SCREW BN/BN,TIS/BN: HCPCS | Performed by: ORTHOPAEDIC SURGERY

## 2024-09-25 PROCEDURE — 2780000003 HC OR 278 NO HCPCS: Performed by: ORTHOPAEDIC SURGERY

## 2024-09-25 PROCEDURE — A6213 FOAM DRG >16<=48 SQ IN W/BDR: HCPCS | Performed by: ORTHOPAEDIC SURGERY

## 2024-09-25 PROCEDURE — 0SR903Z REPLACEMENT OF RIGHT HIP JOINT WITH CERAMIC SYNTHETIC SUBSTITUTE, OPEN APPROACH: ICD-10-PCS | Performed by: ORTHOPAEDIC SURGERY

## 2024-09-25 PROCEDURE — P9045 ALBUMIN (HUMAN), 5%, 250 ML: HCPCS | Mod: JZ

## 2024-09-25 PROCEDURE — 2500000002 HC RX 250 W HCPCS SELF ADMINISTERED DRUGS (ALT 637 FOR MEDICARE OP, ALT 636 FOR OP/ED): Performed by: STUDENT IN AN ORGANIZED HEALTH CARE EDUCATION/TRAINING PROGRAM

## 2024-09-25 PROCEDURE — 27228 TREAT HIP FRACTURE(S): CPT | Performed by: ORTHOPAEDIC SURGERY

## 2024-09-25 PROCEDURE — 7100000001 HC RECOVERY ROOM TIME - INITIAL BASE CHARGE: Performed by: ORTHOPAEDIC SURGERY

## 2024-09-25 PROCEDURE — 1100000001 HC PRIVATE ROOM DAILY

## 2024-09-25 PROCEDURE — 3700000001 HC GENERAL ANESTHESIA TIME - INITIAL BASE CHARGE: Performed by: ORTHOPAEDIC SURGERY

## 2024-09-25 PROCEDURE — 7100000002 HC RECOVERY ROOM TIME - EACH INCREMENTAL 1 MINUTE: Performed by: ORTHOPAEDIC SURGERY

## 2024-09-25 PROCEDURE — 3600000017 HC OR TIME - EACH INCREMENTAL 1 MINUTE - PROCEDURE LEVEL SIX: Performed by: ORTHOPAEDIC SURGERY

## 2024-09-25 PROCEDURE — 72170 X-RAY EXAM OF PELVIS: CPT | Performed by: STUDENT IN AN ORGANIZED HEALTH CARE EDUCATION/TRAINING PROGRAM

## 2024-09-25 PROCEDURE — 72170 X-RAY EXAM OF PELVIS: CPT

## 2024-09-25 PROCEDURE — 3600000018 HC OR TIME - INITIAL BASE CHARGE - PROCEDURE LEVEL SIX: Performed by: ORTHOPAEDIC SURGERY

## 2024-09-25 PROCEDURE — 2500000004 HC RX 250 GENERAL PHARMACY W/ HCPCS (ALT 636 FOR OP/ED): Mod: JZ,JG | Performed by: STUDENT IN AN ORGANIZED HEALTH CARE EDUCATION/TRAINING PROGRAM

## 2024-09-25 PROCEDURE — 27130 TOTAL HIP ARTHROPLASTY: CPT | Performed by: ORTHOPAEDIC SURGERY

## 2024-09-25 PROCEDURE — 2500000004 HC RX 250 GENERAL PHARMACY W/ HCPCS (ALT 636 FOR OP/ED): Performed by: STUDENT IN AN ORGANIZED HEALTH CARE EDUCATION/TRAINING PROGRAM

## 2024-09-25 PROCEDURE — 2500000005 HC RX 250 GENERAL PHARMACY W/O HCPCS: Performed by: ORTHOPAEDIC SURGERY

## 2024-09-25 PROCEDURE — 0QS234Z REPOSITION RIGHT PELVIC BONE WITH INTERNAL FIXATION DEVICE, PERCUTANEOUS APPROACH: ICD-10-PCS | Performed by: ORTHOPAEDIC SURGERY

## 2024-09-25 PROCEDURE — 2500000001 HC RX 250 WO HCPCS SELF ADMINISTERED DRUGS (ALT 637 FOR MEDICARE OP): Performed by: STUDENT IN AN ORGANIZED HEALTH CARE EDUCATION/TRAINING PROGRAM

## 2024-09-25 PROCEDURE — C1776 JOINT DEVICE (IMPLANTABLE): HCPCS | Performed by: ORTHOPAEDIC SURGERY

## 2024-09-25 PROCEDURE — 20680 REMOVAL OF IMPLANT DEEP: CPT | Performed by: ORTHOPAEDIC SURGERY

## 2024-09-25 PROCEDURE — 2720000007 HC OR 272 NO HCPCS: Performed by: ORTHOPAEDIC SURGERY

## 2024-09-25 PROCEDURE — 0QP834Z REMOVAL OF INTERNAL FIXATION DEVICE FROM RIGHT FEMORAL SHAFT, PERCUTANEOUS APPROACH: ICD-10-PCS | Performed by: ORTHOPAEDIC SURGERY

## 2024-09-25 PROCEDURE — 72170 X-RAY EXAM OF PELVIS: CPT | Performed by: RADIOLOGY

## 2024-09-25 DEVICE — PINNACLE CANCELLOUS BONE SCREW 6.5MM X 20MM
Type: IMPLANTABLE DEVICE | Site: HIP | Status: FUNCTIONAL
Brand: PINNACLE

## 2024-09-25 DEVICE — ACTIS DUOFIX HIP PROSTHESIS (FEMORAL STEM 12/14 TAPER CEMENTLESS SIZE 5 STD COLLAR)  CE
Type: IMPLANTABLE DEVICE | Site: HIP | Status: FUNCTIONAL
Brand: ACTIS

## 2024-09-25 DEVICE — BIOLOX DELTA CERAMIC FEMORAL HEAD +1.5 36MM DIA 12/14 TAPER
Type: IMPLANTABLE DEVICE | Site: HIP | Status: FUNCTIONAL
Brand: BIOLOX DELTA

## 2024-09-25 DEVICE — PINNACLE CANCELLOUS BONE SCREW 6.5MM X 50MM
Type: IMPLANTABLE DEVICE | Site: HIP | Status: FUNCTIONAL
Brand: PINNACLE

## 2024-09-25 DEVICE — IMPLANTABLE DEVICE: Type: IMPLANTABLE DEVICE | Site: ACETABULUM | Status: FUNCTIONAL

## 2024-09-25 DEVICE — GUIDEWIRE, 2.8 W/FLUTES: Type: IMPLANTABLE DEVICE | Site: HIP | Status: NON-FUNCTIONAL

## 2024-09-25 DEVICE — PINNACLE CANCELLOUS BONE SCREW 6.5MM X 30MM
Type: IMPLANTABLE DEVICE | Site: HIP | Status: FUNCTIONAL
Brand: PINNACLE

## 2024-09-25 DEVICE — PINNACLE GRIPTION ACETABULAR SHELL MULTI-HOLE 60MM OD
Type: IMPLANTABLE DEVICE | Site: HIP | Status: FUNCTIONAL
Brand: PINNACLE GRIPTION

## 2024-09-25 DEVICE — PINNACLE HIP SOLUTIONS ALTRX POLYETHYLENE ACETABULAR LINER NEUTRAL 36MM ID 60MM OD
Type: IMPLANTABLE DEVICE | Site: HIP | Status: FUNCTIONAL
Brand: PINNACLE ALTRX

## 2024-09-25 RX ORDER — PHENYLEPHRINE HCL IN 0.9% NACL 0.4MG/10ML
SYRINGE (ML) INTRAVENOUS AS NEEDED
Status: DISCONTINUED | OUTPATIENT
Start: 2024-09-25 | End: 2024-09-25

## 2024-09-25 RX ORDER — PNV NO.95/FERROUS FUM/FOLIC AC 28MG-0.8MG
1 TABLET ORAL 2 TIMES DAILY
Qty: 60 TABLET | Refills: 11 | Status: SHIPPED | OUTPATIENT
Start: 2024-09-25 | End: 2024-09-28

## 2024-09-25 RX ORDER — ATORVASTATIN CALCIUM 40 MG/1
40 TABLET, FILM COATED ORAL DAILY
Status: DISCONTINUED | OUTPATIENT
Start: 2024-09-25 | End: 2024-09-29 | Stop reason: HOSPADM

## 2024-09-25 RX ORDER — BISACODYL 10 MG/1
10 SUPPOSITORY RECTAL DAILY PRN
Status: DISCONTINUED | OUTPATIENT
Start: 2024-09-25 | End: 2024-09-29 | Stop reason: HOSPADM

## 2024-09-25 RX ORDER — OXYCODONE HYDROCHLORIDE 5 MG/1
10 TABLET ORAL EVERY 4 HOURS PRN
Status: DISCONTINUED | OUTPATIENT
Start: 2024-09-25 | End: 2024-09-28

## 2024-09-25 RX ORDER — ROPIVACAINE HYDROCHLORIDE 5 MG/ML
INJECTION, SOLUTION EPIDURAL; INFILTRATION; PERINEURAL AS NEEDED
Status: DISCONTINUED | OUTPATIENT
Start: 2024-09-25 | End: 2024-09-25

## 2024-09-25 RX ORDER — OXYCODONE HYDROCHLORIDE 5 MG/1
5 TABLET ORAL EVERY 4 HOURS PRN
Status: DISCONTINUED | OUTPATIENT
Start: 2024-09-25 | End: 2024-09-25 | Stop reason: HOSPADM

## 2024-09-25 RX ORDER — TRANEXAMIC ACID 100 MG/ML
INJECTION, SOLUTION INTRAVENOUS AS NEEDED
Status: DISCONTINUED | OUTPATIENT
Start: 2024-09-25 | End: 2024-09-25

## 2024-09-25 RX ORDER — OXYBUTYNIN CHLORIDE 5 MG/1
5 TABLET ORAL 2 TIMES DAILY
Status: DISCONTINUED | OUTPATIENT
Start: 2024-09-25 | End: 2024-09-29 | Stop reason: HOSPADM

## 2024-09-25 RX ORDER — SODIUM CHLORIDE, SODIUM LACTATE, POTASSIUM CHLORIDE, CALCIUM CHLORIDE 600; 310; 30; 20 MG/100ML; MG/100ML; MG/100ML; MG/100ML
20 INJECTION, SOLUTION INTRAVENOUS CONTINUOUS
Status: DISCONTINUED | OUTPATIENT
Start: 2024-09-25 | End: 2024-09-29 | Stop reason: HOSPADM

## 2024-09-25 RX ORDER — HYDROMORPHONE HYDROCHLORIDE 0.2 MG/ML
0.2 INJECTION INTRAMUSCULAR; INTRAVENOUS; SUBCUTANEOUS EVERY 2 HOUR PRN
Status: DISCONTINUED | OUTPATIENT
Start: 2024-09-25 | End: 2024-09-29 | Stop reason: HOSPADM

## 2024-09-25 RX ORDER — BISACODYL 5 MG
10 TABLET, DELAYED RELEASE (ENTERIC COATED) ORAL DAILY PRN
Status: DISCONTINUED | OUTPATIENT
Start: 2024-09-25 | End: 2024-09-29 | Stop reason: HOSPADM

## 2024-09-25 RX ORDER — PROPOFOL 10 MG/ML
INJECTION, EMULSION INTRAVENOUS AS NEEDED
Status: DISCONTINUED | OUTPATIENT
Start: 2024-09-25 | End: 2024-09-25

## 2024-09-25 RX ORDER — OXYCODONE HYDROCHLORIDE 5 MG/1
5 TABLET ORAL EVERY 6 HOURS PRN
Qty: 28 TABLET | Refills: 0 | Status: SHIPPED | OUTPATIENT
Start: 2024-09-25 | End: 2024-09-28

## 2024-09-25 RX ORDER — ROCURONIUM BROMIDE 10 MG/ML
INJECTION, SOLUTION INTRAVENOUS AS NEEDED
Status: DISCONTINUED | OUTPATIENT
Start: 2024-09-25 | End: 2024-09-25

## 2024-09-25 RX ORDER — DEXMEDETOMIDINE IN 0.9 % NACL 20 MCG/5ML
SYRINGE (ML) INTRAVENOUS AS NEEDED
Status: DISCONTINUED | OUTPATIENT
Start: 2024-09-25 | End: 2024-09-25

## 2024-09-25 RX ORDER — LIDOCAINE HYDROCHLORIDE 10 MG/ML
0.1 INJECTION, SOLUTION INFILTRATION; PERINEURAL ONCE
Status: DISCONTINUED | OUTPATIENT
Start: 2024-09-25 | End: 2024-09-25 | Stop reason: HOSPADM

## 2024-09-25 RX ORDER — ASPIRIN 81 MG/1
81 TABLET ORAL 2 TIMES DAILY
Qty: 84 TABLET | Refills: 0 | Status: SHIPPED | OUTPATIENT
Start: 2024-09-25 | End: 2024-09-28 | Stop reason: HOSPADM

## 2024-09-25 RX ORDER — CEFAZOLIN 1 G/1
INJECTION, POWDER, FOR SOLUTION INTRAVENOUS AS NEEDED
Status: DISCONTINUED | OUTPATIENT
Start: 2024-09-25 | End: 2024-09-25

## 2024-09-25 RX ORDER — HYDROMORPHONE HYDROCHLORIDE 1 MG/ML
0.2 INJECTION, SOLUTION INTRAMUSCULAR; INTRAVENOUS; SUBCUTANEOUS EVERY 5 MIN PRN
Status: DISCONTINUED | OUTPATIENT
Start: 2024-09-25 | End: 2024-09-25 | Stop reason: HOSPADM

## 2024-09-25 RX ORDER — OXYCODONE HYDROCHLORIDE 5 MG/1
10 TABLET ORAL EVERY 4 HOURS PRN
Status: DISCONTINUED | OUTPATIENT
Start: 2024-09-25 | End: 2024-09-25 | Stop reason: HOSPADM

## 2024-09-25 RX ORDER — ACETAMINOPHEN 325 MG/1
650 TABLET ORAL EVERY 6 HOURS PRN
Qty: 60 TABLET | Refills: 3 | Status: SHIPPED | OUTPATIENT
Start: 2024-09-25 | End: 2024-09-28

## 2024-09-25 RX ORDER — CITALOPRAM 20 MG/1
20 TABLET, FILM COATED ORAL DAILY
Status: DISCONTINUED | OUTPATIENT
Start: 2024-09-25 | End: 2024-09-29 | Stop reason: HOSPADM

## 2024-09-25 RX ORDER — ALBUMIN HUMAN 50 G/1000ML
SOLUTION INTRAVENOUS AS NEEDED
Status: DISCONTINUED | OUTPATIENT
Start: 2024-09-25 | End: 2024-09-25

## 2024-09-25 RX ORDER — ONDANSETRON HYDROCHLORIDE 2 MG/ML
4 INJECTION, SOLUTION INTRAVENOUS ONCE AS NEEDED
Status: DISCONTINUED | OUTPATIENT
Start: 2024-09-25 | End: 2024-09-25 | Stop reason: HOSPADM

## 2024-09-25 RX ORDER — NALOXONE HYDROCHLORIDE 0.4 MG/ML
0.2 INJECTION, SOLUTION INTRAMUSCULAR; INTRAVENOUS; SUBCUTANEOUS EVERY 5 MIN PRN
Status: DISCONTINUED | OUTPATIENT
Start: 2024-09-25 | End: 2024-09-29 | Stop reason: HOSPADM

## 2024-09-25 RX ORDER — SODIUM CHLORIDE 0.9 G/100ML
IRRIGANT IRRIGATION AS NEEDED
Status: DISCONTINUED | OUTPATIENT
Start: 2024-09-25 | End: 2024-09-25 | Stop reason: HOSPADM

## 2024-09-25 RX ORDER — METHOCARBAMOL 100 MG/ML
1000 INJECTION, SOLUTION INTRAMUSCULAR; INTRAVENOUS ONCE
Status: COMPLETED | OUTPATIENT
Start: 2024-09-25 | End: 2024-09-25

## 2024-09-25 RX ORDER — LIDOCAINE HCL/PF 100 MG/5ML
SYRINGE (ML) INTRAVENOUS AS NEEDED
Status: DISCONTINUED | OUTPATIENT
Start: 2024-09-25 | End: 2024-09-25

## 2024-09-25 RX ORDER — ONDANSETRON HYDROCHLORIDE 2 MG/ML
INJECTION, SOLUTION INTRAVENOUS AS NEEDED
Status: DISCONTINUED | OUTPATIENT
Start: 2024-09-25 | End: 2024-09-25

## 2024-09-25 RX ORDER — FENTANYL CITRATE 50 UG/ML
INJECTION, SOLUTION INTRAMUSCULAR; INTRAVENOUS AS NEEDED
Status: DISCONTINUED | OUTPATIENT
Start: 2024-09-25 | End: 2024-09-25

## 2024-09-25 RX ORDER — HYDROMORPHONE HYDROCHLORIDE 1 MG/ML
INJECTION, SOLUTION INTRAMUSCULAR; INTRAVENOUS; SUBCUTANEOUS AS NEEDED
Status: DISCONTINUED | OUTPATIENT
Start: 2024-09-25 | End: 2024-09-25

## 2024-09-25 RX ORDER — CARVEDILOL 6.25 MG/1
3.12 TABLET ORAL 2 TIMES DAILY
Status: DISCONTINUED | OUTPATIENT
Start: 2024-09-25 | End: 2024-09-29 | Stop reason: HOSPADM

## 2024-09-25 RX ORDER — DOCUSATE SODIUM 100 MG/1
100 CAPSULE, LIQUID FILLED ORAL 2 TIMES DAILY
Qty: 60 CAPSULE | Refills: 2 | Status: SHIPPED | OUTPATIENT
Start: 2024-09-25 | End: 2024-09-28

## 2024-09-25 RX ORDER — NORETHINDRONE AND ETHINYL ESTRADIOL 0.5-0.035
KIT ORAL AS NEEDED
Status: DISCONTINUED | OUTPATIENT
Start: 2024-09-25 | End: 2024-09-25

## 2024-09-25 RX ORDER — CEFAZOLIN SODIUM 2 G/100ML
2 INJECTION, SOLUTION INTRAVENOUS EVERY 8 HOURS
Status: COMPLETED | OUTPATIENT
Start: 2024-09-25 | End: 2024-09-26

## 2024-09-25 RX ORDER — METOPROLOL TARTRATE 1 MG/ML
INJECTION, SOLUTION INTRAVENOUS AS NEEDED
Status: DISCONTINUED | OUTPATIENT
Start: 2024-09-25 | End: 2024-09-25

## 2024-09-25 RX ORDER — SODIUM CHLORIDE, SODIUM LACTATE, POTASSIUM CHLORIDE, CALCIUM CHLORIDE 600; 310; 30; 20 MG/100ML; MG/100ML; MG/100ML; MG/100ML
100 INJECTION, SOLUTION INTRAVENOUS CONTINUOUS
Status: DISCONTINUED | OUTPATIENT
Start: 2024-09-25 | End: 2024-09-29 | Stop reason: HOSPADM

## 2024-09-25 RX ORDER — ASPIRIN 81 MG/1
81 TABLET ORAL 2 TIMES DAILY
Status: DISCONTINUED | OUTPATIENT
Start: 2024-09-25 | End: 2024-09-28

## 2024-09-25 RX ORDER — HYDROMORPHONE HYDROCHLORIDE 1 MG/ML
0.5 INJECTION, SOLUTION INTRAMUSCULAR; INTRAVENOUS; SUBCUTANEOUS EVERY 5 MIN PRN
Status: DISCONTINUED | OUTPATIENT
Start: 2024-09-25 | End: 2024-09-25 | Stop reason: HOSPADM

## 2024-09-25 RX ORDER — PHENYLEPHRINE 10 MG/250 ML(40 MCG/ML)IN 0.9 % SOD.CHLORIDE INTRAVENOUS
CONTINUOUS PRN
Status: DISCONTINUED | OUTPATIENT
Start: 2024-09-25 | End: 2024-09-25

## 2024-09-25 RX ORDER — VANCOMYCIN HYDROCHLORIDE 1 G/20ML
INJECTION, POWDER, LYOPHILIZED, FOR SOLUTION INTRAVENOUS AS NEEDED
Status: DISCONTINUED | OUTPATIENT
Start: 2024-09-25 | End: 2024-09-25 | Stop reason: HOSPADM

## 2024-09-25 RX ORDER — LABETALOL HYDROCHLORIDE 5 MG/ML
5 INJECTION, SOLUTION INTRAVENOUS ONCE AS NEEDED
Status: DISCONTINUED | OUTPATIENT
Start: 2024-09-25 | End: 2024-09-25 | Stop reason: HOSPADM

## 2024-09-25 RX ORDER — AMLODIPINE BESYLATE 10 MG/1
10 TABLET ORAL DAILY
Status: DISCONTINUED | OUTPATIENT
Start: 2024-09-25 | End: 2024-09-29 | Stop reason: HOSPADM

## 2024-09-25 RX ORDER — ACETAMINOPHEN 325 MG/1
650 TABLET ORAL EVERY 6 HOURS SCHEDULED
Status: DISCONTINUED | OUTPATIENT
Start: 2024-09-25 | End: 2024-09-29 | Stop reason: HOSPADM

## 2024-09-25 RX ORDER — POLYETHYLENE GLYCOL 3350 17 G/17G
17 POWDER, FOR SOLUTION ORAL DAILY
Status: DISCONTINUED | OUTPATIENT
Start: 2024-09-25 | End: 2024-09-29 | Stop reason: HOSPADM

## 2024-09-25 RX ORDER — OXYCODONE HYDROCHLORIDE 5 MG/1
5 TABLET ORAL EVERY 6 HOURS PRN
Status: DISCONTINUED | OUTPATIENT
Start: 2024-09-25 | End: 2024-09-28

## 2024-09-25 SDOH — ECONOMIC STABILITY: INCOME INSECURITY: HOW HARD IS IT FOR YOU TO PAY FOR THE VERY BASICS LIKE FOOD, HOUSING, MEDICAL CARE, AND HEATING?: PATIENT DECLINED

## 2024-09-25 SDOH — SOCIAL STABILITY: SOCIAL INSECURITY: DO YOU FEEL ANYONE HAS EXPLOITED OR TAKEN ADVANTAGE OF YOU FINANCIALLY OR OF YOUR PERSONAL PROPERTY?: NO

## 2024-09-25 SDOH — SOCIAL STABILITY: SOCIAL INSECURITY: ABUSE: ADULT

## 2024-09-25 SDOH — SOCIAL STABILITY: SOCIAL INSECURITY: WERE YOU ABLE TO COMPLETE ALL THE BEHAVIORAL HEALTH SCREENINGS?: YES

## 2024-09-25 SDOH — SOCIAL STABILITY: SOCIAL INSECURITY: ARE THERE ANY APPARENT SIGNS OF INJURIES/BEHAVIORS THAT COULD BE RELATED TO ABUSE/NEGLECT?: NO

## 2024-09-25 SDOH — ECONOMIC STABILITY: TRANSPORTATION INSECURITY
IN THE PAST 12 MONTHS, HAS THE LACK OF TRANSPORTATION KEPT YOU FROM MEDICAL APPOINTMENTS OR FROM GETTING MEDICATIONS?: NO

## 2024-09-25 SDOH — SOCIAL STABILITY: SOCIAL INSECURITY
WITHIN THE LAST YEAR, HAVE TO BEEN RAPED OR FORCED TO HAVE ANY KIND OF SEXUAL ACTIVITY BY YOUR PARTNER OR EX-PARTNER?: NO

## 2024-09-25 SDOH — ECONOMIC STABILITY: FOOD INSECURITY: WITHIN THE PAST 12 MONTHS, YOU WORRIED THAT YOUR FOOD WOULD RUN OUT BEFORE YOU GOT MONEY TO BUY MORE.: PATIENT DECLINED

## 2024-09-25 SDOH — SOCIAL STABILITY: SOCIAL INSECURITY: DOES ANYONE TRY TO KEEP YOU FROM HAVING/CONTACTING OTHER FRIENDS OR DOING THINGS OUTSIDE YOUR HOME?: NO

## 2024-09-25 SDOH — SOCIAL STABILITY: SOCIAL INSECURITY: WITHIN THE LAST YEAR, HAVE YOU BEEN AFRAID OF YOUR PARTNER OR EX-PARTNER?: NO

## 2024-09-25 SDOH — SOCIAL STABILITY: SOCIAL INSECURITY: WITHIN THE LAST YEAR, HAVE YOU BEEN HUMILIATED OR EMOTIONALLY ABUSED IN OTHER WAYS BY YOUR PARTNER OR EX-PARTNER?: NO

## 2024-09-25 SDOH — HEALTH STABILITY: MENTAL HEALTH: CURRENT SMOKER: 0

## 2024-09-25 SDOH — SOCIAL STABILITY: SOCIAL INSECURITY
WITHIN THE LAST YEAR, HAVE YOU BEEN KICKED, HIT, SLAPPED, OR OTHERWISE PHYSICALLY HURT BY YOUR PARTNER OR EX-PARTNER?: NO

## 2024-09-25 SDOH — ECONOMIC STABILITY: TRANSPORTATION INSECURITY
IN THE PAST 12 MONTHS, HAS LACK OF TRANSPORTATION KEPT YOU FROM MEETINGS, WORK, OR FROM GETTING THINGS NEEDED FOR DAILY LIVING?: NO

## 2024-09-25 SDOH — SOCIAL STABILITY: SOCIAL INSECURITY: HAVE YOU HAD ANY THOUGHTS OF HARMING ANYONE ELSE?: NO

## 2024-09-25 SDOH — ECONOMIC STABILITY: HOUSING INSECURITY: AT ANY TIME IN THE PAST 12 MONTHS, WERE YOU HOMELESS OR LIVING IN A SHELTER (INCLUDING NOW)?: NO

## 2024-09-25 SDOH — SOCIAL STABILITY: SOCIAL INSECURITY: HAVE YOU HAD THOUGHTS OF HARMING ANYONE ELSE?: NO

## 2024-09-25 SDOH — ECONOMIC STABILITY: INCOME INSECURITY
IN THE PAST 12 MONTHS, HAS THE ELECTRIC, GAS, OIL, OR WATER COMPANY THREATENED TO SHUT OFF SERVICE IN YOUR HOME?: PATIENT DECLINED

## 2024-09-25 SDOH — SOCIAL STABILITY: SOCIAL INSECURITY: DO YOU FEEL UNSAFE GOING BACK TO THE PLACE WHERE YOU ARE LIVING?: NO

## 2024-09-25 SDOH — ECONOMIC STABILITY: HOUSING INSECURITY: IN THE PAST 12 MONTHS, HOW MANY TIMES HAVE YOU MOVED WHERE YOU WERE LIVING?: 0

## 2024-09-25 SDOH — SOCIAL STABILITY: SOCIAL INSECURITY: ARE YOU OR HAVE YOU BEEN THREATENED OR ABUSED PHYSICALLY, EMOTIONALLY, OR SEXUALLY BY ANYONE?: NO

## 2024-09-25 SDOH — ECONOMIC STABILITY: INCOME INSECURITY: IN THE LAST 12 MONTHS, WAS THERE A TIME WHEN YOU WERE NOT ABLE TO PAY THE MORTGAGE OR RENT ON TIME?: PATIENT DECLINED

## 2024-09-25 SDOH — ECONOMIC STABILITY: FOOD INSECURITY: WITHIN THE PAST 12 MONTHS, THE FOOD YOU BOUGHT JUST DIDN'T LAST AND YOU DIDN'T HAVE MONEY TO GET MORE.: PATIENT DECLINED

## 2024-09-25 SDOH — SOCIAL STABILITY: SOCIAL INSECURITY: HAS ANYONE EVER THREATENED TO HURT YOUR FAMILY OR YOUR PETS?: NO

## 2024-09-25 ASSESSMENT — ACTIVITIES OF DAILY LIVING (ADL)
FEEDING YOURSELF: INDEPENDENT
PATIENT'S MEMORY ADEQUATE TO SAFELY COMPLETE DAILY ACTIVITIES?: YES
TOILETING: INDEPENDENT
ASSISTIVE_DEVICE: EYEGLASSES
HEARING - LEFT EAR: DIFFICULTY WITH NOISE
DRESSING YOURSELF: INDEPENDENT
HEARING - RIGHT EAR: DIFFICULTY WITH NOISE
BATHING: INDEPENDENT
JUDGMENT_ADEQUATE_SAFELY_COMPLETE_DAILY_ACTIVITIES: YES
GROOMING: INDEPENDENT
LACK_OF_TRANSPORTATION: PATIENT DECLINED
WALKS IN HOME: INDEPENDENT
ADEQUATE_TO_COMPLETE_ADL: YES

## 2024-09-25 ASSESSMENT — LIFESTYLE VARIABLES
HOW OFTEN DO YOU HAVE 6 OR MORE DRINKS ON ONE OCCASION: NEVER
AUDIT-C TOTAL SCORE: 3
HOW OFTEN DO YOU HAVE A DRINK CONTAINING ALCOHOL: 2-3 TIMES A WEEK
AUDIT-C TOTAL SCORE: 3
SKIP TO QUESTIONS 9-10: 1
HOW MANY STANDARD DRINKS CONTAINING ALCOHOL DO YOU HAVE ON A TYPICAL DAY: 1 OR 2

## 2024-09-25 ASSESSMENT — PAIN SCALES - GENERAL
PAINLEVEL_OUTOF10: 2
PAINLEVEL_OUTOF10: 2
PAINLEVEL_OUTOF10: 0 - NO PAIN
PAINLEVEL_OUTOF10: 2
PAINLEVEL_OUTOF10: 2
PAIN_LEVEL: 0
PAINLEVEL_OUTOF10: 3
PAINLEVEL_OUTOF10: 0 - NO PAIN
PAINLEVEL_OUTOF10: 2
PAINLEVEL_OUTOF10: 3
PAINLEVEL_OUTOF10: 0 - NO PAIN
PAINLEVEL_OUTOF10: 3

## 2024-09-25 ASSESSMENT — PAIN - FUNCTIONAL ASSESSMENT
PAIN_FUNCTIONAL_ASSESSMENT: 0-10

## 2024-09-25 ASSESSMENT — COLUMBIA-SUICIDE SEVERITY RATING SCALE - C-SSRS
6. HAVE YOU EVER DONE ANYTHING, STARTED TO DO ANYTHING, OR PREPARED TO DO ANYTHING TO END YOUR LIFE?: NO
1. IN THE PAST MONTH, HAVE YOU WISHED YOU WERE DEAD OR WISHED YOU COULD GO TO SLEEP AND NOT WAKE UP?: NO
6. HAVE YOU EVER DONE ANYTHING, STARTED TO DO ANYTHING, OR PREPARED TO DO ANYTHING TO END YOUR LIFE?: NO
2. HAVE YOU ACTUALLY HAD ANY THOUGHTS OF KILLING YOURSELF?: NO
1. IN THE PAST MONTH, HAVE YOU WISHED YOU WERE DEAD OR WISHED YOU COULD GO TO SLEEP AND NOT WAKE UP?: NO
2. HAVE YOU ACTUALLY HAD ANY THOUGHTS OF KILLING YOURSELF?: NO

## 2024-09-25 ASSESSMENT — COGNITIVE AND FUNCTIONAL STATUS - GENERAL
PATIENT BASELINE BEDBOUND: NO
HELP NEEDED FOR BATHING: A LITTLE
DAILY ACTIVITIY SCORE: 18
CLIMB 3 TO 5 STEPS WITH RAILING: A LOT
TURNING FROM BACK TO SIDE WHILE IN FLAT BAD: A LITTLE
DRESSING REGULAR LOWER BODY CLOTHING: A LITTLE
DRESSING REGULAR UPPER BODY CLOTHING: A LITTLE
PERSONAL GROOMING: A LITTLE
MOVING TO AND FROM BED TO CHAIR: A LOT
TOILETING: A LITTLE
STANDING UP FROM CHAIR USING ARMS: A LOT
MOBILITY SCORE: 14
EATING MEALS: A LITTLE
MOVING FROM LYING ON BACK TO SITTING ON SIDE OF FLAT BED WITH BEDRAILS: A LITTLE
WALKING IN HOSPITAL ROOM: A LOT

## 2024-09-25 ASSESSMENT — PATIENT HEALTH QUESTIONNAIRE - PHQ9
SUM OF ALL RESPONSES TO PHQ9 QUESTIONS 1 & 2: 0
1. LITTLE INTEREST OR PLEASURE IN DOING THINGS: NOT AT ALL
2. FEELING DOWN, DEPRESSED OR HOPELESS: NOT AT ALL

## 2024-09-25 NOTE — DISCHARGE INSTRUCTIONS
Follow-Up Instructions  You will need to be seen in clinic by Dr. Kerr in 2 weeks for a post-operative evaluation.  This appointment will be in the outpatient surgical specialty services [place], on the campus of [facility]    You will need to call and schedule an appointment, unless there is a previous appointment that appears on your discharge instructions.  The direct orthopaedic clinic appointment line phone number is [phone].  Please do not delay in calling to make this appointment.    You should also follow up with your primary care provider in 1-2 weeks.    Activity Restrictions  1) No driving until further instructed by your orthopaedic physician, which will be addressed at your outpatient appointments.    2) No driving or operating heavy machinery while taking narcotic pain medication.    3) Weight bearing status --> weight bearing as tolerated right lower extremity.     Wound care instructions:   1) Leave operative dressing in place until postoperative day 7. Then remove and leave incision open to air. Let water run freely over incision when showering, do not scrub. Do not soak in pool or tub.    2) Call if any drainage after 7 days, increased redness/warmth/swelling at incision site, abnormal pain/tenderness of the extremity, abnormal swelling of the extremity that does not respond to elevation, SOB/chest pain. Do not swim in pools or ponds until 3 months after surgery.

## 2024-09-25 NOTE — ANESTHESIA POSTPROCEDURE EVALUATION
Patient: Nasir Camacho    Procedure Summary       Date: 09/25/24 Room / Location: [facility] OR 07 / [facility] OR    Anesthesia Start: 1111 Anesthesia Stop: 1524    Procedures:       Arthroplasty Total Hip Anterior Approach (Right: Hip)      Removal Hardware Pelvis (Right: Pelvis)      Pelvic Fracture Percutaneous Fixation (Right: Pelvis) Diagnosis:       Closed displaced combined transverse-posterior fracture of right acetabulum, initial encounter (Multi)      Post-traumatic osteoarthritis of right hip      Presence of retained hardware      (Closed displaced combined transverse-posterior fracture of right acetabulum, initial encounter (Multi) [S32.461A])      (Post-traumatic osteoarthritis of right hip [M16.51])      (Presence of retained hardware [Z96.9])    Surgeons: Rico Kerr MD Responsible Provider: Shaunna Mendoza MD    Anesthesia Type: general ASA Status: 3            Anesthesia Type: general    Vitals Value Taken Time   /59 09/25/24 1524   Temp 36.0 09/25/24 1524   Pulse 73 09/25/24 1522   Resp 15 09/25/24 1522   SpO2 100 % 09/25/24 1522   Vitals shown include unfiled device data.    Anesthesia Post Evaluation    Patient location during evaluation: PACU  Patient participation: complete - patient participated  Level of consciousness: sleepy but conscious  Pain score: 0  Pain management: adequate  Multimodal analgesia pain management approach  Airway patency: patent  Two or more strategies used to mitigate risk of obstructive sleep apnea  Cardiovascular status: acceptable, blood pressure returned to baseline and hemodynamically stable  Respiratory status: acceptable  Hydration status: acceptable  Postoperative Nausea and Vomiting: none        No notable events documented.

## 2024-09-25 NOTE — ANESTHESIA PROCEDURE NOTES
Airway  Date/Time: 9/25/2024 11:26 AM  Urgency: elective    Airway not difficult    Staffing  Performed: SRNA   Authorized by: Shira Jenkins MD    Performed by: Ashlyn Alvarez  Patient location during procedure: OR    Indications and Patient Condition  Indications for airway management: anesthesia and airway protection  Spontaneous Ventilation: absent  Sedation level: deep  Preoxygenated: yes  Patient position: sniffing  Mask difficulty assessment: 2 - vent by mask + OA or adjuvant +/- NMBA    Final Airway Details  Final airway type: endotracheal airway      Successful airway: ETT  Cuffed: yes   Successful intubation technique: direct laryngoscopy  Facilitating devices/methods: intubating stylet  Endotracheal tube insertion site: oral  Blade: Katie  Blade size: #4  ETT size (mm): 7.5  Cormack-Lehane Classification: grade I - full view of glottis  Placement verified by: chest auscultation and capnometry   Measured from: teeth  ETT to teeth (cm): 22  Number of attempts at approach: 1    Additional Comments  Atraumatic. Lips and teeth in pre-anesthetic condition.

## 2024-09-25 NOTE — PERIOPERATIVE NURSING NOTE
1517 Pt arrived to PACU, alert and able to answer questions and follow commands. Pt denies pain and discomfort, no distress noted. Will continue to monitor.     1605 X-ray at bedside. Ortho paged.     1630 Pt x-ray looks good and ok to go to floor.     1635 Report called to nurse on [address]. Pt stable.     1730 Transport requested for pt. Pt stable.     1740 Pt in route to room on [address] with all personal belongings. . Pt stable.       Viri Cordoba RN

## 2024-09-25 NOTE — ANESTHESIA PROCEDURE NOTES
Peripheral IV  Date/Time: 9/25/2024 11:28 AM      Placement  Needle size: 18 G  Laterality: right  Location: hand  Local anesthetic: none  Site prep: alcohol  Technique: anatomical landmarks  Attempts: 1

## 2024-09-25 NOTE — BRIEF OP NOTE
Date: 2024  OR Location: [facility] OR    Name: Nasir Camacho, : 1955, Age: 69 y.o., MRN: [id number], Sex: male    Diagnosis  Pre-op Diagnosis      * Closed displaced combined transverse-posterior fracture of right acetabulum, initial encounter (Multi) [S32.461A]     * Post-traumatic osteoarthritis of right hip [M16.51]     * Presence of retained hardware [Z96.9] Post-op Diagnosis     * Closed displaced combined transverse-posterior fracture of right acetabulum, initial encounter (Multi) [S32.461A]     * Post-traumatic osteoarthritis of right hip [M16.51]     * Presence of retained hardware [Z96.9]     Procedures  Arthroplasty Total Hip Anterior Approach  92396 - WV ARTHRP ACETBLR/PROX FEM PROSTC AGRFT/ALGRFT    Removal Hardware Pelvis    Pelvic Fracture Percutaneous Fixation  38065 - WV PERQ SKELETAL FIXATION PST PELVIC BONE FX&/DIS    WV REMOVAL IMPLANT DEEP []  WV OPTX ACTBLR FX INVG ANT&POST 2 COLUMNS FX W/INT [57224]  Surgeons      * Rico Kerr - Primary    Resident/Fellow/Other Assistant:  Surgeons and Role:     * Shaun Laird DO - Resident - Assisting     * Ivory Mederos PA-C - [facility] First Assist    Procedure Summary  Anesthesia: General  ASA: III  Anesthesia Staff: Anesthesiologist: Shaunna Mendoza MD; Shira Jenkins MD  CRNA: JESSE Mary  Anesthesia Resident: Jeni Coffman MD  SRNA: Ashlyn Alvarez  Frontline Breaker: JESSE Washington  Estimated Blood Loss: 100mL  Intra-op Medications:   Administrations occurring from 0955 to 1230 on 24:   Medication Name Total Dose   sodium chloride 0.9 % irrigation solution 1,000 mL   lactated Ringer's infusion Cannot be calculated              Anesthesia Record               Intraprocedure I/O Totals          Intake    Tranexamic Acid 0.00 mL    The total shown is the total volume documented since Anesthesia Start was filed.    Phenylephrine Drip 0.00 mL    The total shown is the total volume documented since  Anesthesia Start was filed.    lactated Ringer's infusion 500.00 mL    Total Intake 500 mL       Output    Urine 170 mL    Est. Blood Loss 100 mL    Total Output 270 mL       Net    Net Volume 230 mL          Specimen: No specimens collected     Staff:   Circulator: Merari  Scrub Person: Dorothy Jainub Person: Nancy Hernandez Circulator: Tico Hernandez Scrub: Tico          Findings: right transverse acetabular fracture, right hip OA    Complications:  None; patient tolerated the procedure well.     Disposition: PACU - hemodynamically stable.  Condition: stable  Specimens Collected: No specimens collected  Attending Attestation: I was present and scrubbed for the entire procedure.    Rico Kerr  Phone Number: [phone]

## 2024-09-25 NOTE — ANESTHESIA PREPROCEDURE EVALUATION
Patient: Nasir Camacho    Procedure Information       Date/Time: 09/25/24 0955    Procedure: Arthroplasty Total Hip Anterior Approach, percutaneous acetabular fixation and retrograde femoral nail removal (Right: Hip)    Location: [facility] OR 07 / [facility]    Surgeons: Rico Kerr MD          69M with PMH of HTN, CAD, NSTEMI s/p LAD PCI 1999, aortic stenosis s/p TAVR, CVA with residual right-sided weakness presenting for anterior SURI/acetabular fixation/femoral nail removal with Dr. Kerr.       Relevant Problems   Cardiac   (+) Accelerated hypertension   (+) Essential hypertension   (+) Hypercholesterolemia      Neuro   (+) Depression   (+) ICH (intracerebral hemorrhage) (Multi)   (+) Stroke (Multi)      /Renal   (+) Nephrolithiasis      Endocrine   (+) Hyperthyroidism      Musculoskeletal   (+) Post-traumatic osteoarthritis of right hip       Clinical information reviewed:   Tobacco  Allergies  Meds   Med Hx  Surg Hx   Fam Hx  Soc Hx        NPO Detail:  NPO/Void Status  Date of Last Liquid: 09/25/24  Time of Last Liquid: 0645  Date of Last Solid: 09/24/24  Time of Last Solid: 1900  Last Intake Type: Clear fluids         Physical Exam    Airway  Mallampati: II  TM distance: >3 FB  Neck ROM: full     Cardiovascular   Rhythm: regular  Rate: normal     Dental    Pulmonary   Breath sounds clear to auscultation     Abdominal      Other findings: RUE and RLE weakness compared to L, 2/2 CVA  Preop BP elevated to 188/81, 192/87 on repeat (compared to /56) - patient denies HA, blurry vision, dizziness, lightheadedness, chest pain, SOB. States he stopped his losartan yesterday per preop instructions.          Anesthesia Plan    History of general anesthesia?: yes  History of complications of general anesthesia?: no    ASA 3     general   (Preop nerve block planned)  The patient is not a current smoker.    intravenous induction   Postoperative administration of opioids is  intended.  Anesthetic plan and risks discussed with patient and spouse.  Use of blood products discussed with patient and spouse who consented to blood products.    Plan discussed with attending and CRNA.

## 2024-09-25 NOTE — CONSULTS
Nasir Camacho is a 69 y.o. year old male patient who presents for arthroplasty total hip, percutaneous acetabular fixation and retrograde femoral nail removal with Dr. Kerr on 9/25. Acute Pain consulted for block for postoperative pain control.     Anticipated Postop Pain Issues -   Palliative: typically relieved with IV analgesics and regional local anesthetics  Provocative: typically with movement  Quality: typically burning and aching  Radiation: typically none  Severity: typically severe 8-10/10  Timing: typically constant    Past Medical History:   Diagnosis Date    Aortic stenosis     s/p TAVR 6/29/21    BRCA positive     Chronic pain disorder     Coronary artery disease     COVID-19 09/23/2022    Displaced fracture of right acetabulum (Multi)     Graves disease     Hip fracture (Multi)     Hyperlipidemia     Hypertension     Hyperthyroidism     Myocardial infarction (Multi) 1999    acute plaque rupture in the LAD    Pelvic fracture (Multi)     Pulmonary nodule     stable    Stroke (Multi) 05/2016    hemorrhagic stroke with right hemiparesis in 2016        Past Surgical History:   Procedure Laterality Date    ANKLE SURGERY      CARDIAC CATHETERIZATION      CARDIAC VALVE REPLACEMENT  06/29/2021    transcatheter aortic valve replacement    CORONARY ANGIOPLASTY WITH STENT PLACEMENT  10/06/2014    Cath Stent Placement    CT ANGIO NECK  07/18/2021    CT NECK ANGIO W AND WO IV CONTRAST 7/18/2021 [facility]    CT HEAD ANGIO W AND WO IV CONTRAST  07/18/2021    CT HEAD ANGIO W AND WO IV CONTRAST 7/18/2021 [facility]    FEMUR FRACTURE SURGERY  11/06/2014    Femur Repair        No family history on file.     Social History     Socioeconomic History    Marital status:      Spouse name: Not on file    Number of children: Not on file    Years of education: Not on file    Highest education level: Not on file   Occupational History    Not on file   Tobacco Use    Smoking status: Never     Smokeless tobacco: Never   Substance and Sexual Activity    Alcohol use: Yes     Comment: one drink weekly    Drug use: Never    Sexual activity: Not on file   Other Topics Concern    Not on file   Social History Narrative    Not on file     Social Determinants of Health     Financial Resource Strain: Low Risk  (6/24/2024)    Overall Financial Resource Strain (CARDIA)     Difficulty of Paying Living Expenses: Not hard at all   Food Insecurity: Not on File (2/3/2021)    Received from [facility]    Food Insecurity     Food: 0   Transportation Needs: No Transportation Needs (9/10/2024)    OASIS : Transportation     Lack of Transportation (Medical): No     Lack of Transportation (Non-Medical): No     Patient Unable or Declines to Respond: No   Physical Activity: Not on File (2/3/2021)    Received from [facility]    Physical Activity     Physical Activity: 0   Stress: Not on File (2/3/2021)    Received from [facility]    Stress     Stress: 0   Social Connections: Feeling Socially Integrated (9/10/2024)    OASIS : Social Isolation     Frequency of experiencing loneliness or isolation: Never   Intimate Partner Violence: Not on file   Housing Stability: Low Risk  (6/24/2024)    Housing Stability Vital Sign     Unable to Pay for Housing in the Last Year: No     Number of Places Lived in the Last Year: 1     Unstable Housing in the Last Year: No        Allergies   Allergen Reactions    Penicillin Unknown         Review of Systems  Gen: No fatigue, anorexia, insomnia, fever.   Eyes: No vision loss, double vision, drainage, eye pain.   ENT: No pharyngitis, dry mouth, no hearing changes or ear discharge  Cardiac: No chest pain, palpitations, syncope, near syncope.   Pulmonary: No shortness of breath, cough, hemoptysis.   Heme/lymph: No swollen glands, fever, bleeding.   GI: No abdominal pain, change in bowel habits, melena, hematemesis, hematochezia, nausea, vomiting, diarrhea.   : No discharge, dysuria,  frequency, urgency, hematuria.  Endo: No polyuria or weight loss.   Musculoskeletal: Negative for any pain or loss of ROM/weakness  Skin: No rashes or lesions  Neuro: Normal speech, no numbness or weakness. No gait difficulties  Review of systems is otherwise negative unless stated above or in history of present illness.    Physical Exam:  Constitutional:  no distress, alert and cooperative  Eyes: clear sclera  Head/Neck: No apparent injury, trachea midline  Respiratory/Thorax: Patent airways, thorax symmetric, breathing comfortably  Cardiovascular: no pitting edema  Gastrointestinal: Nondistended  Musculoskeletal: ROM intact  Extremities: no clubbing  Neurological: alert, gibson x4  Psychological: Appropriate affect    No results found for this or any previous visit (from the past 24 hour(s)).     Plan:    - R QL single shot block performed preoperatively on 9/25  - Pain medications per primary team  - Will see on POD1 if inpatient    Acute Pain Team  pg [phone] ph [phone].

## 2024-09-25 NOTE — ANESTHESIA PROCEDURE NOTES
Peripheral Block    Patient location during procedure: pre-op  Start time: 9/25/2024 10:29 AM  End time: 9/25/2024 10:35 AM  Reason for block: at surgeon's request and post-op pain management  Staffing  Performed: resident   Authorized by: AMBAR Mary-CRNA    Performed by: Alena Reynolds MD  Preanesthetic Checklist  Completed: patient identified, IV checked, site marked, risks and benefits discussed, surgical consent, monitors and equipment checked, pre-op evaluation and timeout performed   Timeout performed at: 9/25/2024 10:29 AM  Peripheral Block  Patient position: laying flat  Prep: ChloraPrep  Patient monitoring: heart rate and continuous pulse ox  Block type: QL  Laterality: right  Injection technique: single-shot  Guidance: ultrasound guided  Local infiltration: ropivacaine  Needle  Needle type: Tuohy   Needle gauge: 26 G  Needle length: 8 cm  Needle localization: ultrasound guidance     image stored in chart  Assessment  Injection assessment: negative aspiration for heme, no paresthesia on injection, incremental injection and local visualized surrounding nerve on ultrasound  Additional Notes  QL single shot. informed consent obtained. risks and benefits discussed. ASA monitors placed, timeout performed. Pt positioned, prepped with chlorhexidine, draped with sterile towels. Ultrasound guidance used with visualization of the needle throughout duration of the procedure. Aspiration was negative. A total of 30 cc 0.5% ropivacaine, 50mcg epinephrine, and 4mg decadron and 8mcg of precedex injected. Patient tolerated procedure well.     Timeout by Indy OBRIEN

## 2024-09-26 ENCOUNTER — APPOINTMENT (OUTPATIENT)
Dept: RADIOLOGY | Facility: HOSPITAL | Age: 69
DRG: 522 | End: 2024-09-26
Payer: MEDICARE

## 2024-09-26 LAB
ABO GROUP (TYPE) IN BLOOD: NORMAL
ANTIBODY SCREEN: NORMAL
ERYTHROCYTE [DISTWIDTH] IN BLOOD BY AUTOMATED COUNT: 13.4 % (ref 11.5–14.5)
HCT VFR BLD AUTO: 20.8 % (ref 41–52)
HGB BLD-MCNC: 6.8 G/DL (ref 13.5–17.5)
MCH RBC QN AUTO: 32.4 PG (ref 26–34)
MCHC RBC AUTO-ENTMCNC: 32.7 G/DL (ref 32–36)
MCV RBC AUTO: 99 FL (ref 80–100)
NRBC BLD-RTO: 0 /100 WBCS (ref 0–0)
PLATELET # BLD AUTO: 163 X10*3/UL (ref 150–450)
RBC # BLD AUTO: 2.1 X10*6/UL (ref 4.5–5.9)
RH FACTOR (ANTIGEN D): NORMAL
WBC # BLD AUTO: 12.2 X10*3/UL (ref 4.4–11.3)

## 2024-09-26 PROCEDURE — 97165 OT EVAL LOW COMPLEX 30 MIN: CPT | Mod: GO

## 2024-09-26 PROCEDURE — 97530 THERAPEUTIC ACTIVITIES: CPT | Mod: GP

## 2024-09-26 PROCEDURE — 36415 COLL VENOUS BLD VENIPUNCTURE: CPT | Performed by: STUDENT IN AN ORGANIZED HEALTH CARE EDUCATION/TRAINING PROGRAM

## 2024-09-26 PROCEDURE — 36415 COLL VENOUS BLD VENIPUNCTURE: CPT

## 2024-09-26 PROCEDURE — 36430 TRANSFUSION BLD/BLD COMPNT: CPT

## 2024-09-26 PROCEDURE — 2500000002 HC RX 250 W HCPCS SELF ADMINISTERED DRUGS (ALT 637 FOR MEDICARE OP, ALT 636 FOR OP/ED): Performed by: STUDENT IN AN ORGANIZED HEALTH CARE EDUCATION/TRAINING PROGRAM

## 2024-09-26 PROCEDURE — 85027 COMPLETE CBC AUTOMATED: CPT | Performed by: STUDENT IN AN ORGANIZED HEALTH CARE EDUCATION/TRAINING PROGRAM

## 2024-09-26 PROCEDURE — P9040 RBC LEUKOREDUCED IRRADIATED: HCPCS

## 2024-09-26 PROCEDURE — 2500000004 HC RX 250 GENERAL PHARMACY W/ HCPCS (ALT 636 FOR OP/ED): Performed by: STUDENT IN AN ORGANIZED HEALTH CARE EDUCATION/TRAINING PROGRAM

## 2024-09-26 PROCEDURE — 86901 BLOOD TYPING SEROLOGIC RH(D): CPT

## 2024-09-26 PROCEDURE — 97530 THERAPEUTIC ACTIVITIES: CPT | Mod: GO

## 2024-09-26 PROCEDURE — 2500000001 HC RX 250 WO HCPCS SELF ADMINISTERED DRUGS (ALT 637 FOR MEDICARE OP): Performed by: STUDENT IN AN ORGANIZED HEALTH CARE EDUCATION/TRAINING PROGRAM

## 2024-09-26 PROCEDURE — 86920 COMPATIBILITY TEST SPIN: CPT

## 2024-09-26 PROCEDURE — 72170 X-RAY EXAM OF PELVIS: CPT | Performed by: RADIOLOGY

## 2024-09-26 PROCEDURE — 1100000001 HC PRIVATE ROOM DAILY

## 2024-09-26 PROCEDURE — 72170 X-RAY EXAM OF PELVIS: CPT

## 2024-09-26 PROCEDURE — 97161 PT EVAL LOW COMPLEX 20 MIN: CPT | Mod: GP

## 2024-09-26 PROCEDURE — 99231 SBSQ HOSP IP/OBS SF/LOW 25: CPT | Performed by: STUDENT IN AN ORGANIZED HEALTH CARE EDUCATION/TRAINING PROGRAM

## 2024-09-26 ASSESSMENT — COGNITIVE AND FUNCTIONAL STATUS - GENERAL
TOILETING: A LITTLE
WALKING IN HOSPITAL ROOM: A LOT
CLIMB 3 TO 5 STEPS WITH RAILING: TOTAL
MOVING FROM LYING ON BACK TO SITTING ON SIDE OF FLAT BED WITH BEDRAILS: A LITTLE
CLIMB 3 TO 5 STEPS WITH RAILING: A LOT
DAILY ACTIVITIY SCORE: 15
MOBILITY SCORE: 14
STANDING UP FROM CHAIR USING ARMS: A LOT
HELP NEEDED FOR BATHING: A LITTLE
WALKING IN HOSPITAL ROOM: TOTAL
PERSONAL GROOMING: A LITTLE
MOVING TO AND FROM BED TO CHAIR: A LOT
MOVING FROM LYING ON BACK TO SITTING ON SIDE OF FLAT BED WITH BEDRAILS: A LITTLE
EATING MEALS: A LITTLE
MOBILITY SCORE: 14
DRESSING REGULAR UPPER BODY CLOTHING: A LOT
DAILY ACTIVITIY SCORE: 17
DAILY ACTIVITIY SCORE: 18
TURNING FROM BACK TO SIDE WHILE IN FLAT BAD: A LITTLE
PERSONAL GROOMING: A LITTLE
DRESSING REGULAR UPPER BODY CLOTHING: A LITTLE
DRESSING REGULAR LOWER BODY CLOTHING: A LITTLE
MOBILITY SCORE: 8
EATING MEALS: A LITTLE
TOILETING: A LITTLE
MOVING FROM LYING ON BACK TO SITTING ON SIDE OF FLAT BED WITH BEDRAILS: A LOT
DRESSING REGULAR LOWER BODY CLOTHING: A LOT
TURNING FROM BACK TO SIDE WHILE IN FLAT BAD: A LOT
HELP NEEDED FOR BATHING: A LOT
STANDING UP FROM CHAIR USING ARMS: A LOT
CLIMB 3 TO 5 STEPS WITH RAILING: A LOT
MOVING TO AND FROM BED TO CHAIR: A LOT
TURNING FROM BACK TO SIDE WHILE IN FLAT BAD: A LITTLE
HELP NEEDED FOR BATHING: A LITTLE
PERSONAL GROOMING: A LITTLE
WALKING IN HOSPITAL ROOM: A LOT
STANDING UP FROM CHAIR USING ARMS: TOTAL
TOILETING: A LOT
MOVING TO AND FROM BED TO CHAIR: TOTAL
DRESSING REGULAR LOWER BODY CLOTHING: A LITTLE
DRESSING REGULAR UPPER BODY CLOTHING: A LOT

## 2024-09-26 ASSESSMENT — PAIN SCALES - GENERAL
PAINLEVEL_OUTOF10: 6
PAINLEVEL_OUTOF10: 5 - MODERATE PAIN
PAINLEVEL_OUTOF10: 5 - MODERATE PAIN
PAINLEVEL_OUTOF10: 7
PAINLEVEL_OUTOF10: 5 - MODERATE PAIN
PAINLEVEL_OUTOF10: 7
PAINLEVEL_OUTOF10: 0 - NO PAIN
PAINLEVEL_OUTOF10: 7
PAINLEVEL_OUTOF10: 5 - MODERATE PAIN
PAINLEVEL_OUTOF10: 7
PAINLEVEL_OUTOF10: 7
PAINLEVEL_OUTOF10: 8

## 2024-09-26 ASSESSMENT — PAIN - FUNCTIONAL ASSESSMENT
PAIN_FUNCTIONAL_ASSESSMENT: 0-10

## 2024-09-26 ASSESSMENT — ACTIVITIES OF DAILY LIVING (ADL): LACK_OF_TRANSPORTATION: NO

## 2024-09-26 ASSESSMENT — PAIN DESCRIPTION - LOCATION
LOCATION: HIP
LOCATION: HIP

## 2024-09-26 ASSESSMENT — PAIN DESCRIPTION - ORIENTATION
ORIENTATION: RIGHT
ORIENTATION: RIGHT

## 2024-09-26 NOTE — PROGRESS NOTES
"Orthopaedic Surgery Progress Note    S:  No acute events overnight. Pain well controlled. Denies chest pain, shortness of breath, or fevers.    O:  /55   Pulse 78   Temp 36.2 °C (97.2 °F) (Temporal)   Resp 14   Ht 1.803 m (5' 11\")   Wt 68.1 kg (150 lb 2.1 oz)   SpO2 98%   BMI 20.94 kg/m²     Gen: arousable, NAD, appropriately conversational  Cardiac: extremities warm  Resp: nonlabored on RA  GI: soft, nondistended    MSK:  Right Lower Extremity:   -Dressing c/d/i  -Fires DF/PF/EHL/FHL  -SILT in saph/sural/SPN/DPN distributions  -Foot warm, well perfused  -Palpable DP pulse, brisk cap refill  -Compartments soft and compressible  - leg lengths approximately equal    A/P: 69 y.o. male s/p R hip MELISSA/arthroplasty, ORIF R acetabulum on 9/25 with Dr. Kerr.      Plan:  - Weight bearing: WBAT RLE  - AP pelvis pending  - DVT ppx: SCDs, ASA 81 bid  - Diet: Regular  - Pain: Tylenol, oxycodone 5/10  - Antibiotics: perioperative ancef 2g q8hr x3 doses  - FEN: HLIV with good PO intake  - Bowel Regimen: Colace, senna, dulcolax  - PT/OT  - Pulm: Encourage IS  - Continue home medications  - No peñaloza    Dispo: Pending PT and AP pelvis    Kristofer Swenson MD  PGY-3 Orthopedic Surgery  [facility]  Epic Chat Preferred  Pager: [phone]    While inpatient, this patient will be followed by the Orthopaedic Trauma team. Please see contact information below:    First call: Jose Delgado, PGY-1  Second call: Jessee Feliciano, PGY-2   Third call: Kristofer Swenson, PGY-3    6pm-6am M-F, Holidays, and weekends page Ortho on-call @[phone] with urgent questions/concerns.          "

## 2024-09-26 NOTE — PROGRESS NOTES
09/26/24 0900   Discharge Planning   Living Arrangements Spouse/significant other   Support Systems Spouse/significant other   Assistance Needed Yes   Type of Residence Private residence   Number of Stairs to Enter Residence 4   Number of Stairs Within Residence 0   Do you have animals or pets at home? No   Who is requesting discharge planning? Provider   Home or Post Acute Services In home services   Type of Home Care Services Home PT;Home OT   Expected Discharge Disposition Home H   Does the patient need discharge transport arranged? No   Financial Resource Strain   How hard is it for you to pay for the very basics like food, housing, medical care, and heating? Pt Declined   Housing Stability   In the last 12 months, was there a time when you were not able to pay the mortgage or rent on time? Pt Declined   In the past 12 months, how many times have you moved where you were living? 0   At any time in the past 12 months, were you homeless or living in a shelter (including now)? N   Transportation Needs   In the past 12 months, has lack of transportation kept you from medical appointments or from getting medications? no   In the past 12 months, has lack of transportation kept you from meetings, work, or from getting things needed for daily living? No     I met with Nasir at the bedside regarding discharge planning and home going needs. Patient states that he lives home with his wife [phone] where he is usually independent with ADL's without assistive devices. Patient is pending therapy recommendations for discharge. I will continue to follow with a safe discharge plan.

## 2024-09-26 NOTE — PROGRESS NOTES
Occupational Therapy    Evaluation/Treatment    Patient Name: Nasir Camacho  MRN: [id number]  Department: [place] [address]  Room: 6028/6028-A  Today's Date: 09/26/24  Time Calculation  Start Time: 0850  Stop Time: 0926  Time Calculation (min): 36 min       Assessment:  OT Assessment: difficulty I/ADLs, safety, fxnl mob  Prognosis: Good  Barriers to Discharge: None  Evaluation/Treatment Tolerance: Patient limited by fatigue, Patient limited by pain  Medical Staff Made Aware: Yes  End of Session Communication: Bedside nurse, PCT/NA/CTA (present end session)  End of Session Patient Position: Bed, 3 rail up, Alarm on  OT Assessment Results: Decreased ADL status, Decreased upper extremity strength, Decreased endurance, Decreased functional mobility, Decreased gross motor control, Decreased IADLs, Decreased safe judgment during ADL  Prognosis: Good  Barriers to Discharge: None  Evaluation/Treatment Tolerance: Patient limited by fatigue, Patient limited by pain  Medical Staff Made Aware: Yes  Strengths: Attitude of self, Ability to acquire knowledge  Barriers to Participation: Comorbidities  Plan:  Treatment Interventions: ADL retraining, Functional transfer training, UE strengthening/ROM, Endurance training, Patient/family training, Equipment evaluation/education, Compensatory technique education  OT Frequency: 3 times per week  OT Discharge Recommendations: High intensity level of continued care  Equipment Recommended upon Discharge:  (hip kit)  OT Recommended Transfer Status: Assist of 2  OT - OK to Discharge: Yes  Treatment Interventions: ADL retraining, Functional transfer training, UE strengthening/ROM, Endurance training, Patient/family training, Equipment evaluation/education, Compensatory technique education    Subjective   Current Problem:  1. Post-traumatic osteoarthritis of right hip  calcium carbonate-vitamin D3 (Oscal-500) 500 mg-10 mcg (400 unit) tablet    acetaminophen (Tylenol) 325 mg tablet    docusate sodium  (Colace) 100 mg capsule    aspirin 81 mg EC tablet      2. Postoperative pain  oxyCODONE (Roxicodone) 5 mg immediate release tablet        General:   OT Received On: 09/26/24  General  Reason for Referral: s/p R hip MELISSA/arthroplasty, ORIF R acetabulum Total Hip Anterior Approach   Past Medical History Relevant to Rehab: CAD, NSTEMI s/p LAD PCI 1999, aortic stenosis s/p TAVR, HTN, HLD, hemorraghic stroke with right hemiparesis, pulmonary nodule.  Family/Caregiver Present: No  Co-Treatment: PT  Co-Treatment Reason: maximize pt safety  Prior to Session Communication: Bedside nurse  Patient Position Received: Bed, 3 rail up, Alarm on  General Comment: pleasant, cooperative, extended time mobility required in session  Precautions:  Hearing/Visual Limitations: Glasses on during session  LE Weight Bearing Status:  (RLE)  Medical Precautions: Fall precautions    Vital Sign (Past 2hrs)        Date/Time Vitals Session Patient Position Pulse Resp SpO2 BP MAP (mmHg)    09/26/24 850 -- DURING OT -- sup, stand, sup   73   97 %  104/58,   89/51  95/50, 123/63   119                                 Pain:  Pain Assessment  Pain Assessment: 0-10  0-10 (Numeric) Pain Score: 7  Pain Location:  (R HIP)    Objective   Cognition:  Overall Cognitive Status: Within Functional Limits  Orientation Level: Oriented X4  Following Commands: Follows one step commands without difficulty  Safety Judgment: Decreased awareness of need for assistance  Insight: Mild           Home Living:  Type of Home: House  Lives With: Spouse  Home Adaptive Equipment: Walker rolling or standard, Wheelchair-manual  Home Layout: Two level, Able to live on main level with bedroom/bathroom (1st floor couch with half bath, full bath/bedroom 2nd floor)  Home Access: Stairs to enter with rails  Entrance Stairs-Number of Steps: 4  Bathroom Shower/Tub: Walk-in shower  Bathroom Toilet: Standard  Bathroom Equipment:  (shower chair, grab bars shower)  Prior Function:  Level of  Stanly:  (A LBD as needed spouse I other ADLs, A IADLs)  Receives Help From: Family  Ambulatory Assistance:  (WhW, wheelchair mod I)  Vocational: Retired, Part time employment  Leisure: eating out  Hand Dominance: Right  IADL History:  IADL Comments: A cleaning, A driving, +fall  ADL:  Eating Assistance: Independent  Grooming Assistance:  (min A anticipated seated)  Bathing Assistance:  (mod A anticipated seated AE)  UE Dressing Assistance: Moderate  LE Dressing Assistance: Maximal  Toileting Assistance with Device:  (max A anticipated WhW BSC)  Functional Deficit: Setup, Steadying, Verbal cueing, Supervision/safety, Increased time to complete  Activities of Daily Living:      UE Dressing  UE Dressing Level of Assistance:  (mod A Adjust gown EOB)    LE Dressing  LE Dressing:  (max A adjust socks supine)       Activity Tolerance:  Endurance:  (fair)       Bed Mobility/Transfers: Bed Mobility  Bed Mobility: Yes  Bed Mobility 1  Level of Assistance 1:  (sup/sit mod A x2, boost in bed max A x2)    Transfers  Transfer: Yes  Transfer 1  Transfer Level of Assistance 1:  (sit/stand max A x2 WhW)         Sitting Balance:  Dynamic Sitting Balance  Dynamic Sitting-Level of Assistance:  (seated EOB >8 min CGA 2 UE support, extended time for vitals, +dizziness sup to sit RN notified)  Dynamic Standing max A x2 WhW +orthostatic      Therapy/Activity: Therapeutic Activity  Therapeutic Activity Performed:  (OT provided education pain management and prevention s/p ant approach I/ADLs, AE)     Vision:Vision - Basic Assessment  Current Vision: Wears glasses all the time  Sensation:  Light Touch: Partial deficits in the RLE (difficulty straightening RLE)  Strength:  Strength Comments: R shoulder flex 3+/5 distal 4/5 LUE shoulder flex 4/5 distal 4+/5     Coordination:  Movements are Fluid and Coordinated:  (able to grasp walker BUE)   Hand Function:  Hand Function  Gross Grasp: Functional  Extremities: RUE   RUE : Within  Functional Limits and LUE   LUE: Within Functional Limits      Outcome Measures: [facility] Daily Activity  Putting on and taking off regular lower body clothing: A lot  Bathing (including washing, rinsing, drying): A lot  Putting on and taking off regular upper body clothing: A lot  Toileting, which includes using toilet, bedpan or urinal: A lot  Taking care of personal grooming such as brushing teeth: A little  Eating Meals: None  Daily Activity - Total Score: 15         and OT Adult Other Outcome Measures  4AT: -    Education Documentation  Handouts, taught by Kristi Wade OT at 9/26/2024 11:03 AM.  Learner: Patient  Readiness: Acceptance  Method: Explanation, Demonstration  Response: Verbalizes Understanding    Body Mechanics, taught by Kristi Wade OT at 9/26/2024 11:03 AM.  Learner: Patient  Readiness: Acceptance  Method: Explanation, Demonstration  Response: Verbalizes Understanding    Precautions, taught by Kristi Wade OT at 9/26/2024 11:03 AM.  Learner: Patient  Readiness: Acceptance  Method: Explanation, Demonstration  Response: Verbalizes Understanding    ADL Training, taught by Kristi Wade OT at 9/26/2024 11:03 AM.  Learner: Patient  Readiness: Acceptance  Method: Explanation, Demonstration  Response: Verbalizes Understanding    Education Comments  No comments found.          Goals:  Encounter Problems       Encounter Problems (Active)       ADLs       Patient will perform UB and LB bathing  with stand by assist level of assistance and AE. (Progressing)       Start:  09/26/24    Expected End:  10/17/24            Patient with complete upper body dressing with independent level  (Progressing)       Start:  09/26/24    Expected End:  10/17/24            Patient with complete lower body dressing with contact guard assist level of assistance donning and doffing all LE clothes  with PRN adaptive equipment  (Progressing)       Start:  09/26/24    Expected End:  10/17/24            Patient will  complete daily grooming tasks  with independent level  (Progressing)       Start:  09/26/24    Expected End:  10/17/24            Patient will complete toileting including hygiene clothing management/hygiene with minimal assist  level of assistance and LRD. (Progressing)       Start:  09/26/24    Expected End:  10/17/24               EXERCISE/STRENGTHENING       Patient with increase BUE to WFL strength. (Progressing)       Start:  09/26/24    Expected End:  10/17/24               MOBILITY       Patient will perform Functional mobility mod  Household distances/Community Distances with minimal assist  level of assistance and least restrictive device in order to improve safety and functional mobility. (Progressing)       Start:  09/26/24    Expected End:  10/17/24               TRANSFERS       Patient will perform bed mobility contact guard assist level of assistance and bed rails in order to improve safety and independence with mobility (Progressing)       Start:  09/26/24    Expected End:  10/17/24            Patient will complete functional transfer least restrictive device with minimal assist  level of assistance. (Progressing)       Start:  09/26/24    Expected End:  10/17/24

## 2024-09-26 NOTE — PROGRESS NOTES
Postop Pain HPI -   Palliative: relieved with IV analgesics and regional local anesthetics  Provocative: movement  Quality:  burning and aching  Radiation:  none  Severity:  2/10 at rest, 6/10 with movement  Timing: constant    24-HOUR OPIOID CONSUMPTION:  No opioids    Scheduled medications  acetaminophen, 650 mg, oral, q6h CHRIS  amLODIPine, 10 mg, oral, Daily  aspirin, 81 mg, oral, BID  atorvastatin, 40 mg, oral, Daily  carvedilol, 3.125 mg, oral, BID  citalopram, 20 mg, oral, Daily  oxybutynin, 5 mg, oral, BID  polyethylene glycol, 17 g, oral, Daily      Continuous medications  lactated Ringer's, 20 mL/hr, Last Rate: Stopped (09/25/24 1524)  lactated Ringer's, 100 mL/hr, Last Rate: 100 mL/hr (09/25/24 1834)  oxygen, 2 L/min, Last Rate: Stopped (09/25/24 1810)      PRN medications  PRN medications: bisacodyl, bisacodyl, HYDROmorphone, naloxone, oxyCODONE, oxyCODONE     Physical Exam:  Constitutional:  no distress, alert and cooperative  Eyes: clear sclera  Head/Neck: No apparent injury, trachea midline  Respiratory/Thorax: Patent airways, thorax symmetric, breathing comfortably  Cardiovascular: no pitting edema  Gastrointestinal: Nondistended  Musculoskeletal: ROM intact  Extremities: no clubbing  Neurological: alert, gibson x4  Psychological: Appropriate affect    No results found for this or any previous visit (from the past 24 hour(s)).     Nasir Camacho is a 69 y.o. year old male patient who presents for arthroplasty total hip, percutaneous acetabular fixation and retrograde femoral nail removal with Dr. Kerr on 9/25. Acute Pain consulted for block for postoperative pain control.     Plan:     - R QL single shot block performed preoperatively on 9/25  - Pain medications per primary team  - Will sign off     Acute Pain Team  pg [phone] ph [phone].

## 2024-09-26 NOTE — CARE PLAN
Problem: Skin  Goal: Decreased wound size/increased tissue granulation at next dressing change  Outcome: Progressing  Goal: Participates in plan/prevention/treatment measures  Outcome: Progressing  Goal: Prevent/manage excess moisture  Outcome: Progressing  Goal: Prevent/minimize sheer/friction injuries  Outcome: Progressing  Flowsheets (Taken 9/26/2024 4015)  Prevent/minimize sheer/friction injuries: Use pull sheet  Goal: Promote/optimize nutrition  Outcome: Progressing  Goal: Promote skin healing  Outcome: Progressing     Problem: Fall/Injury  Goal: Not fall by end of shift  Outcome: Progressing  Goal: Be free from injury by end of the shift  Outcome: Progressing  Goal: Verbalize understanding of personal risk factors for fall in the hospital  Outcome: Progressing  Goal: Verbalize understanding of risk factor reduction measures to prevent injury from fall in the home  Outcome: Progressing  Goal: Use assistive devices by end of the shift  Outcome: Progressing  Goal: Pace activities to prevent fatigue by end of the shift  Outcome: Progressing     Problem: Pain - Adult  Goal: Verbalizes/displays adequate comfort level or baseline comfort level  Outcome: Progressing     Problem: Safety - Adult  Goal: Free from fall injury  Outcome: Progressing     Problem: Discharge Planning  Goal: Discharge to home or other facility with appropriate resources  Outcome: Progressing     Problem: Chronic Conditions and Co-morbidities  Goal: Patient's chronic conditions and co-morbidity symptoms are monitored and maintained or improved  Outcome: Progressing     Problem: Nutrition  Goal: Electrolytes WNL  Outcome: Progressing  Goal: Promote healing  Outcome: Progressing  Goal: Maintain stable weight  Outcome: Progressing

## 2024-09-26 NOTE — CONSULTS
"Nutrition Initial Assessment:   Nutrition Assessment    Reason for Assessment: Admission nursing screening    Patient is a 69 y.o. male  s/p R hip MELISSA/arthroplasty, ORIF R acetabulum on 9/25 with Dr. Kerr.      Past Medical History:   Diagnosis Date    Aortic stenosis     s/p TAVR 6/29/21    BRCA positive     Chronic pain disorder     Coronary artery disease     COVID-19 09/23/2022    Displaced fracture of right acetabulum (Multi)     Graves disease     Hip fracture (Multi)     Hyperlipidemia     Hypertension     Hyperthyroidism     Myocardial infarction (Multi) 1999    acute plaque rupture in the LAD    Pelvic fracture (Multi)     Pulmonary nodule     stable    Stroke (Multi) 05/2016    hemorrhagic stroke with right hemiparesis in 2016      Past Surgical History:   Procedure Laterality Date    ANKLE SURGERY      CARDIAC CATHETERIZATION      CARDIAC VALVE REPLACEMENT  06/29/2021    transcatheter aortic valve replacement    CORONARY ANGIOPLASTY WITH STENT PLACEMENT  10/06/2014    Cath Stent Placement    CT ANGIO NECK  07/18/2021    CT NECK ANGIO W AND WO IV CONTRAST 7/18/2021 [facility]    CT HEAD ANGIO W AND WO IV CONTRAST  07/18/2021    CT HEAD ANGIO W AND WO IV CONTRAST 7/18/2021 [facility] CLINICAL [facility]    FEMUR FRACTURE SURGERY  11/06/2014    Femur Repair        Nutrition History:  Energy Intake: Good > 75 %  Food and Nutrient History: Pt reports eating three meals per day, no changes in appetite. States suprirsed that his weight was down when he stepped on the scale at doctors office. Pt does note that he prefers to be lighter overall. Discussed unitentional weight loss could be a sign of other underlying medical conditions.  Food Allergies/Intolerances:  None  GI Symptoms: None  Oral Problems: None       Anthropometrics:  Height: 180.3 cm (5' 11\")   Weight: 68.1 kg (150 lb 2.1 oz)   BMI (Calculated): 20.95  IBW/kg (Dietitian Calculated): 78.2 kg  Percent of IBW: 87 %       Weight History:   Wt " "Readings from Last 10 Encounters:   09/25/24 68.1 kg (150 lb 2.1 oz)   09/20/24 68.4 kg (150 lb 14.4 oz)   06/23/24 74.8 kg (165 lb)   06/06/24 74.3 kg (163 lb 14.4 oz)   04/01/24 75.7 kg (166 lb 12.8 oz)   09/28/21 73.5 kg (162 lb)   08/09/21 73.6 kg (162 lb 5 oz)   08/02/21 73 kg (161 lb)   07/30/21 73.1 kg (161 lb 4 oz)   05/21/21 76.2 kg (168 lb)      Weight Change %:  Weight History / % Weight Change: 6.7 kg loss in 3 months  Significant Weight Loss: Yes  Interpretation of Weight Loss: >7.5% in 3 months    Nutrition Focused Physical Exam Findings:  Edema:  Edema: none  Physical Findings:  Skin:  (surgical incisions)    Nutrition Significant Labs:  CBC Trend:   Results from last 7 days   Lab Units 09/26/24  0705 09/20/24  1353   WBC AUTO x10*3/uL 12.2* 5.8   RBC AUTO x10*6/uL 2.10* 3.90*   HEMOGLOBIN g/dL 6.8* 12.4*   HEMATOCRIT % 20.8* 38.6*   MCV fL 99 99   PLATELETS AUTO x10*3/uL 163 226    , A1C:No results found for: \"HGBA1C\", BG POCT trend:    , Liver Function Trend:    , Renal Lab Trend:   Results from last 7 days   Lab Units 09/20/24  1353   POTASSIUM mmol/L 5.1   SODIUM mmol/L 140   EGFR mL/min/1.73m*2 73   BUN mg/dL 32*   CREATININE mg/dL 1.09    , Vit D:   Lab Results   Component Value Date    VITD25 14 (A) 03/29/2021        Nutrition Specific Medications:  Scheduled medications  acetaminophen, 650 mg, oral, q6h CHRIS  amLODIPine, 10 mg, oral, Daily  aspirin, 81 mg, oral, BID  atorvastatin, 40 mg, oral, Daily  carvedilol, 3.125 mg, oral, BID  citalopram, 20 mg, oral, Daily  oxybutynin, 5 mg, oral, BID  polyethylene glycol, 17 g, oral, Daily      Continuous medications  lactated Ringer's, 20 mL/hr, Last Rate: Stopped (09/25/24 1524)  lactated Ringer's, 100 mL/hr, Last Rate: 100 mL/hr (09/25/24 1834)  oxygen, 2 L/min, Last Rate: Stopped (09/25/24 1810)      PRN medications  PRN medications: bisacodyl, bisacodyl, HYDROmorphone, naloxone, oxyCODONE, oxyCODONE      I/O:   Last BM Date: 09/24/24;      Dietary " Orders (From admission, onward)       Start     Ordered    09/25/24 1753  Adult diet Regular  Diet effective now        Question:  Diet type  Answer:  Regular    09/25/24 1752                     Estimated Needs:   Total Energy Estimated Needs (kCal): 2000 kCal  Method for Estimating Needs: ~30 kcals/kg actual BW  Total Protein Estimated Needs (g): 80 g  Method for Estimating Needs: ~1.2 gm/kg actual BW  Total Fluid Estimated Needs (mL):  (1ml/kcal or per team)           Nutrition Diagnosis   Malnutrition Diagnosis  Patient has Malnutrition Diagnosis: No    Nutrition Diagnosis  Patient has Nutrition Diagnosis: Yes  Diagnosis Status (1): New  Nutrition Diagnosis 1: Unintended weight loss  Related to (1): unclear etiology  As Evidenced by (1): down ~15 lbs over past 3 months  Additional Assessment Information (1): Possible that pt has had a reduction inmuscle mass over past few months from reduced activity vs other unclear cause at this time.       Nutrition Interventions/Recommendations         Nutrition Prescription:  Individualized Nutrition Prescription Provided for : diet, pt declines ONS        Nutrition Interventions:   Interventions: Meals and snacks  Meals and Snacks: General healthful diet  Goal: encouraged pt to increase calorie/protein content for weight maintenance  Additional Interventions: continue to monitor weight status > notify physician if continues to lose weight         Nutrition Monitoring and Evaluation   Food/Nutrient Related History Monitoring  Monitoring and Evaluation Plan: Energy intake  Energy Intake: Estimated energy intake  Criteria: >75% est needs via PO diet    Body Composition/Growth/Weight History  Monitoring and Evaluation Plan: Weight  Criteria: monitor weight trend                   Time Spent (min): 45 minutes

## 2024-09-26 NOTE — PROGRESS NOTES
Physical Therapy    Physical Therapy Evaluation & Treatment    Patient Name: Nasir Camacho  MRN: [id number]  Department: [place] [address]  Room: [facility]6028-  Today's Date: 9/27/2024   Time Calculation  Start Time: 0849  Stop Time: 0926  Time Calculation (min): 37 min    Assessment/Plan   PT Assessment  PT Assessment Results: Decreased strength, Decreased range of motion, Decreased endurance, Impaired balance, Decreased mobility, Impaired sensation, Pain  Rehab Prognosis: Excellent  Barriers to Discharge: OH, functional mobility  Evaluation/Treatment Tolerance: Other (Comment) (Symptomatic OH)  Medical Staff Made Aware: Yes  Strengths: Rehab experience  End of Session Communication: Bedside nurse (Aware of low BP)  Assessment Comment: 69 y.o. male presents for anterior SURI/acetabular fixation/femoral nail removal. Pt previously ambulating short distances and transferring to  with FWW. Pt able to stand with Max A +2 but needing to return to supine 2/2 BP of 89/51 and dizziness. Pt is a great candidate for High Intenisty Rehab after DC to facilitate return to PLOF.  End of Session Patient Position: Bed, 3 rail up   IP OR SWING BED PT PLAN  Inpatient or Swing Bed: Inpatient  PT Plan  Treatment/Interventions: Bed mobility, Transfer training, Gait training, Stair training, Balance training, Strengthening, Endurance training, Range of motion, Therapeutic exercise, Therapeutic activity  PT Plan: Ongoing PT  PT Frequency: Daily  PT Discharge Recommendations: High intensity level of continued care  Equipment Recommended upon Discharge:  (Owns)  PT Recommended Transfer Status: Assist x2, Assistive device  PT - OK to Discharge: Yes      Subjective     General Visit Information:  General  Reason for Referral: Presenting for right anterior SURI/acetabular fixation/femoral nail removal  Past Medical History Relevant to Rehab: HTN, CAD, NSTEMI s/p LAD PCI 1999, aortic stenosis s/p TAVR, CVA with residual right-sided weakness  presenting  Family/Caregiver Present: No  Co-Treatment: OT  Co-Treatment Reason: Co-treatment to optimize patient safety 2/2 pt's medical history (CVA)  Prior to Session Communication: Bedside nurse  Patient Position Received: Bed, 3 rail up, Alarm on  Preferred Learning Style: auditory, verbal, visual  General Comment: Pt pleasant and agreeable to therapy session  Home Living:  Home Living  Type of Home: House  Lives With: Spouse (Wife)  Home Adaptive Equipment: Walker rolling or standard, Wheelchair-manual  Home Layout: Two level, Able to live on main level with bedroom/bathroom  Home Access: Stairs to enter with rails  Entrance Stairs-Rails: Right  Entrance Stairs-Number of Steps: 4  Bathroom Shower/Tub: Walk-in shower  Bathroom Equipment: Grab bars in shower, Shower chair with back  Home Living Comments: Pt reports for the past 6 weeks he has been sleeping on the couch downstairs and using half bath to wash himself  Prior Level of Function:  Prior Function Per Pt/Caregiver Report  Level of [place]: Needs assistance with ADLs  Receives Help From: Family (Wife)  Ambulatory Assistance: Independent (Pt able to transfer to  and ambulate short distances independently with FWW)  Vocational: Part time employment  Hand Dominance: Right  Precautions:  Precautions  Hearing/Visual Limitations: Glasses on during session  LE Weight Bearing Status: Weight Bearing as Tolerated (RLE)  Medical Precautions: Fall precautions  Precautions Comment: Pt orthostatic this session             Objective   Pain:  Pain Assessment  Pain Assessment: 0-10  0-10 (Numeric) Pain Score: 7  Pain Type: Acute pain  Pain Location: Hip  Pain Orientation: Right  Pain Interventions: Ambulation/increased activity  Cognition:  Cognition  Orientation Level: Oriented X4    General Assessments:    Activity Tolerance  Endurance: Tolerates less than 10 min exercise with changes in vital signs  Early Mobility/Exercise Safety Screen: Proceed with  mobilization - No exclusion criteria met    Sensation  Light Touch: Partial deficits in the RUE, Partial deficits in the RLE (Chronic R hand numbness, new R foot numbness)    Coordination  Movements are Fluid and Coordinated: Yes    Postural Control  Postural Control: Impaired (Forward flexed posture)    Static Sitting Balance  Static Sitting-Balance Support: Bilateral upper extremity supported, Feet supported  Static Sitting-Level of Assistance: Contact guard  Static Sitting-Comment/Number of Minutes: 8 mins    Static Standing Balance  Static Standing-Balance Support: Bilateral upper extremity supported  Static Standing-Level of Assistance: Maximum assistance  Static Standing-Comment/Number of Minutes: 30s  Functional Assessments:     Bed Mobility  Bed Mobility: Yes  Bed Mobility 1  Bed Mobility 1: Supine to sitting  Level of Assistance 1: Moderate assistance, +2, Moderate verbal cues  Bed Mobility Comments 1: HOB elevated  Bed Mobility 2  Bed Mobility  2: Sitting to supine  Level of Assistance 2: Moderate assistance, +2, Moderate verbal cues  Bed Mobility Comments 2: HOB elevated  Bed Mobility 3  Bed Mobility 3: Scooting  Level of Assistance 3: Maximum assistance, +2  Bed Mobility Comments 3: Boost    Transfers  Transfer: Yes  Transfer 1  Transfer From 1: Bed to  Transfer to 1: Stand  Technique 1: Sit to stand  Transfer Device 1: Walker  Transfer Level of Assistance 1: Maximum assistance, +2, Minimal verbal cues  Trials/Comments 1: Attempted BP read while in standing, but pt symptomatic and needing returned to sitting  Transfers 2  Transfer From 2: Stand to  Transfer to 2: Sit  Technique 2: Stand to sit  Transfer Device 2: Walker  Transfer Level of Assistance 2: Maximum assistance, +2, Minimal verbal cues    Ambulation/Gait Training  Ambulation/Gait Training Performed: No    Extremity/Trunk Assessments:    RLE   RLE : Exceptions to WFL  Strength RLE  RLE Overall Strength: Greater than or equal to 3/5 as evidenced  by functional mobility, Due to pain  LLE   LLE : Within Functional Limits  Treatments:  Treatment for increased time performing therapeutic activity to monitor pt's vital signs and symptoms    Outcome Measures:  [facility] Basic Mobility  Turning from your back to your side while in a flat bed without using bedrails: A lot  Moving from lying on your back to sitting on the side of a flat bed without using bedrails: A lot  Moving to and from bed to chair (including a wheelchair): Total  Standing up from a chair using your arms (e.g. wheelchair or bedside chair): Total  To walk in hospital room: Total  Climbing 3-5 steps with railing: Total  Basic Mobility - Total Score: 8    Encounter Problems       Encounter Problems (Active)       Mobility       LTG - Patient will ambulate household distance with Min A and LRD (Progressing)       Start:  09/26/24    Expected End:  10/10/24            LTG - Patient will navigate 4 steps with Min A and rails/device (Progressing)       Start:  09/26/24    Expected End:  10/10/24               PT Transfers       STG - Transfer from bed to chair with CGA and LRD (Progressing)       Start:  09/26/24    Expected End:  10/10/24            STG - Patient will perform bed mobility with Modified independence (Progressing)       Start:  09/26/24    Expected End:  10/10/24            STG - Patient will transfer sit to and from stand with CGA and LRD (Progressing)       Start:  09/26/24    Expected End:  10/10/24               Pain - Adult              Education Documentation  No documentation found.  Education Comments  No comments found.

## 2024-09-26 NOTE — SIGNIFICANT EVENT
Nasir Camacho has been recommended to be discharged to an Acute Rehabilitation facility by physical therapy and/or occupational therapy while in house. While the patient may stand to benefit from the level of therapy provided, they are unfortunately not appropriate for the following reason:    Medicare Total joint:  Per insurance guidelines (Medicare Regulations Part 12 section B), patients who have undergone total joint replacement are only eligible for Acute Rehab if they meet the following criteria: Underwent bilateral total hip/knee in the same admission, BMI >50, or age >85. Nasir Camacho underwent unilateral joint replacement, their Body mass index is 20.94 kg/m²., and is 69 y.o.. Therefore, they are more appropriate for a Skilled Nursing level of care.    The medical team will work with the Care Coordinator to set up discharge to a Skilled Nursing Facility.

## 2024-09-26 NOTE — OP NOTE
Arthroplasty Total Hip Anterior Approach (R), Removal Hardware Pelvis (R), Pelvic Fracture Percutaneous Fixation (R) Operative Note     Date: 2024  OR Location: [facility] OR    Name: Nasir Camacho, : 1955, Age: 69 y.o., MRN: [id number], Sex: male    Diagnosis  Pre-op Diagnosis      * Closed displaced combined transverse-posterior fracture of right acetabulum, initial encounter (Multi) [S32.461A]     * Post-traumatic osteoarthritis of right hip [M16.51]     * Presence of retained hardware [Z96.9] Post-op Diagnosis     * Closed displaced combined transverse-posterior fracture of right acetabulum, initial encounter (Multi) [S32.461A]     * Post-traumatic osteoarthritis of right hip [M16.51]     * Presence of retained hardware [Z96.9]     Procedures  Arthroplasty Total Hip Anterior Approach  29592 - PA ARTHRP ACETBLR/PROX FEM PROSTC AGRFT/ALGRFT    Removal Hardware Pelvis    Pelvic Fracture Percutaneous Fixation  19302 - PA PERQ SKELETAL FIXATION PST PELVIC BONE FX&/DIS    PA REMOVAL IMPLANT DEEP []  PA OPTX ACTBLR FX INVG ANT&POST 2 COLUMNS FX W/INT [83365]  Surgeons      * Rico Kerr - Primary    Resident/Fellow/Other Assistant:  Surgeons and Role:     * Shaun Laird DO - Resident - Assisting     * Ivory Mederos PA-C - OZZY First Assist    Procedure Summary  Anesthesia: General  ASA: III  Anesthesia Staff: Anesthesiologist: Shaunna Mendoza MD; Shira Jenkins MD  CRNA: JESSE Mary  Anesthesia Resident: Jeni Coffman MD  SRNA: Ashlyn Alvarez  Frontline Breaker: JESSE Washington  Estimated Blood Loss: 100mL  Intra-op Medications:   Administrations occurring from 0955 to 1230 on 24:   Medication Name Total Dose   sodium chloride 0.9 % irrigation solution 1,000 mL   lactated Ringer's infusion Cannot be calculated              Anesthesia Record               Intraprocedure I/O Totals          Intake    Tranexamic Acid 0.00 mL    The total shown is the total volume  documented since Anesthesia Start was filed.    Phenylephrine Drip 0.00 mL    The total shown is the total volume documented since Anesthesia Start was filed.    lactated Ringer's infusion 500.00 mL    Total Intake 500 mL       Output    Urine 170 mL    Est. Blood Loss 100 mL    Total Output 270 mL       Net    Net Volume 230 mL          Specimen: No specimens collected     Staff:   Circulator: Merari  Scrub Person: Dorothy Jainub Person: Nancy Hernandez Circulator: Tico Hernandez Scrub: Tico         Drains and/or Catheters:   [REMOVED] Urethral Catheter Non-latex 16 Fr. (Removed)       Tourniquet Times:         Implants:  Implants       Type Name Action Serial No.      Screw GUIDEWIRE, 2.8 W/FLUTES - [id number] Used, Not Implanted      Screw SCREW, CANNULATED, 6.5 X 32MM, 130MM - [id number] Implanted      Screw SCREW, 7.3MM CANNULATED, FULL THREAD, 100MM - [id number] Implanted      Screw SCREW CANCELLOUS 6.5 X 30 - [id number] Implanted      Joint Hip STEM, FEMORAL, ACTIS COLLAR, STD, SIZE 5 - [id number] Implanted      Screw SCREW CANCELLOUS 6.5 X 20 - [id number] Implanted      Screw SCREW CANCELLOUS 6.5 X 30 - [id number] Implanted      Joint Hip LINER, ALTRX, NEURTAL, 36 X 60MM - [id number] Implanted      Screw SCREW, PINNACLE CANCELL 6.5 X 50 - [id number] Implanted      Joint Hip ACETABULAR CUP, MULTI HOLE, SIZE 60MM - [id number] Implanted      Joint Hip FEMORAL HEAD, CERAMIC 36 +1.5 - [id number] Implanted               Findings: Right transverse acetabular fracture    Indications: Nasir Camacho is an 69 y.o. male who is having surgery for Closed displaced combined transverse-posterior fracture of right acetabulum, initial encounter (Multi) [S32.461A]  Post-traumatic osteoarthritis of right hip [M16.51]  Presence of retained hardware [Z96.9].     The patient was seen in the preoperative area. The risks, benefits, complications, treatment options, non-operative alternatives, expected recovery and  outcomes were discussed with the patient. The possibilities of reaction to medication, pulmonary aspiration, injury to surrounding structures, bleeding, recurrent infection, the need for additional procedures, failure to diagnose a condition, and creating a complication requiring transfusion or operation were discussed with the patient. The patient concurred with the proposed plan, giving informed consent.  The site of surgery was properly noted/marked if necessary per policy. The patient has been actively warmed in preoperative area. Preoperative antibiotics have been ordered and given within 1 hours of incision. Venous thrombosis prophylaxis have been ordered including unilateral sequential compression device    Procedure Details: Patient was brought back to the operating room placed on the operating table in the supine position with all bony prominences well-padded.  Surgical time was then confirmed confirming patient by name, medical number, date of birth, surgery to be performed, laterality surgical site, verification of patient allergies.  All in attendance were in agreement.  Anesthesia was then induced by the anesthesia team.  The patient's pelvis and right lower extremity were prepped and draped in the standard orthopedic fashion.  Preoperative antibiotics were given.      We began by removing the patient's right retrograde femoral nail. Nail had to be removed to allow for hip prosthesis stem.  The proximal interlocking screws were removed in percutaneous fashion utilizing x-ray for guidance.  There were 2 distal interlocking screws, one was removed in percutaneous fashion using orthogonal imaging.  Prior to removal of the second distal interlocking screw, our attention then turned to cannulating the tip of the nail so that it would not rotate around once all distal interlocking screws were removed.  Incision was carried through skin from inferior pole of the patella to the tibial tubercle.  Marcela  electrocautery was then used to dissect subcutaneous tissue and achieve hemostasis.  The patella tendon was then split in line with our incision using a 10 blade.  Given that the fracture was well-healed and the nail had been present for some time, there had been bone overgrowth over the tip of the nail.  Using a guidewire this was placed and malleted into the prior nail.  An opening reamer was then used to remove the bone at the distal tip of the nail.  Following this a screwdriver to remove the setscrew was placed and the setscrews were removed.  The extraction device for the retrograde femoral nail was then applied to the tip of the nail.  Prior to backslapping out the nail, the final distal interlocking screw was removed percutaneously under orthogonal imaging.  The nail was then back slapped out.  The wound was thoroughly irrigated.  Patella tendon was closed with 0 PDS in running fashion.  All subcutaneous tissue was closed with 2-0 Monocryl in interrupted fashion.  Skin closed with staples.    Attention then turned to percutaneous fixation of the T shape both column of the acetabulum.  A 2 cm incision over the iliac crest was made.  Bovie electrocautery was used to dissect subcutaneous tissue and achieve hemostasis.  The common origin of the abdominal musculature and gluteal abductors was then sharply incised.  Subperiosteal dissection elevating the iliacus off the inner table of the iliac bone occurred through our 2 cm incision.  A guidewire was then slid down the inner table of the ilium to achieve our start point.  Start point was confirmed on AP pelvis, iliac oblique, obturator oblique views.  The wire was advanced down into the ischium.  Given that the right hemipelvis had translated medially from the fracture, the bony cord or into the ileum was not possible without going through the joint.  However, given that we were placing a total hip, we felt that a screw in this location would serve as an  antiprotrusio screw.  A second guidewire was placed just posterior to this wire on the iliac oblique view, which would be out of the joint but be much shorter screw as it would not go down the entire ischium.  Both wires were confirmed to be in appropriate place on AP pelvis, iliac oblique, obturator oblique views.  The guidewires were measured.  The near cortex was drilled with a cannulated drill bit.  A partially-threaded 6.5 millimeter screw was placed first, the screw was the screw that was in the joint ever so slightly, and was placed first to compress the fracture.  A second screw fully threaded was then placed over the second wire and was tightened, both had excellent bite.  The guidewires were removed.  AP pelvis, iliac oblique,  oblique views demonstrated appropriate placement of the screws and appropriate length.  The percutaneous incision was then irrigated and closed with 2-0 Monocryl in interrupted fashion, staples for skin.    Our attention then turned to the anterior total hip portion.  The patient had to have drapes removed and transition to the [place] table.  The right leg was reprepped and draped in standard orthopedic fashion.    We made a direct anterior approach to the hip, centered over the  tensor fascia alfredo muscle belly.  Incision was made using the knife.   The fascia of the tensor muscle was incised.  We stayed within the  tensor compartment.  The lateral circumflex vessel was identified and  ligated.  We then elevated the rectus off the anterior hip capsule.   The anterior hip capsule was identified.  Inverted T-capsulotomy was  performed.  We placed a retractor around the neck.  Using the  anterior tubercle of the intertrochanteric line as the landmark, we  made a femoral neck cut.  The head was removed.  The head was  severely deformed and the articular cartilage was delaminated.  The patient did  have degenerative labral tear.  Additionally, we could see a portion of our  partially-threaded screw on the medial most aspect of the acetabulum and the cotyloid fossa.  The labrum was hypertrophic and some portion was calcified, it was  excised.  We then carefully placed retractors around the ring of the  acetabulum.  We began reaming.  We reamed starting with a 49 mm  reamer.  We then reamed up to 59 mm reamer.  We had good exposed  bleeding bone. Curette was used to remove cartilage from the cotyloid fossa and the head was reamed and the bone graft was packed in the cotyloid fossa and reversed reamed impacting bone graft in place. We used fluoroscopy to confirm appropriate reaming  and medialization.  We copiously irrigated and the 60 mm cup was then  placed.  I used the assistance of fluoroscopy and also the plane of the body to  appropriate cup positioning.  I was very satisfied  with the position of the cup.  The cup was impacted into place.   There was excellent stability of the cup.  In order to further  augment the stability, I chose to use multiple cancellous bone screw  through the holes in the cup spanning the fracture lines.  We had excellent purchase with these  screw.  We then copiously irrigated.  The neutral liner was then  inserted and impacted into place with good fit.  Next, we turned our  attention to preparation of the femur for the stem.  We externally  rotated the femur.  We made a capsular release both medial and  laterally.  We extended the leg.  The femur elena within the wound.   We then used calcar orientation to  the version of the stem.  A  box osteotome was used to lateralize the entry point.  The canal  finder was inserted.  We began broaching.  We started with a starter  broach and broached up to size 5.  There was excellent fit with the  broach.  There was no further subsiding of the broach.  The broach  was rotational and was very stable.  A high offset neck was placed on  the trunnion after it was cleaned based on preoperative templating.  A  trial of +1.5 head  was then placed.  The hip was reduced.  We took the hip through range  of motion.  The hip was very stable under fluoroscopy.  I was very  satisfied with the fit of the stem.  The leg length was equal. The hip was then dislocated.  The  broach was removed.  We then copiously irrigated and the actual stem  was impacted into place.  The collar sat about 1 mm shy of  resting on the calcar.  There was no further subsidence, and the  stent was rotational and stable.  At this point, we then cleaned the  trunnion and a +1.5, 36 mm head was then placed on the trunnion.  We  proceeded to reduce the hip, took it through extreme range of motion.  Again, the hip was very stable.  Radiographically, leg length was  equal.  We then copiously irrigated the wound bed.  We injected the  joint local solution to surrounding soft tissue.  The wound bed was dry with excellent hematosis.  Vancomycin powder was added to the wound.  We then repaired  the capsule and subsequently repaired the fascia of the tensor fascia alfredo with 0 PDS.  Subcutaneous tissue closed with 2-0 Monocryl in interrupted fashion.  Skin closed with 3-0 Monocryl in running fashion and Dermabond glue.  Mepilex dressing was placed over the wound.    The patient was then awoken and transferred to the [facility].  Leg lengths were assessed and appeared equal.  Patient was brought to PACU in stable condition without complication.    Complications:  None; patient tolerated the procedure well.    Disposition: PACU - hemodynamically stable.  Condition: stable       Plan  WBAT  No hip precautions  DVT prophylaxis  AP pelvis, R hip AP and cross table lateral, Scarlett dunbar-tom      Attending Attestation: I was present and scrubbed for the entire procedure.    Rico Kerr  Phone Number: [phone]

## 2024-09-27 LAB
BLOOD EXPIRATION DATE: NORMAL
DISPENSE STATUS: NORMAL
ERYTHROCYTE [DISTWIDTH] IN BLOOD BY AUTOMATED COUNT: 14.5 % (ref 11.5–14.5)
HCT VFR BLD AUTO: 21.8 % (ref 41–52)
HGB BLD-MCNC: 7.2 G/DL (ref 13.5–17.5)
MCH RBC QN AUTO: 32.6 PG (ref 26–34)
MCHC RBC AUTO-ENTMCNC: 33 G/DL (ref 32–36)
MCV RBC AUTO: 99 FL (ref 80–100)
NRBC BLD-RTO: 0 /100 WBCS (ref 0–0)
PLATELET # BLD AUTO: 135 X10*3/UL (ref 150–450)
PRODUCT BLOOD TYPE: 6200
PRODUCT CODE: NORMAL
RBC # BLD AUTO: 2.21 X10*6/UL (ref 4.5–5.9)
UNIT ABO: NORMAL
UNIT NUMBER: NORMAL
UNIT RH: NORMAL
UNIT VOLUME: 350
WBC # BLD AUTO: 10.4 X10*3/UL (ref 4.4–11.3)
XM INTEP: NORMAL

## 2024-09-27 PROCEDURE — 97110 THERAPEUTIC EXERCISES: CPT | Mod: GP,CQ

## 2024-09-27 PROCEDURE — 2500000001 HC RX 250 WO HCPCS SELF ADMINISTERED DRUGS (ALT 637 FOR MEDICARE OP)

## 2024-09-27 PROCEDURE — 1100000001 HC PRIVATE ROOM DAILY

## 2024-09-27 PROCEDURE — 97530 THERAPEUTIC ACTIVITIES: CPT | Mod: GO

## 2024-09-27 PROCEDURE — 2500000002 HC RX 250 W HCPCS SELF ADMINISTERED DRUGS (ALT 637 FOR MEDICARE OP, ALT 636 FOR OP/ED): Performed by: STUDENT IN AN ORGANIZED HEALTH CARE EDUCATION/TRAINING PROGRAM

## 2024-09-27 PROCEDURE — 2500000004 HC RX 250 GENERAL PHARMACY W/ HCPCS (ALT 636 FOR OP/ED): Performed by: STUDENT IN AN ORGANIZED HEALTH CARE EDUCATION/TRAINING PROGRAM

## 2024-09-27 PROCEDURE — 36415 COLL VENOUS BLD VENIPUNCTURE: CPT

## 2024-09-27 PROCEDURE — 97530 THERAPEUTIC ACTIVITIES: CPT | Mod: GP,CQ

## 2024-09-27 PROCEDURE — 85027 COMPLETE CBC AUTOMATED: CPT

## 2024-09-27 PROCEDURE — 2500000001 HC RX 250 WO HCPCS SELF ADMINISTERED DRUGS (ALT 637 FOR MEDICARE OP): Performed by: STUDENT IN AN ORGANIZED HEALTH CARE EDUCATION/TRAINING PROGRAM

## 2024-09-27 PROCEDURE — 97535 SELF CARE MNGMENT TRAINING: CPT | Mod: GO

## 2024-09-27 RX ORDER — CYCLOBENZAPRINE HCL 10 MG
10 TABLET ORAL 3 TIMES DAILY PRN
Status: DISCONTINUED | OUTPATIENT
Start: 2024-09-27 | End: 2024-09-29 | Stop reason: HOSPADM

## 2024-09-27 ASSESSMENT — PAIN - FUNCTIONAL ASSESSMENT
PAIN_FUNCTIONAL_ASSESSMENT: 0-10

## 2024-09-27 ASSESSMENT — COGNITIVE AND FUNCTIONAL STATUS - GENERAL
CLIMB 3 TO 5 STEPS WITH RAILING: A LOT
MOVING TO AND FROM BED TO CHAIR: A LOT
TURNING FROM BACK TO SIDE WHILE IN FLAT BAD: A LOT
STANDING UP FROM CHAIR USING ARMS: A LOT
MOVING FROM LYING ON BACK TO SITTING ON SIDE OF FLAT BED WITH BEDRAILS: A LOT
MOBILITY SCORE: 10
TOILETING: A LOT
STANDING UP FROM CHAIR USING ARMS: A LOT
WALKING IN HOSPITAL ROOM: TOTAL
DRESSING REGULAR UPPER BODY CLOTHING: A LOT
MOVING TO AND FROM BED TO CHAIR: A LOT
DAILY ACTIVITIY SCORE: 14
TURNING FROM BACK TO SIDE WHILE IN FLAT BAD: A LITTLE
MOVING FROM LYING ON BACK TO SITTING ON SIDE OF FLAT BED WITH BEDRAILS: A LITTLE
PERSONAL GROOMING: A LITTLE
MOBILITY SCORE: 14
HELP NEEDED FOR BATHING: A LOT
WALKING IN HOSPITAL ROOM: A LOT
CLIMB 3 TO 5 STEPS WITH RAILING: TOTAL
EATING MEALS: A LITTLE
DRESSING REGULAR LOWER BODY CLOTHING: A LOT

## 2024-09-27 ASSESSMENT — PAIN DESCRIPTION - ORIENTATION: ORIENTATION: RIGHT

## 2024-09-27 ASSESSMENT — PAIN SCALES - GENERAL
PAINLEVEL_OUTOF10: 6
PAINLEVEL_OUTOF10: 5 - MODERATE PAIN
PAINLEVEL_OUTOF10: 5 - MODERATE PAIN
PAINLEVEL_OUTOF10: 7
PAINLEVEL_OUTOF10: 7
PAINLEVEL_OUTOF10: 6
PAINLEVEL_OUTOF10: 5 - MODERATE PAIN
PAINLEVEL_OUTOF10: 5 - MODERATE PAIN
PAINLEVEL_OUTOF10: 6

## 2024-09-27 ASSESSMENT — PAIN DESCRIPTION - LOCATION: LOCATION: HIP

## 2024-09-27 ASSESSMENT — ACTIVITIES OF DAILY LIVING (ADL): HOME_MANAGEMENT_TIME_ENTRY: 10

## 2024-09-27 NOTE — PROGRESS NOTES
"Orthopaedic Surgery Progress Note    S:  No acute events overnight. Pain well controlled. Denies chest pain, shortness of breath, or fevers.    O:  /52 (BP Location: Left arm, Patient Position: Lying)   Pulse 78   Temp 37.2 °C (99 °F) (Temporal)   Resp 17   Ht 1.803 m (5' 11\")   Wt 68.1 kg (150 lb 2.1 oz)   SpO2 95%   BMI 20.94 kg/m²     Gen: arousable, NAD, appropriately conversational  Cardiac: extremities warm  Resp: nonlabored on RA  GI: soft, nondistended    MSK:  Right Lower Extremity:   -Dressing c/d/i  -Fires DF/PF/EHL/FHL  -SILT in saph/sural/SPN/DPN distributions  -Foot warm, well perfused  -Palpable DP pulse, brisk cap refill  -Compartments soft and compressible  - leg lengths approximately equal    A/P: 69 y.o. male s/p R hip MELISSA/arthroplasty, ORIF R acetabulum on 9/25 with Dr. Kerr.      Plan:  - Weight bearing: WBAT RLE  - AP pelvis pending  - DVT ppx: SCDs, ASA 81 bid  - Diet: Regular  - Pain: Tylenol, oxycodone 5/10  - Antibiotics: perioperative ancef 2g q8hr x3 doses  - FEN: HLIV with good PO intake  - Bowel Regimen: Colace, senna, dulcolax  - PT/OT  - Pulm: Encourage IS  - Continue home medications  - No peñaloza    Dispo: SNF pending choices    Kristofer Swenson MD  PGY-3 Orthopedic Surgery  [facility]  [employer] Preferred  Pager: [phone]    While inpatient, this patient will be followed by the Orthopaedic Trauma team. Please see contact information below:    First call: Jose Delgado, PGY-1  Second call: Jessee Feliciano, PGY-2   Third call: Kristofer Swenson PGY-3    6pm-6am M-F, Holidays, and weekends page Ortho on-call @[phone] with urgent questions/concerns.          "

## 2024-09-27 NOTE — PROGRESS NOTES
GAW met with patient at bedside to update on high intensity PT recommendation. Patient is agreeable to acute rehab. FOC is [facility]. Referral submitted for review.     CANDIDO Mckeon

## 2024-09-27 NOTE — PROGRESS NOTES
Occupational Therapy    Occupational Therapy Treatment    Name: Nasir Camacho  MRN: [id number]  Department: [facility] 6  Room: [facility]6028  Date: 09/27/24  Time Calculation  Start Time: 0947  Stop Time: 1010  Time Calculation (min): 23 min    Assessment:  OT Assessment: difficulty I/ADLs, safety, fxnl mob  Prognosis: Good  Barriers to Discharge: None  Evaluation/Treatment Tolerance: Patient limited by fatigue, Patient limited by pain  Medical Staff Made Aware: Yes  End of Session Communication: Bedside nurse  End of Session Patient Position: Bed, 3 rail up, Alarm on  Plan:  Treatment Interventions: ADL retraining, Functional transfer training, UE strengthening/ROM, Endurance training, Patient/family training, Equipment evaluation/education, Compensatory technique education  OT Frequency: 3 times per week  OT Discharge Recommendations: High intensity level of continued care  Equipment Recommended upon Discharge:  (hip kit)  OT Recommended Transfer Status: Assist of 2  OT - OK to Discharge: Yes    Subjective   Previous Visit Info:  OT Last Visit  OT Received On: 09/27/24  General:  General  Reason for Referral: s/p R hip MELISSA/arthroplasty, ORIF R acetabulum  Past Medical History Relevant to Rehab: CAD, NSTEMI s/p LAD PCI 1999, aortic stenosis s/p TAVR, HTN, HLD, hemorraghic stroke with right hemiparesis, pulmonary nodule.  Family/Caregiver Present: No  Co-Treatment: PT  Co-Treatment Reason: maximize pt safety  Prior to Session Communication: Bedside nurse  Patient Position Received: Bed, 3 rail up, Alarm on  General Comment: pleasant, cooperative, extended time mobility required in session  Precautions:  Hearing/Visual Limitations: Glasses on during session  LE Weight Bearing Status: Weight Bearing as Tolerated (RLE)  Medical Precautions: Fall precautions    Vital Signs (Past 2hrs)        Date/Time Vitals Session Patient Position Pulse Resp SpO2 BP MAP (mmHg)    09/27/24 0947 During OT  Lying  70  --  96 %  143/65  --      09/27/24 0958 --  --  --  --  --  143/65  --     09/27/24 1003 --  --  --  --  --  95/56  --     09/27/24 1006 --  --  --  --  --  140/73  --                   Vital Signs Comment: standing BP 95/56, sup post mob 140/73 RN notified    Pain Assessment:  Pain Assessment  Pain Assessment: 0-10  0-10 (Numeric) Pain Score: 6  Pain Type: Surgical pain  Pain Location:  (R hip)     Objective   Cognition:  Overall Cognitive Status: Within Functional Limits  Orientation Level: Oriented X4  Following Commands: Follows all commands and directions without difficulty  Safety Judgment: Decreased awareness of need for assistance  Insight: Mild  Activities of Daily Living: Feeding  Feeding Level of Assistance:  (mod A cut up pancakes and banana I self feeding)    UE Dressing  UE Dressing Level of Assistance:  (mod A adjust gown EOB)        Bed Mobility/Transfers: Bed Mobility 1  Level of Assistance 1:  (sup to sit mod A x2, sit to sup max A x2, boost max A x2)    Transfer 1  Transfer Level of Assistance 1:  (sit/stand max A x2 WhW)      Standing Balance:  Dynamic Standing Balance  Dynamic Standing-Level of Assistance:  (seated EOB extended time 2/2 pain CGA-MIN)       Outcome Measures:  [facility] Daily Activity  Putting on and taking off regular lower body clothing: A lot  Bathing (including washing, rinsing, drying): A lot  Putting on and taking off regular upper body clothing: A lot  Toileting, which includes using toilet, bedpan or urinal: A lot  Taking care of personal grooming such as brushing teeth: A little  Eating Meals: A little  Daily Activity - Total Score: 14        Education Documentation  Handouts, taught by Kristi Wade OT at 9/27/2024 11:36 AM.  Learner: Patient  Readiness: Acceptance  Method: Explanation  Response: Verbalizes Understanding, Needs Reinforcement    Body Mechanics, taught by Kristi Wade OT at 9/27/2024 11:36 AM.  Learner: Patient  Readiness: Acceptance  Method: Explanation  Response: Verbalizes  Understanding, Needs Reinforcement    Precautions, taught by Kristi Wade OT at 9/27/2024 11:36 AM.  Learner: Patient  Readiness: Acceptance  Method: Explanation  Response: Verbalizes Understanding, Needs Reinforcement    ADL Training, taught by Kristi Wade OT at 9/27/2024 11:36 AM.  Learner: Patient  Readiness: Acceptance  Method: Explanation  Response: Verbalizes Understanding, Needs Reinforcement    Education Comments  No comments found.      Goals:  Encounter Problems       Encounter Problems (Active)       ADLs       Patient will perform UB and LB bathing  with stand by assist level of assistance and AE. (Progressing)       Start:  09/26/24    Expected End:  10/17/24            Patient with complete upper body dressing with independent level  (Progressing)       Start:  09/26/24    Expected End:  10/17/24            Patient with complete lower body dressing with contact guard assist level of assistance donning and doffing all LE clothes  with PRN adaptive equipment  (Progressing)       Start:  09/26/24    Expected End:  10/17/24            Patient will complete daily grooming tasks  with independent level  (Progressing)       Start:  09/26/24    Expected End:  10/17/24            Patient will complete toileting including hygiene clothing management/hygiene with minimal assist  level of assistance and LRD. (Progressing)       Start:  09/26/24    Expected End:  10/17/24               EXERCISE/STRENGTHENING       Patient with increase BUE to WFL strength. (Progressing)       Start:  09/26/24    Expected End:  10/17/24               MOBILITY       Patient will perform Functional mobility mod  Household distances/Community Distances with minimal assist  level of assistance and least restrictive device in order to improve safety and functional mobility. (Progressing)       Start:  09/26/24    Expected End:  10/17/24               TRANSFERS       Patient will perform bed mobility contact guard assist level  of assistance and bed rails in order to improve safety and independence with mobility (Progressing)       Start:  09/26/24    Expected End:  10/17/24            Patient will complete functional transfer least restrictive device with minimal assist  level of assistance. (Progressing)       Start:  09/26/24    Expected End:  10/17/24

## 2024-09-27 NOTE — NURSING NOTE
Discussed with patient about postoperative course once discharged from the hospital. Appropriate contact information was given to the patient. They were advised to call for any questions or concerns.

## 2024-09-27 NOTE — PROGRESS NOTES
Physical Therapy    Physical Therapy Treatment    Patient Name: Nasir Camacho  MRN: [id number]  Department: [place] [address]  Room: [facility]6028-  Today's Date: 9/27/2024  Time Calculation  Start Time: 0953  Stop Time: 1016  Time Calculation (min): 23 min         Assessment/Plan   PT Assessment  End of Session Communication: Bedside nurse  Assessment Comment: .  End of Session Patient Position: Bed, 3 rail up, Alarm on     PT Plan  Treatment/Interventions: Bed mobility, Transfer training, Gait training, Stair training, Balance training, Strengthening, Endurance training, Range of motion, Therapeutic exercise, Therapeutic activity  PT Plan: Ongoing PT  PT Frequency: Daily  PT Discharge Recommendations: High intensity level of continued care  Equipment Recommended upon Discharge:  (Owns)  PT Recommended Transfer Status: Assist x2, Assistive device  PT - OK to Discharge: Yes      General Visit Information:   PT  Visit  PT Received On: 09/27/24  General  Co-Treatment: OT  Co-Treatment Reason: maximize pt safety  Prior to Session Communication: Bedside nurse  Patient Position Received: Bed, 3 rail up, Alarm on  General Comment: pleasant, cooperative, extended time mobility required in session    Subjective   Precautions:  Precautions  LE Weight Bearing Status: Weight Bearing as Tolerated  Medical Precautions: Fall precautions    Vital Signs (Past 2hrs)                 Objective   Pain:  Pain Assessment  0-10 (Numeric) Pain Score: 5 - Moderate pain  Cognition:  Cognition  Orientation Level: Oriented X4       Treatments:  Therapeutic Exercise  Therapeutic Exercise Performed: Yes  Therapeutic Exercise Activity 1: instructed pt in supine quad sets and AP x10 reps AROM, pt requiring additional time to attain knee extension secondary to LE tone    Bed Mobility 1  Bed Mobility 1: Supine to sitting, Sitting to supine  Level of Assistance 1:  (sup to sit mod A x2, sit to sup max A x2, boost max A x2)       Transfer 1  Technique 1: Sit to  stand, Stand to sit  Transfer Device 1: Walker  Transfer Level of Assistance 1: Maximum assistance, +2, Minimal verbal cues  Trials/Comments 1: cues for hands palcement and to attain full standing position         Outcome Measures:  [facility] Basic Mobility  Turning from your back to your side while in a flat bed without using bedrails: A lot  Moving from lying on your back to sitting on the side of a flat bed without using bedrails: A lot  Moving to and from bed to chair (including a wheelchair): A lot  Standing up from a chair using your arms (e.g. wheelchair or bedside chair): A lot  To walk in hospital room: Total  Climbing 3-5 steps with railing: Total  Basic Mobility - Total Score: 10    Education Documentation  Mobility Training, taught by Kai Simmons PTA at 9/27/2024 12:25 PM.  Learner: Patient  Readiness: Acceptance  Method: Explanation  Response: Verbalizes Understanding    Body Mechanics, taught by Kai Simmons PTA at 9/27/2024 12:23 PM.  Learner: Patient  Readiness: Acceptance  Method: Explanation  Response: Verbalizes Understanding    Mobility Training, taught by Kai Simmons PTA at 9/27/2024 12:22 PM.  Learner: Patient  Readiness: Acceptance  Method: Explanation  Response: Verbalizes Understanding    Education Comments  No comments found.        OP EDUCATION:       Encounter Problems       Encounter Problems (Active)       Mobility       LTG - Patient will ambulate household distance with Min A and LRD (Progressing)       Start:  09/26/24    Expected End:  10/10/24            LTG - Patient will navigate 4 steps with Min A and rails/device (Progressing)       Start:  09/26/24    Expected End:  10/10/24               PT Transfers       STG - Transfer from bed to chair with CGA and LRD (Progressing)       Start:  09/26/24    Expected End:  10/10/24            STG - Patient will perform bed mobility with Modified independence (Progressing)       Start:  09/26/24    Expected End:  10/10/24             STG - Patient will transfer sit to and from stand with CGA and LRD (Progressing)       Start:  09/26/24    Expected End:  10/10/24               Pain - Adult

## 2024-09-27 NOTE — CARE PLAN
The patient's goals for the shift include  Control Pain     The clinical goals for the shift include Patient will remain comfortable throughout shift.      Problem: Skin  Goal: Prevent/manage excess moisture  Outcome: Progressing     Problem: Skin  Goal: Prevent/minimize sheer/friction injuries  Outcome: Progressing     Problem: Fall/Injury  Goal: Not fall by end of shift  Outcome: Progressing  Goal: Be free from injury by end of the shift  Outcome: Progressing  Goal: Verbalize understanding of personal risk factors for fall in the hospital  Outcome: Progressing  Goal: Verbalize understanding of risk factor reduction measures to prevent injury from fall in the home  Outcome: Progressing  Goal: Use assistive devices by end of the shift  Outcome: Progressing  Goal: Pace activities to prevent fatigue by end of the shift  Outcome: Progressing     Problem: Pain - Adult  Goal: Verbalizes/displays adequate comfort level or baseline comfort level  Outcome: Progressing     Problem: Safety - Adult  Goal: Free from fall injury  Outcome: Progressing     Problem: Chronic Conditions and Co-morbidities  Goal: Patient's chronic conditions and co-morbidity symptoms are monitored and maintained or improved  Outcome: Progressing

## 2024-09-28 LAB
ALBUMIN SERPL BCP-MCNC: 3.3 G/DL (ref 3.4–5)
ANION GAP SERPL CALC-SCNC: 11 MMOL/L (ref 10–20)
BLOOD EXPIRATION DATE: NORMAL
BUN SERPL-MCNC: 23 MG/DL (ref 6–23)
CALCIUM SERPL-MCNC: 8.9 MG/DL (ref 8.6–10.6)
CHLORIDE SERPL-SCNC: 102 MMOL/L (ref 98–107)
CO2 SERPL-SCNC: 28 MMOL/L (ref 21–32)
CREAT SERPL-MCNC: 0.85 MG/DL (ref 0.5–1.3)
DISPENSE STATUS: NORMAL
EGFRCR SERPLBLD CKD-EPI 2021: >90 ML/MIN/1.73M*2
ERYTHROCYTE [DISTWIDTH] IN BLOOD BY AUTOMATED COUNT: 13.8 % (ref 11.5–14.5)
GLUCOSE SERPL-MCNC: 120 MG/DL (ref 74–99)
HCT VFR BLD AUTO: 21.7 % (ref 41–52)
HGB BLD-MCNC: 7 G/DL (ref 13.5–17.5)
MCH RBC QN AUTO: 31.7 PG (ref 26–34)
MCHC RBC AUTO-ENTMCNC: 32.3 G/DL (ref 32–36)
MCV RBC AUTO: 98 FL (ref 80–100)
NRBC BLD-RTO: 0 /100 WBCS (ref 0–0)
PHOSPHATE SERPL-MCNC: 2.9 MG/DL (ref 2.5–4.9)
PLATELET # BLD AUTO: 145 X10*3/UL (ref 150–450)
POTASSIUM SERPL-SCNC: 4 MMOL/L (ref 3.5–5.3)
PRODUCT BLOOD TYPE: 6200
PRODUCT CODE: NORMAL
RBC # BLD AUTO: 2.21 X10*6/UL (ref 4.5–5.9)
SODIUM SERPL-SCNC: 137 MMOL/L (ref 136–145)
UNIT ABO: NORMAL
UNIT NUMBER: NORMAL
UNIT RH: NORMAL
UNIT VOLUME: 350
WBC # BLD AUTO: 10 X10*3/UL (ref 4.4–11.3)
XM INTEP: NORMAL

## 2024-09-28 PROCEDURE — 36415 COLL VENOUS BLD VENIPUNCTURE: CPT

## 2024-09-28 PROCEDURE — 97530 THERAPEUTIC ACTIVITIES: CPT | Mod: GP,CQ

## 2024-09-28 PROCEDURE — 2500000001 HC RX 250 WO HCPCS SELF ADMINISTERED DRUGS (ALT 637 FOR MEDICARE OP)

## 2024-09-28 PROCEDURE — 2500000001 HC RX 250 WO HCPCS SELF ADMINISTERED DRUGS (ALT 637 FOR MEDICARE OP): Performed by: STUDENT IN AN ORGANIZED HEALTH CARE EDUCATION/TRAINING PROGRAM

## 2024-09-28 PROCEDURE — 36430 TRANSFUSION BLD/BLD COMPNT: CPT

## 2024-09-28 PROCEDURE — P9016 RBC LEUKOCYTES REDUCED: HCPCS

## 2024-09-28 PROCEDURE — 85027 COMPLETE CBC AUTOMATED: CPT

## 2024-09-28 PROCEDURE — 1100000001 HC PRIVATE ROOM DAILY

## 2024-09-28 PROCEDURE — 2500000004 HC RX 250 GENERAL PHARMACY W/ HCPCS (ALT 636 FOR OP/ED)

## 2024-09-28 PROCEDURE — 80069 RENAL FUNCTION PANEL: CPT

## 2024-09-28 PROCEDURE — 2500000002 HC RX 250 W HCPCS SELF ADMINISTERED DRUGS (ALT 637 FOR MEDICARE OP, ALT 636 FOR OP/ED): Performed by: STUDENT IN AN ORGANIZED HEALTH CARE EDUCATION/TRAINING PROGRAM

## 2024-09-28 PROCEDURE — 2500000004 HC RX 250 GENERAL PHARMACY W/ HCPCS (ALT 636 FOR OP/ED): Performed by: STUDENT IN AN ORGANIZED HEALTH CARE EDUCATION/TRAINING PROGRAM

## 2024-09-28 RX ORDER — ENOXAPARIN SODIUM 100 MG/ML
40 INJECTION SUBCUTANEOUS EVERY 24 HOURS
Status: DISCONTINUED | OUTPATIENT
Start: 2024-09-28 | End: 2024-09-29 | Stop reason: HOSPADM

## 2024-09-28 RX ORDER — OXYCODONE HYDROCHLORIDE 5 MG/1
10 TABLET ORAL EVERY 4 HOURS PRN
Status: DISCONTINUED | OUTPATIENT
Start: 2024-09-28 | End: 2024-09-29 | Stop reason: HOSPADM

## 2024-09-28 RX ORDER — OXYCODONE HYDROCHLORIDE 5 MG/1
10 TABLET ORAL EVERY 6 HOURS PRN
Status: DISCONTINUED | OUTPATIENT
Start: 2024-09-28 | End: 2024-09-28

## 2024-09-28 RX ORDER — OXYCODONE HYDROCHLORIDE 5 MG/1
5 TABLET ORAL EVERY 6 HOURS PRN
Qty: 28 TABLET | Refills: 0 | Status: SHIPPED | OUTPATIENT
Start: 2024-09-28

## 2024-09-28 RX ORDER — OXYCODONE HYDROCHLORIDE 5 MG/1
5 TABLET ORAL EVERY 4 HOURS PRN
Status: DISCONTINUED | OUTPATIENT
Start: 2024-09-28 | End: 2024-09-29 | Stop reason: HOSPADM

## 2024-09-28 RX ORDER — ACETAMINOPHEN 325 MG/1
650 TABLET ORAL EVERY 6 HOURS PRN
Qty: 60 TABLET | Refills: 3 | Status: SHIPPED | OUTPATIENT
Start: 2024-09-28

## 2024-09-28 RX ORDER — ENOXAPARIN SODIUM 100 MG/ML
40 INJECTION SUBCUTANEOUS ONCE
Status: DISCONTINUED | OUTPATIENT
Start: 2024-09-28 | End: 2024-09-28

## 2024-09-28 RX ORDER — DOCUSATE SODIUM 100 MG/1
100 CAPSULE, LIQUID FILLED ORAL 2 TIMES DAILY
Qty: 60 CAPSULE | Refills: 3 | Status: SHIPPED | OUTPATIENT
Start: 2024-09-28

## 2024-09-28 RX ORDER — PNV NO.95/FERROUS FUM/FOLIC AC 28MG-0.8MG
1 TABLET ORAL 2 TIMES DAILY
Qty: 60 TABLET | Refills: 3 | Status: SHIPPED | OUTPATIENT
Start: 2024-09-28

## 2024-09-28 RX ORDER — ENOXAPARIN SODIUM 100 MG/ML
40 INJECTION SUBCUTANEOUS DAILY
Qty: 28 EACH | Refills: 0 | Status: SHIPPED | OUTPATIENT
Start: 2024-09-28 | End: 2024-10-26

## 2024-09-28 ASSESSMENT — COGNITIVE AND FUNCTIONAL STATUS - GENERAL
MOVING FROM LYING ON BACK TO SITTING ON SIDE OF FLAT BED WITH BEDRAILS: A LOT
MOBILITY SCORE: 14
TURNING FROM BACK TO SIDE WHILE IN FLAT BAD: A LOT
TURNING FROM BACK TO SIDE WHILE IN FLAT BAD: A LITTLE
DAILY ACTIVITIY SCORE: 18
CLIMB 3 TO 5 STEPS WITH RAILING: TOTAL
TOILETING: A LITTLE
DRESSING REGULAR UPPER BODY CLOTHING: A LITTLE
CLIMB 3 TO 5 STEPS WITH RAILING: A LOT
STANDING UP FROM CHAIR USING ARMS: A LOT
WALKING IN HOSPITAL ROOM: TOTAL
HELP NEEDED FOR BATHING: A LITTLE
EATING MEALS: A LITTLE
STANDING UP FROM CHAIR USING ARMS: A LOT
MOVING FROM LYING ON BACK TO SITTING ON SIDE OF FLAT BED WITH BEDRAILS: A LITTLE
MOBILITY SCORE: 10
DRESSING REGULAR LOWER BODY CLOTHING: A LITTLE
WALKING IN HOSPITAL ROOM: A LOT
PERSONAL GROOMING: A LITTLE
MOVING TO AND FROM BED TO CHAIR: A LOT
MOVING TO AND FROM BED TO CHAIR: A LOT

## 2024-09-28 ASSESSMENT — PAIN SCALES - GENERAL
PAINLEVEL_OUTOF10: 4
PAINLEVEL_OUTOF10: 3
PAINLEVEL_OUTOF10: 4
PAINLEVEL_OUTOF10: 5 - MODERATE PAIN
PAINLEVEL_OUTOF10: 6
PAINLEVEL_OUTOF10: 7

## 2024-09-28 ASSESSMENT — PAIN - FUNCTIONAL ASSESSMENT
PAIN_FUNCTIONAL_ASSESSMENT: 0-10

## 2024-09-28 NOTE — PROGRESS NOTES
"Orthopaedic Surgery Progress Note    S:  No acute events overnight. Pain well controlled. Denies chest pain, shortness of breath, or fevers. Orthostatic with PT yesterday, Hgb 7.0 this am from 7.2 yesterday.    O:  /61 (BP Location: Left arm, Patient Position: Lying)   Pulse 72   Temp 37.1 °C (98.8 °F) (Temporal)   Resp 18   Ht 1.803 m (5' 11\")   Wt 68.1 kg (150 lb 2.1 oz)   SpO2 94%   BMI 20.94 kg/m²     Gen: arousable, NAD, appropriately conversational  Cardiac: extremities warm  Resp: nonlabored on RA  GI: soft, nondistended    MSK:  Right Lower Extremity:   -Dressing c/d/i  -Fires DF/PF/EHL/FHL  -SILT in saph/sural/SPN/DPN distributions  -Foot warm, well perfused  -Palpable DP pulse, brisk cap refill  -Compartments soft and compressible  - leg lengths approximately equal    A/P: 69 y.o. male s/p R hip MELISSA/arthroplasty, ORIF R acetabulum on 9/25 with Dr. Kerr.      Plan:  - Weight bearing: WBAT RLE  - AP pelvis pending  - DVT ppx: SCDs, ASA 81 bid  - Diet: Regular  - Pain: Tylenol, oxycodone 5/10  - Antibiotics: perioperative ancef 2g q8hr x3 doses  - FEN: HLIV with good PO intake  - Bowel Regimen: Colace, senna, dulcolax  - PT/OT  - Pulm: Encourage IS  - Continue home medications  - No peñaloza    Dispo: accepted to SNF, pending precert, pending repeat CBC in am     Kristofer Swenson MD  PGY-3 Orthopedic Surgery  [facility]  Epic Chat Preferred  Pager: [phone]    While inpatient, this patient will be followed by the Orthopaedic Trauma team. Please see contact information below:    First call: Jose Delgado, PGY-1  Second call: Jessee Feliciano, PGY-2   Third call: Kristofer Swenson, PGY-3    6pm-6am M-F, Holidays, and weekends page Ortho on-call @[phone] with urgent questions/concerns.          "

## 2024-09-28 NOTE — CARE PLAN
The patient's goals for the shift include      The clinical goals for the shift include Patient will remain safe

## 2024-09-28 NOTE — CARE PLAN
The clinical goals for the shift include patients pain will remain well managed through end of shift    Problem: Skin  Goal: Decreased wound size/increased tissue granulation at next dressing change  Outcome: Progressing  Goal: Participates in plan/prevention/treatment measures  Outcome: Progressing  Goal: Prevent/manage excess moisture  Outcome: Progressing  Goal: Prevent/minimize sheer/friction injuries  Outcome: Progressing  Goal: Promote/optimize nutrition  Outcome: Progressing  Goal: Promote skin healing  Outcome: Progressing     Problem: Fall/Injury  Goal: Not fall by end of shift  Outcome: Progressing  Goal: Be free from injury by end of the shift  Outcome: Progressing  Goal: Verbalize understanding of personal risk factors for fall in the hospital  Outcome: Progressing  Goal: Verbalize understanding of risk factor reduction measures to prevent injury from fall in the home  Outcome: Progressing  Goal: Use assistive devices by end of the shift  Outcome: Progressing  Goal: Pace activities to prevent fatigue by end of the shift  Outcome: Progressing     Problem: Pain - Adult  Goal: Verbalizes/displays adequate comfort level or baseline comfort level  Outcome: Progressing     Problem: Safety - Adult  Goal: Free from fall injury  Outcome: Progressing     Problem: Discharge Planning  Goal: Discharge to home or other facility with appropriate resources  Outcome: Progressing     Problem: Chronic Conditions and Co-morbidities  Goal: Patient's chronic conditions and co-morbidity symptoms are monitored and maintained or improved  Outcome: Progressing     Problem: Nutrition  Goal: Electrolytes WNL  Outcome: Progressing  Goal: Promote healing  Outcome: Progressing  Goal: Maintain stable weight  Outcome: Progressing

## 2024-09-28 NOTE — PROGRESS NOTES
Patient to discharge today  @[phone] going to [facility] patient nurse facility medical team  notified [facility]  is providing [facility]   Update hayder will leave 9-29 -2024 @4pm

## 2024-09-28 NOTE — PROGRESS NOTES
Physical Therapy    Physical Therapy Treatment    Patient Name: Nasir Camacho  MRN: [id number]  Department: [place] [address]  Room: 6028/6028-A  Today's Date: 9/28/2024  Time Calculation  Start Time: 1032  Stop Time: 1107  Time Calculation (min): 35 min         Assessment/Plan   PT Assessment  PT Assessment Results: Decreased strength, Decreased range of motion, Decreased endurance, Impaired balance, Impaired tone, Pain  Rehab Prognosis: Excellent  Evaluation/Treatment Tolerance: Patient tolerated treatment well  Medical Staff Made Aware: Yes  End of Session Communication: Bedside nurse  Assessment Comment: Pt offers good effort, vitals stable this date. Remains appropriate for high intensity therapy  End of Session Patient Position: Bed, 3 rail up, Alarm off, not on at start of session     PT Plan  Treatment/Interventions: Bed mobility, Transfer training, Gait training, Stair training, Balance training, Strengthening, Endurance training, Range of motion, Therapeutic exercise, Therapeutic activity  PT Plan: Ongoing PT  PT Frequency: Daily  PT Discharge Recommendations: High intensity level of continued care  Equipment Recommended upon Discharge:  (Owns)  PT Recommended Transfer Status: Assist x2, Assistive device  PT - OK to Discharge: Yes      General Visit Information:   PT  Visit  PT Received On: 09/28/24  Response to Previous Treatment: Patient with no complaints from previous session.  General  Family/Caregiver Present: Yes  Caregiver Feedback: wife present and supportive  Prior to Session Communication: Bedside nurse  Patient Position Received: Bed, 3 rail up  General Comment: Pt supine in bed, pleasant and agreeable to therapy  Per handoff with RN, pt is appropriate for therapy, vitals are stable and pain is controlled. Other concerns prior to tx are: none    Subjective   Precautions:  Precautions  LE Weight Bearing Status: Weight Bearing as Tolerated  Medical Precautions: Fall precautions    Vital Signs (Past 2hrs)         Date/Time Vitals Session Patient Position Pulse Resp SpO2 BP MAP (mmHg)    09/28/24 1032 During PT  Lying  --  --  --  134/61  --     09/28/24 1034 During PT  Sitting  --  --  --  132/56  --                   Objective   Pain:  Pain Assessment  Pain Assessment: 0-10  0-10 (Numeric) Pain Score: 4  Pain Location: Leg  Pain Orientation: Right  Cognition:  Cognition  Overall Cognitive Status: Within Functional Limits  Orientation Level: Oriented X4    Treatments:  Therapeutic Exercise  Therapeutic Exercise Performed: Yes  Therapeutic Exercise Activity 1: Supine B LE AP, AA R LE heel slides 2x10, seated R LE LAQs 10x with AA for increased ROM    Therapeutic Activity  Therapeutic Activity Performed: Yes  Therapeutic Activity 1: Significant time spent educating pt on seated pushusp at EOB, seated scoot at EOB, focus on ant weight shift    Bed Mobility  Bed Mobility: Yes  Bed Mobility 1  Bed Mobility 1: Supine to sitting  Level of Assistance 1: Moderate assistance (x1)  Bed Mobility Comments 1: logroll technique with HOB flat, heavy use of rail  Bed Mobility 2  Bed Mobility  2: Sitting to supine  Level of Assistance 2: Moderate assistance       Transfers  Transfer: Yes  Transfer 1  Transfer From 1: Sit to, Stand to  Transfer to 1: Sit  Technique 1: Sit to stand, Stand to sit  Transfer Device 1: Walker, Gait belt  Transfer Level of Assistance 1: Moderate assistance (x2)    Outcome Measures:     [facility] Basic Mobility  Turning from your back to your side while in a flat bed without using bedrails: A lot  Moving from lying on your back to sitting on the side of a flat bed without using bedrails: A lot  Moving to and from bed to chair (including a wheelchair): A lot  Standing up from a chair using your arms (e.g. wheelchair or bedside chair): A lot  To walk in hospital room: Total  Climbing 3-5 steps with railing: Total  Basic Mobility - Total Score: 10    Education Documentation  Precautions, taught by Bharati Foley PTA  at 9/28/2024 11:21 AM.  Learner: Patient  Readiness: Acceptance  Method: Explanation, Demonstration  Response: Verbalizes Understanding, Demonstrated Understanding    Body Mechanics, taught by Bharati Foley PTA at 9/28/2024 11:21 AM.  Learner: Patient  Readiness: Acceptance  Method: Explanation, Demonstration  Response: Verbalizes Understanding, Demonstrated Understanding    Mobility Training, taught by Bharati Foley PTA at 9/28/2024 11:21 AM.  Learner: Patient  Readiness: Acceptance  Method: Explanation, Demonstration  Response: Verbalizes Understanding, Demonstrated Understanding    Education Comments  No comments found.        OP EDUCATION:       Encounter Problems       Encounter Problems (Active)       Mobility       LTG - Patient will ambulate household distance with Min A and LRD (Progressing)       Start:  09/26/24    Expected End:  10/10/24            LTG - Patient will navigate 4 steps with Min A and rails/device (Progressing)       Start:  09/26/24    Expected End:  10/10/24               PT Transfers       STG - Transfer from bed to chair with CGA and LRD (Progressing)       Start:  09/26/24    Expected End:  10/10/24            STG - Patient will perform bed mobility with Modified independence (Progressing)       Start:  09/26/24    Expected End:  10/10/24            STG - Patient will transfer sit to and from stand with CGA and LRD (Progressing)       Start:  09/26/24    Expected End:  10/10/24               Pain - Adult            ERICKA Palomares

## 2024-09-29 VITALS
SYSTOLIC BLOOD PRESSURE: 170 MMHG | BODY MASS INDEX: 21.02 KG/M2 | WEIGHT: 150.13 LBS | RESPIRATION RATE: 16 BRPM | DIASTOLIC BLOOD PRESSURE: 67 MMHG | TEMPERATURE: 98.4 F | HEART RATE: 79 BPM | OXYGEN SATURATION: 96 % | HEIGHT: 71 IN

## 2024-09-29 LAB
ERYTHROCYTE [DISTWIDTH] IN BLOOD BY AUTOMATED COUNT: 14.6 % (ref 11.5–14.5)
HCT VFR BLD AUTO: 24.4 % (ref 41–52)
HGB BLD-MCNC: 7.9 G/DL (ref 13.5–17.5)
MCH RBC QN AUTO: 31.3 PG (ref 26–34)
MCHC RBC AUTO-ENTMCNC: 32.4 G/DL (ref 32–36)
MCV RBC AUTO: 97 FL (ref 80–100)
NRBC BLD-RTO: 0 /100 WBCS (ref 0–0)
PLATELET # BLD AUTO: 147 X10*3/UL (ref 150–450)
RBC # BLD AUTO: 2.52 X10*6/UL (ref 4.5–5.9)
WBC # BLD AUTO: 9.2 X10*3/UL (ref 4.4–11.3)

## 2024-09-29 PROCEDURE — 36415 COLL VENOUS BLD VENIPUNCTURE: CPT

## 2024-09-29 PROCEDURE — 2500000002 HC RX 250 W HCPCS SELF ADMINISTERED DRUGS (ALT 637 FOR MEDICARE OP, ALT 636 FOR OP/ED): Performed by: STUDENT IN AN ORGANIZED HEALTH CARE EDUCATION/TRAINING PROGRAM

## 2024-09-29 PROCEDURE — 2500000004 HC RX 250 GENERAL PHARMACY W/ HCPCS (ALT 636 FOR OP/ED)

## 2024-09-29 PROCEDURE — 85027 COMPLETE CBC AUTOMATED: CPT

## 2024-09-29 PROCEDURE — 2500000001 HC RX 250 WO HCPCS SELF ADMINISTERED DRUGS (ALT 637 FOR MEDICARE OP): Performed by: STUDENT IN AN ORGANIZED HEALTH CARE EDUCATION/TRAINING PROGRAM

## 2024-09-29 PROCEDURE — 2500000001 HC RX 250 WO HCPCS SELF ADMINISTERED DRUGS (ALT 637 FOR MEDICARE OP)

## 2024-09-29 ASSESSMENT — PAIN SCALES - GENERAL
PAINLEVEL_OUTOF10: 3
PAINLEVEL_OUTOF10: 7
PAINLEVEL_OUTOF10: 8
PAINLEVEL_OUTOF10: 7
PAINLEVEL_OUTOF10: 6

## 2024-09-29 NOTE — CARE PLAN
Problem: Skin  Goal: Decreased wound size/increased tissue granulation at next dressing change  Outcome: Progressing     Problem: Skin  Goal: Prevent/minimize sheer/friction injuries  Outcome: Progressing   The patient's goals for the shift include      The clinical goals for the shift include Patient will remain safe    No acute events. Voiding without difficulty. Medicated for pain as needed. Dressing intact. Assisted with repositioning as needed. Per Orthopedics, ok to not have 2 hour post-transfusion CBC. OK to wait for AM CBC. Discharge to SNF pending CBC. Patient resting between care.

## 2024-09-29 NOTE — DISCHARGE SUMMARY
"Discharge Diagnosis  Post-traumatic osteoarthritis of right hip    Issues Requiring Follow-Up  Please follow-up with Ivory Mederos PA-C, as scheduled 10/14    Test Results Pending At Discharge  Pending Labs       No current pending labs.            Hospital Course  69 year-old male who presented with right hip arthritis. Patient is now s/p right hip removal of hardware and right total hip arthroplasty, ORIF R acetabulum on 9/25 by Dr. Kerr. On the day of surgery, patient was identified in the pre-operative holding area and agreeable to proceed with surgery. Written consent was obtained.  Please see operative note for further details of this procedure. Patient received 24 hours of john paul-operative antibiotics. Patient recovered in the PACU before transfer to a regular nursing floor. Patient was started on oxycodone, tylenol, and dilaudid for pain control and ASA 81 mg bid for DVT prophylaxis. Physical therapy recommended continued recovery at acute rehab with continued physical therapy and wound care. However, insurance was denied and patient was discharged to a skilled nursing facility. On the day of discharge, patient was afebrile with stable vital signs. Patient was neurovascularly intact at time of discharge. Patient was discharged with prescription of ASA 81 mg bid for DVT prophylaxis for 4 weeks. Patient will follow-up with Dr. Kerr in 2 weeks for post-operative visit.      Pertinent Physical Exam At Time of Discharge  /63   Pulse 69   Temp 36.9 °C (98.4 °F) (Temporal)   Resp 16   Ht 1.803 m (5' 11\")   Wt 68.1 kg (150 lb 2.1 oz)   SpO2 95%   BMI 20.94 kg/m²      Gen: arousable, NAD, appropriately conversational  Cardiac: extremities warm  Resp: nonlabored on RA  GI: soft, nondistended     MSK:  Right Lower Extremity:   -Dressing c/d/i  -Fires DF/PF/EHL/FHL  -SILT in saph/sural/SPN/DPN distributions  -Foot warm, well perfused  -Palpable DP pulse, brisk cap refill  -Compartments soft and " [facility]    Home Medications     Medication List      START taking these medications     calcium carbonate-vitamin D3 500 mg-10 mcg (400 unit) tablet; Commonly   known as: Oscal-500; Take 1 tablet by mouth 2 times a day.   docusate sodium 100 mg capsule; Commonly known as: Colace; Take 1   capsule (100 mg) by mouth 2 times a day.   enoxaparin 40 mg/0.4 mL syringe; Commonly known as: Lovenox; Inject 0.4   mL (40 mg) under the skin once daily for 28 days.   oxyCODONE 5 mg immediate release tablet; Commonly known as: Roxicodone;   Take 1 tablet (5 mg) by mouth every 6 hours if needed for severe pain (7 -   10) for up to 28 doses.     CHANGE how you take these medications     * acetaminophen 325 mg tablet; Commonly known as: Tylenol; Take 2   tablets (650 mg) by mouth every 6 hours if needed for mild pain (1 - 3) or   moderate pain (4 - 6).; What changed: Another medication with the same   name was added. Make sure you understand how and when to take each.   * acetaminophen 325 mg tablet; Commonly known as: Tylenol; Take 2   tablets (650 mg) by mouth every 6 hours if needed for mild pain (1 - 3).;   What changed: You were already taking a medication with the same name, and   this prescription was added. Make sure you understand how and when to take   each.   amLODIPine 10 mg tablet; Commonly known as: Norvasc; 1/2 tablet or 5 mg   every day; What changed: additional instructions  * This list has 2 medication(s) that are the same as other medications   prescribed for you. Read the directions carefully, and ask your doctor or   other care provider to review them with you.     CONTINUE taking these medications     aspirin 81 mg chewable tablet   atorvastatin 40 mg tablet; Commonly known as: Lipitor; Take 1 tablet (40   mg) by mouth once daily.   carvedilol 3.125 mg tablet; Commonly known as: Coreg; TAKE 1 TABLET BY   MOUTH TWICE A DAY WITH FOOD   Centrum Silver Men 435--300 mcg tablet; Generic drug:    mv-min-folic-K1-lycopen-lutein   cholecalciferol 25 MCG (1000 UT) tablet; Commonly known as: Vitamin D-3   citalopram 20 mg tablet; Commonly known as: CeleXA; TAKE 1 TABLET BY   MOUTH EVERY DAY   losartan 50 mg tablet; Commonly known as: Cozaar; TAKE 1 TABLET BY MOUTH   EVERY DAY   solifenacin 5 mg tablet; Commonly known as: VESIcare; Take 1 tablet (5   mg) by mouth once daily. Swallow tablet whole; do not crush, chew, or   split.     STOP taking these medications     chlorhexidine 0.12 % solution; Commonly known as: Peridex   chlorhexidine 4 % external liquid; Commonly known as: Hibiclens   Paxlovid 300 mg (150 mg x 2)-100 mg tablet therapy pack; Generic drug:   nirmatrelvir-ritonavir     ASK your doctor about these medications     cyclobenzaprine 5 mg tablet; Commonly known as: Flexeril; Take 1 tablet   (5 mg) by mouth 3 times a day.   polyethylene glycol 17 gram packet; Commonly known as: Glycolax,   Miralax; Take 17 g by mouth once daily as needed (constipation).       Outpatient Follow-Up  Future Appointments   Date Time Provider Department Center   10/14/2024  9:30 AM Ivory Mederos PA-C [id number] [facility]   1/15/2025 10:00 AM MD [facility] Mcclendon   6/9/2025  8:20 AM Carroll Lai MD [id number] [facility]       Patric Lamb MD

## 2024-09-29 NOTE — PROGRESS NOTES
"Orthopaedic Surgery Progress Note    S:  No acute events overnight. Pain well controlled. Denies chest pain, shortness of breath, or fevers. pRBC infusion complete yesterday. Transport scheduled for today at 4pm.    O:  /63   Pulse 69   Temp 36.9 °C (98.4 °F) (Temporal)   Resp 16   Ht 1.803 m (5' 11\")   Wt 68.1 kg (150 lb 2.1 oz)   SpO2 95%   BMI 20.94 kg/m²     Gen: arousable, NAD, appropriately conversational  Cardiac: extremities warm  Resp: nonlabored on RA  GI: soft, nondistended    MSK:  Right Lower Extremity:   -Dressing c/d/i  -Fires DF/PF/EHL/FHL  -SILT in saph/sural/SPN/DPN distributions  -Foot warm, well perfused  -Palpable DP pulse, brisk cap refill  -Compartments soft and compressible  - leg lengths approximately equal    A/P: 69 y.o. male s/p R hip MELISSA/arthroplasty, ORIF R acetabulum on 9/25 with Dr. Kerr.      Plan:  - CBC with Hgb 7.9, ok for DC  - Weight bearing: WBAT RLE  - AP pelvis pending  - DVT ppx: SCDs, ASA 81 bid  - Diet: Regular  - Pain: Tylenol, oxycodone 5/10  - Antibiotics: perioperative ancef 2g q8hr x3 doses  - FEN: HLIV with good PO intake  - Bowel Regimen: Colace, senna, dulcolax  - PT/OT  - Pulm: Encourage IS  - Continue home medications  - No peñaloza    Dispo: accepted to SNF, pending repeat CBC in am     Jose Delgado MD  PGY-1 Orthopedic Surgery  [facility]  Epic Chat Preferred    While inpatient, this patient will be followed by the Orthopaedic Trauma team. Please see contact information below:    First call: Jose Delgado PGY-1  Second call: Jessee Feliciano PGY-2   Third call: Kristofer Swenson PGY-3    6pm-6am M-F, Holidays, and weekends page Ortho on-call @[phone] with urgent questions/concerns.          "

## 2024-09-30 ENCOUNTER — LAB REQUISITION (OUTPATIENT)
Dept: LAB | Facility: HOSPITAL | Age: 69
End: 2024-09-30
Payer: MEDICARE

## 2024-10-08 ENCOUNTER — DOCUMENTATION (OUTPATIENT)
Dept: HOME HEALTH SERVICES | Facility: HOME HEALTH | Age: 69
End: 2024-10-08
Payer: MEDICARE

## 2024-10-08 ENCOUNTER — HOME HEALTH ADMISSION (OUTPATIENT)
Dept: HOME HEALTH SERVICES | Facility: HOME HEALTH | Age: 69
End: 2024-10-08
Payer: MEDICARE

## 2024-10-08 ENCOUNTER — LAB REQUISITION (OUTPATIENT)
Dept: LAB | Facility: HOSPITAL | Age: 69
End: 2024-10-08
Payer: MEDICARE

## 2024-10-08 NOTE — HH CARE COORDINATION
Home Care received a Referral for Physical Therapy and Occupational Therapy. We have processed the referral for a Start of Care on 10/11.     If you have any questions or concerns, please feel free to contact us at [phone]. Follow the prompts, enter your five digit zip code, and you will be directed to your care team on [facility] 1.

## 2024-10-13 ENCOUNTER — HOME CARE VISIT (OUTPATIENT)
Dept: HOME HEALTH SERVICES | Facility: HOME HEALTH | Age: 69
End: 2024-10-13
Payer: MEDICARE

## 2024-10-13 VITALS
TEMPERATURE: 98.6 F | DIASTOLIC BLOOD PRESSURE: 60 MMHG | HEART RATE: 64 BPM | RESPIRATION RATE: 16 BRPM | SYSTOLIC BLOOD PRESSURE: 104 MMHG

## 2024-10-13 PROCEDURE — G0151 HHCP-SERV OF PT,EA 15 MIN: HCPCS | Mod: HHH

## 2024-10-13 PROCEDURE — 169592 NO-PAY CLAIM PROCEDURE

## 2024-10-13 ASSESSMENT — GAIT ASSESSMENTS
GAIT SCORE: 4
STEP CONTINUITY: 0 - STOPPING OR DISCONTINUITY BETWEEN STEPS
TRUNK SCORE: 0
BALANCE AND GAIT SCORE: 13
WALKING STANCE: 0 - HEELS APART
STEP SYMMETRY: 0 - RIGHT AND LEFT STEP LENGTH NOT EQUAL
PATH SCORE: 1
INITIATION OF GAIT IMMEDIATELY AFTER GO: 0 - ANY HESITANCY OR MULTIPLE ATTEMPTS TO START
PATH: 1 - MILD/MODERATE DEVIATION OR USES WALKING AID
TRUNK: 0 - MARKED SWAY OR USES WALKING AID

## 2024-10-13 ASSESSMENT — BALANCE ASSESSMENTS
ARISING SCORE: 1
EYES CLOSED AT MAXIMUM POSITION NUDGED: 1 - STEADY
BALANCE SCORE: 9
ARISES: 1 - ABLE, USES ARMS TO HELP
NUDGED: 2 - STEADY
NUDGED SCORE: 2
SITTING BALANCE: 1 - STEADY, SAFE
TURNING 360 DEGREES STEPS: 0 - DISCONTINUOUS STEPS
SITTING DOWN: 1 - USES ARMS OR NOT SMOOTH MOTION
STANDING BALANCE: 1 - STEADY BUT WIDE STANCE AND USES CANE OR OTHER SUPPORT
IMMEDIATE STANDING BALANCE FIRST 5 SECONDS: 1 - STEADY BUT USES WALKER OR OTHER SUPPORT
ATTEMPTS TO ARISE: 1 - ABLE, REQUIRES MORE THAN ONE ATTEMPT

## 2024-10-13 ASSESSMENT — ENCOUNTER SYMPTOMS
PAIN: 1
PERSON REPORTING PAIN: PATIENT
PAIN LOCATION: RIGHT HIP
PAIN SEVERITY GOAL: 0/10
LOWEST PAIN SEVERITY IN PAST 24 HOURS: 3/10
HIGHEST PAIN SEVERITY IN PAST 24 HOURS: 5/10
PAIN LOCATION - PAIN SEVERITY: 4/10
SUBJECTIVE PAIN PROGRESSION: UNCHANGED

## 2024-10-13 ASSESSMENT — ACTIVITIES OF DAILY LIVING (ADL)
AMBULATION ASSISTANCE ON FLAT SURFACES: 1
ENTERING_EXITING_HOME: STAND BY ASSIST
OASIS_M1830: 05

## 2024-10-13 NOTE — Clinical Note
PT start of care completed for home health today. Pt presents with R LE weakness and stiffness, balance deficits, and pain affecting his mobility. I will see him twice weekly for 4 weeks or until he is able to get into outpatient PT. OT to follow for ADL training.

## 2024-10-13 NOTE — HOME HEALTH
S: This pt was referred to home health PT following R SURI with anterior approach, pelvic nailing, and removal of luda from previous R femur fx with ORIF. He was in a rehab facility for 2 weeks. He is demonstrating increased difficulty with mobility as a result.    B: Pt lives with hab his wife in a multistory house with 3 stairs to exit the building. His bedroom is on the 2nd floor with 12 stairs to navigate. Pt is using a wheeled walker with SBA for all ambulation but was independent with ambulation without use of a wheeled walker to current episode of care. Pt reports no recent falls and rates current pain level at 4/10. Medications reconciled in the home and educated on the risks involved with use of aspirin.    A: Pt presents with R LE weakness affecting bed mobility, transfers, gait / stairs and balance, as well as gait and balance impairment as illustrated by Tinetti score of 13/28. Pt ambulates using a wheeled walker with SBA demonstrating decreased R hip and knee flexion during swing through phase, decreased R knee extension in stance phase, decreased stance phase time, decreased L stride length, instability and inconsistent rhythm. Pt instructed to focus on increasing R hip and knee flexion during swing through phase for now. Pt instructed in and performed stair navigation training of 12 stairs up and down using railing and wall ascending and railing and cane descending, displaying appropriate step to pattern. Pt instructed in importance of wearing compression stockings to reduce risk of DVT, as he is not wearing them today. Pt instructed in and performed seated ankle pumps, marching and LAQ, seated R hamstring stretch, and standing R calf stretch against the wall with good return demo and good understanding. Pt instructed in signs and symptoms of DVT and infection and to seek immediate medical attention should any arise. Pt has staples in 2 incisions near his R knee but is going to the surgeon's office  tomorrow to have them removed.     R: Pt will benefit from skilled PT for LE strengthening to facilitate bed mobility, transfers, gait / stairs and balance, as well as transfer, gait and balance training to improve functional mobility and independence. OT to follow for ADL training.

## 2024-10-14 ENCOUNTER — OFFICE VISIT (OUTPATIENT)
Dept: ORTHOPEDIC SURGERY | Facility: CLINIC | Age: 69
End: 2024-10-14
Payer: MEDICARE

## 2024-10-14 ENCOUNTER — HOME CARE VISIT (OUTPATIENT)
Dept: HOME HEALTH SERVICES | Facility: HOME HEALTH | Age: 69
End: 2024-10-14
Payer: MEDICARE

## 2024-10-14 ENCOUNTER — HOSPITAL ENCOUNTER (OUTPATIENT)
Dept: RADIOLOGY | Facility: CLINIC | Age: 69
Discharge: HOME | End: 2024-10-14
Payer: MEDICARE

## 2024-10-14 DIAGNOSIS — S32.461A CLOSED DISPLACED COMBINED TRANSVERSE-POSTERIOR FRACTURE OF RIGHT ACETABULUM, INITIAL ENCOUNTER (MULTI): ICD-10-CM

## 2024-10-14 DIAGNOSIS — Z96.641 S/P TOTAL RIGHT HIP ARTHROPLASTY: ICD-10-CM

## 2024-10-14 DIAGNOSIS — M16.51 POST-TRAUMATIC OSTEOARTHRITIS OF RIGHT HIP: ICD-10-CM

## 2024-10-14 DIAGNOSIS — M16.51 POST-TRAUMATIC OSTEOARTHRITIS OF RIGHT HIP: Primary | ICD-10-CM

## 2024-10-14 PROCEDURE — 1125F AMNT PAIN NOTED PAIN PRSNT: CPT

## 2024-10-14 PROCEDURE — 1159F MED LIST DOCD IN RCRD: CPT

## 2024-10-14 PROCEDURE — 1111F DSCHRG MED/CURRENT MED MERGE: CPT

## 2024-10-14 PROCEDURE — 73502 X-RAY EXAM HIP UNI 2-3 VIEWS: CPT | Mod: RT

## 2024-10-14 PROCEDURE — 73502 X-RAY EXAM HIP UNI 2-3 VIEWS: CPT | Mod: RIGHT SIDE | Performed by: RADIOLOGY

## 2024-10-14 PROCEDURE — 72170 X-RAY EXAM OF PELVIS: CPT

## 2024-10-14 PROCEDURE — 99211 OFF/OP EST MAY X REQ PHY/QHP: CPT

## 2024-10-14 PROCEDURE — G0152 HHCP-SERV OF OT,EA 15 MIN: HCPCS | Mod: HHH

## 2024-10-14 ASSESSMENT — ENCOUNTER SYMPTOMS
HIGHEST PAIN SEVERITY IN PAST 24 HOURS: 2/10
PERSON REPORTING PAIN: PATIENT
PAIN: 1
PAIN LOCATION: RIGHT HIP

## 2024-10-14 ASSESSMENT — ACTIVITIES OF DAILY LIVING (ADL)
TOILETING: 1
BATHING ASSESSED: 1
TOILETING: INDEPENDENT
PREPARING MEALS: DEPENDENT
BATHING_CURRENT_FUNCTION: MINIMUM ASSIST
DRESSING_LB_CURRENT_FUNCTION: MINIMUM ASSIST

## 2024-10-14 ASSESSMENT — PAIN SCALES - GENERAL: PAINLEVEL_OUTOF10: 3

## 2024-10-14 ASSESSMENT — PAIN - FUNCTIONAL ASSESSMENT: PAIN_FUNCTIONAL_ASSESSMENT: 0-10

## 2024-10-14 NOTE — PROGRESS NOTES
Date/Time: 10/14/24  0950hrs    Performed by: Radha Marquis LPN    Consent:     Consent obtained:  Verbal    Consent given by:  Patient    Risks, benefits, and alternatives were discussed: yes      Risks discussed:  Bleeding, pain and wound separation  Universal protocol:     Procedure explained and questions answered to patient or proxy's satisfaction: yes      Patient identity confirmed:  Verbally with patient  Procedure details:     Wound appearance:  healing well, no drainage, no erythema, no swelling, well approximated     Sutures or Staples: staples - lateral and anterior aspects of right knee.   Post-procedure details:     Post-removal:  Steri strips applied with mastisol    Procedure completion:  Tolerated well, no immediate complications    NIKHIL Bautista   [facility] [employer]  [address]  [place] [address]

## 2024-10-14 NOTE — PATIENT INSTRUCTIONS
POST STAPLE/SUTURE REMOVAL INSTRUCTIONS    Today you had your sutures (stitches) or staples removed in the office.     It is okay to shower starting tomorrow, if all stitches/sutures have been removed  DO NOT scrub the incision  Let water and soap run over the incision and pat dry after  Keep the adhesive strips on the wound for about 5 days.  Continue to keep your wound clean and dry     Your skin regains it's tensile strength slowly. At the time of suture/staple removal, your wound has only regained about 5%-10% of its strength. Therefore, protect the wound from injury during the next month or so. Injured tissue also requires additional protection from sun's damaging ultraviolet rays for the next several months.     Do not apply lotions, creams or vitamins oils on your lotion until the incision is healed AND you have received the okay from the providers**    Although your incision may APPEAR closed at this time, it is not fully healed yet and you are still at risk for infection. Please call the office if you have any of these signs and symptoms after sutures or staples have been removed:     -redness  -increasing pain  -swelling  -fever  -red streaks progressing away from the sutured site  -material (pus) coming from out of the wound  -bleeding  -foul smell   -or most importantly if the wound reopens,    Office Number: [phone]

## 2024-10-14 NOTE — PROGRESS NOTES
Subjective    Patient ID: Nasir Camacho is a 69 y.o. male.    Chief Complaint: Post-op Visit (S/p DA SURI and pinning of acetabulum )     Last Surgery: Arthroplasty Total Hip Anterior Approach - Right, Removal Hardware Pelvis - Right, and Pelvic Fracture Percutaneous Fixation - Right  Last Surgery Date: 9/25/2024    HPI  Patient is a 69 y.o. male who is s/p Right Direct Anterior Total hip Conversion with right pelvic percutaneous fixation. Date of surgery was 9/25/2024. Patient continues to be weight bearing as tolerated on the right leg with a walker at this time and denies issues with incision. Patient continues on lovenox for DVT ppx. Patient continues with home therapy sessions, performing exercise program at home. Patient denies fever or chills, N/T or calf pain. Patient states that his leg feels tight, but muscle relaxer causes him urinary frequency. Has discussed this with his PCP.    ROS: All other systems have been reviewed and are negative except as previously noted in history of present illness.      IMP:  Problem List Items Addressed This Visit       Closed displaced associated transverse-posterior fracture of right acetabulum (Multi)    Relevant Orders    Referral to Physical Therapy    Post-traumatic osteoarthritis of right hip - Primary    Relevant Orders    XR hip right with pelvis when performed 2 or 3 views    Referral to Physical Therapy     Other Visit Diagnoses       S/P total right hip arthroplasty        Relevant Orders    XR hip right with pelvis when performed 2 or 3 views    Referral to Physical Therapy          Objective   General: Alert and oriented x 3, NAD, respirations easy and unlabored with no audible wheezes, skin warm and dry, speech and dress appropriate for noted age, affect euthymic.     Musculoskeletal: Right Lower Extremity  incisions c/d/i  mild swelling to lower leg  compartments soft  no calf tenderness  sensation intact to light touch  motor intact including  TA/GS/EHL  palpable DP/PT pulses 2+     X-ray: Images of pelvis reviewed personally by me today and reveal maintenance of alignment of acetabulum fracture with hardware in position and no interval change. Images of right hip reviewed personally by me today and reveal maintenance of alignment of prosthesis with hardware in position and no interval change. No loosening noted. No lucency. Stable appearance.       Assessment/Plan   Encounter Diagnoses:  Post-traumatic osteoarthritis of right hip    Closed displaced combined transverse-posterior fracture of right acetabulum, initial encounter (Multi)    S/P total right hip arthroplasty    PLAN: Patient is s/p Right Direct Anterior Total hip Conversion with right pelvic percutaneous fixation. Staples were removed at this visit. Patient is overall doing well. He is weight bearing as tolerated with a walker at this time.   Currently working with home physical therapy. Patient states that his leg feels tight, but muscle relaxer causes him urinary frequency. Has discussed this with his PCP. Imaging reveals a stable right acetabulum fracture with stable hardware and a stable right hip prosthesis. Patient is educated that it is not uncommon to still feel tight on his right leg due to how long his hip was elevated into his hip socket due to the acetabulum fracture. This should get better with time. Deferred restarting his muscle relaxer to his PCP due to the increase in urinary frequency when taken. Told the patient to discuss and see if extra medication to control urinary frequency is needed. Patient will continue to work with home physical therapy on hip and knee ROM, quad strengthening, gait training, and weight bearing as tolerated. Patient will follow up in 3 months. Xrays of the pelvis(judets) and right hip.     Orders Placed This Encounter    XR hip right with pelvis when performed 2 or 3 views    Referral to Physical Therapy     No follow-ups on file.

## 2024-10-16 ENCOUNTER — HOME CARE VISIT (OUTPATIENT)
Dept: HOME HEALTH SERVICES | Facility: HOME HEALTH | Age: 69
End: 2024-10-16
Payer: MEDICARE

## 2024-10-16 VITALS
TEMPERATURE: 98.6 F | RESPIRATION RATE: 16 BRPM | SYSTOLIC BLOOD PRESSURE: 110 MMHG | DIASTOLIC BLOOD PRESSURE: 52 MMHG | HEART RATE: 62 BPM

## 2024-10-16 PROCEDURE — G0151 HHCP-SERV OF PT,EA 15 MIN: HCPCS | Mod: HHH

## 2024-10-16 NOTE — HOME HEALTH
Homebound status: Patient is homebound due to difficulty navigating stairs to leave the home    S: PT follow up visit. Pt being seen for decreased functional mobility related to R SURI.    B: Pt reports he is feeling a little down today due to having to endure his current functional limitations for so long. He reports no falls, no change in medications, and rates current pain level at 3/10. He has been walking some, but not a lot, and doing some of the seated exercises.    A: Pt performed seated R LE PREs, standing R LE AROM, including marching with focus on placing heel on the floor and extending R knee during stance phase of exercise. Performed gait training of 12 stairs up and down using railing and wall with SBA - CGA, and min assist for support for the last few stairs ascending and descending as pt was getting fatigued and did not feel he would be able to continue without assistance. Pt instructed to walk more frequently and perform all HEP more regularly, including hamstring stretch in sitting and quad sets to reduce R hamstring spasms. Pt requires about 5 - 10 seconds for R knee to straighten once he stands up from sitting due to hamstring spasms.     R: Pt demonstrating improved gait and requires continued PT for further strengthening, gait and balance training to improve functional mobility and independence.    Plan for next visit: Progress gait training and standing exercises.

## 2024-10-19 ENCOUNTER — HOME CARE VISIT (OUTPATIENT)
Dept: HOME HEALTH SERVICES | Facility: HOME HEALTH | Age: 69
End: 2024-10-19
Payer: MEDICARE

## 2024-10-21 ENCOUNTER — HOME CARE VISIT (OUTPATIENT)
Dept: HOME HEALTH SERVICES | Facility: HOME HEALTH | Age: 69
End: 2024-10-21
Payer: MEDICARE

## 2024-10-21 VITALS
RESPIRATION RATE: 16 BRPM | HEART RATE: 58 BPM | SYSTOLIC BLOOD PRESSURE: 120 MMHG | DIASTOLIC BLOOD PRESSURE: 50 MMHG | TEMPERATURE: 98.1 F

## 2024-10-21 PROCEDURE — G0151 HHCP-SERV OF PT,EA 15 MIN: HCPCS | Mod: HHH

## 2024-10-22 NOTE — HOME HEALTH
Homebound status: Patient is homebound due to difficulty navigating stairs to leave the home    S: PT follow up visit. Pt being seen for decreased functional mobility related to R SURI with pelvic screw and removal of luda from femur.    B: Pt reports he has been trying to walk more as his wife told him he was using the wheelchair too much. He has been doing his exercises and is getting anxious to get back to being able to walk normally. He reports no falls, no change in medications, and rates current pain level at 2-3/10.    A: Pt performed seated and standing R LE AROM exercises. Instructed in and performed gait training using wheeled walker with SBA down driveway and sidewalk 2 houses down and back, and stair navigation training down and up 3 stairs to exit the front of the house and back, requiring verbal cues to increase R knee extension during stance phase and to land on his heel.     R: Pt demonstrating improved gait and requires continued PT for further strengthening, gait and balance training to improve functional mobility and independence.    Plan for next visit: Progress R LE weight bearing exercises

## 2024-10-23 ENCOUNTER — HOME CARE VISIT (OUTPATIENT)
Dept: HOME HEALTH SERVICES | Facility: HOME HEALTH | Age: 69
End: 2024-10-23
Payer: MEDICARE

## 2024-10-23 VITALS
SYSTOLIC BLOOD PRESSURE: 138 MMHG | DIASTOLIC BLOOD PRESSURE: 60 MMHG | RESPIRATION RATE: 16 BRPM | HEART RATE: 66 BPM | TEMPERATURE: 98.6 F

## 2024-10-23 PROCEDURE — G0151 HHCP-SERV OF PT,EA 15 MIN: HCPCS | Mod: HHH

## 2024-10-24 ENCOUNTER — HOME CARE VISIT (OUTPATIENT)
Dept: HOME HEALTH SERVICES | Facility: HOME HEALTH | Age: 69
End: 2024-10-24
Payer: MEDICARE

## 2024-10-24 PROCEDURE — G0152 HHCP-SERV OF OT,EA 15 MIN: HCPCS | Mod: HHH

## 2024-10-24 ASSESSMENT — ACTIVITIES OF DAILY LIVING (ADL)
TOILETING: 1
TOILETING: INDEPENDENT

## 2024-10-24 NOTE — HOME HEALTH
Homebound status: Patient is homebound due to difficulty navigating stairs to leave the home    S: PT follow up visit. Pt being seen for decreased functional mobility related to R SURI, pelvic ORIF, R femur luda removal.    B: Pt reports he is feeling better today and has been walking more throughout the day, and stretching his R LE often. He reports no falls, no change in medications, and rates current pain level at 2-3/10.    A: Pt performed gait / stair training down 3 stairs to exit front of house with SBA and walker management, down driveway, and down sidewalk to corner of street, approximately 1000 ft, with SBA and demonstrating improved R knee extension during stance phase, then back to and into house. Pt fatigued by end of gait training and required manual assistance to stabilize R knee during stance phase while lifting L LE up to next step while ascending to re-enter the home. Pt performed standing B hip abduction and extension to focus on increased weight bearing through R LE, but able to complete only 5 repetitions each due to fatigue.    R: Pt demonstrating improved gait and requires continued PT for further strengthening, gait and balance training to improve functional mobility and independence.    Plan for next visit: Progress R LE weight bearing

## 2024-10-28 ENCOUNTER — HOME CARE VISIT (OUTPATIENT)
Dept: HOME HEALTH SERVICES | Facility: HOME HEALTH | Age: 69
End: 2024-10-28
Payer: MEDICARE

## 2024-10-28 VITALS
SYSTOLIC BLOOD PRESSURE: 128 MMHG | TEMPERATURE: 98.2 F | DIASTOLIC BLOOD PRESSURE: 60 MMHG | RESPIRATION RATE: 16 BRPM | HEART RATE: 69 BPM

## 2024-10-28 PROCEDURE — G0151 HHCP-SERV OF PT,EA 15 MIN: HCPCS | Mod: HHH

## 2024-10-31 ENCOUNTER — HOME CARE VISIT (OUTPATIENT)
Dept: HOME HEALTH SERVICES | Facility: HOME HEALTH | Age: 69
End: 2024-10-31
Payer: MEDICARE

## 2024-10-31 VITALS
HEART RATE: 62 BPM | RESPIRATION RATE: 16 BRPM | SYSTOLIC BLOOD PRESSURE: 124 MMHG | DIASTOLIC BLOOD PRESSURE: 60 MMHG | TEMPERATURE: 98.3 F

## 2024-10-31 PROCEDURE — G0151 HHCP-SERV OF PT,EA 15 MIN: HCPCS | Mod: HHH

## 2024-11-01 ENCOUNTER — APPOINTMENT (OUTPATIENT)
Dept: PRIMARY CARE | Facility: CLINIC | Age: 69
End: 2024-11-01
Payer: MEDICARE

## 2024-11-04 ENCOUNTER — HOME CARE VISIT (OUTPATIENT)
Dept: HOME HEALTH SERVICES | Facility: HOME HEALTH | Age: 69
End: 2024-11-04
Payer: MEDICARE

## 2024-11-04 VITALS
TEMPERATURE: 98.2 F | RESPIRATION RATE: 16 BRPM | DIASTOLIC BLOOD PRESSURE: 60 MMHG | HEART RATE: 62 BPM | SYSTOLIC BLOOD PRESSURE: 132 MMHG

## 2024-11-04 PROCEDURE — G0151 HHCP-SERV OF PT,EA 15 MIN: HCPCS | Mod: HHH

## 2024-11-04 NOTE — HOME HEALTH
Homebound status: Pt is homebound due to difficulty ambulating distance from his condo to the parking lot of the building.    S: PT follow up visit. Pt being seen for decreased functional mobility related to R SURI with anterior approach.    B: Pt reports he is doing well today. He is moved into his new condo and is tired from all of the activities involved with that. He reports no falls, no change in medications, and rates current pain level at 1-2/10. Pt is scheduled to start outpatient PT next week.    A: Pt performed seated and standing R LE PREs, and gait training using cane with supervision assist and verbal cues to increase R hip and knee flexion during swing through phase to reduce R foot drag. Pt demonstrated fair stability and consistency of rhythm during gait training with cane. Instructed to practice with cane 2-3 times daily with someone nearby, but otherwise continue to use wheeled walker for most ambulation for now. Discussed upcoming discharge. Pt signed [facility]OM[facility].    R: Pt demonstrating improved gait and requires continued PT for further strengthening, gait and balance training to improve functional mobility and independence.    Plan for next visit: Reassess for discharge

## 2024-11-05 ENCOUNTER — HOME CARE VISIT (OUTPATIENT)
Dept: HOME HEALTH SERVICES | Facility: HOME HEALTH | Age: 69
End: 2024-11-05
Payer: MEDICARE

## 2024-11-05 PROCEDURE — G0152 HHCP-SERV OF OT,EA 15 MIN: HCPCS | Mod: HHH

## 2024-11-05 ASSESSMENT — ACTIVITIES OF DAILY LIVING (ADL)
DRESSING_LB_CURRENT_FUNCTION: SUPERVISION
BATHING ASSESSED: 1
BATHING_CURRENT_FUNCTION: SUPERVISION
DRESSING_UB_CURRENT_FUNCTION: SUPERVISION

## 2024-11-06 ENCOUNTER — HOME CARE VISIT (OUTPATIENT)
Dept: HOME HEALTH SERVICES | Facility: HOME HEALTH | Age: 69
End: 2024-11-06
Payer: MEDICARE

## 2024-11-06 VITALS
TEMPERATURE: 98.4 F | SYSTOLIC BLOOD PRESSURE: 110 MMHG | DIASTOLIC BLOOD PRESSURE: 54 MMHG | HEART RATE: 66 BPM | RESPIRATION RATE: 16 BRPM

## 2024-11-06 PROCEDURE — G0151 HHCP-SERV OF PT,EA 15 MIN: HCPCS | Mod: HHH

## 2024-11-06 ASSESSMENT — GAIT ASSESSMENTS
TRUNK SCORE: 0
WALKING STANCE: 1 - HEELS ALMOST TOUCHING WHILE WALKING
TRUNK: 0 - MARKED SWAY OR USES WALKING AID
PATH: 1 - MILD/MODERATE DEVIATION OR USES WALKING AID
STEP SYMMETRY: 1 - RIGHT AND LEFT STEP LENGTH APPEAR EQUAL
STEP CONTINUITY: 1 - STEPS APPEAR CONTINUOUS
BALANCE AND GAIT SCORE: 19
PATH SCORE: 1
INITIATION OF GAIT IMMEDIATELY AFTER GO: 0 - ANY HESITANCY OR MULTIPLE ATTEMPTS TO START
GAIT SCORE: 8

## 2024-11-06 ASSESSMENT — BALANCE ASSESSMENTS
IMMEDIATE STANDING BALANCE FIRST 5 SECONDS: 1 - STEADY BUT USES WALKER OR OTHER SUPPORT
ARISES: 1 - ABLE, USES ARMS TO HELP
NUDGED SCORE: 2
ATTEMPTS TO ARISE: 2 - ABLE TO RISE, ONE ATTEMPT
NUDGED: 2 - STEADY
STANDING BALANCE: 1 - STEADY BUT WIDE STANCE AND USES CANE OR OTHER SUPPORT
ARISING SCORE: 1
SITTING BALANCE: 1 - STEADY, SAFE
SITTING DOWN: 1 - USES ARMS OR NOT SMOOTH MOTION
TURNING 360 DEGREES STEPS: 0 - DISCONTINUOUS STEPS
EYES CLOSED AT MAXIMUM POSITION NUDGED: 1 - STEADY
BALANCE SCORE: 11

## 2024-11-06 ASSESSMENT — ENCOUNTER SYMPTOMS
HIGHEST PAIN SEVERITY IN PAST 24 HOURS: 3/10
SUBJECTIVE PAIN PROGRESSION: GRADUALLY IMPROVING
PAIN LOCATION: RIGHT HIP
PAIN LOCATION - PAIN SEVERITY: 2/10
PAIN: 1
PAIN SEVERITY GOAL: 0/10
PERSON REPORTING PAIN: PATIENT
LOWEST PAIN SEVERITY IN PAST 24 HOURS: 0/10

## 2024-11-06 ASSESSMENT — ACTIVITIES OF DAILY LIVING (ADL)
HOME_HEALTH_OASIS: 01
OASIS_M1830: 01
AMBULATION ASSISTANCE ON FLAT SURFACES: 1

## 2024-11-06 NOTE — HOME HEALTH
S: PT reassessment for discharge. Pt being seen for decreased mobility related to R SURI.    B: Pt reports he is doing well and has been practicing with the cane around his apartment some. He reports no falls, no change in medications, and rates current pain level at 2/10. Pt has pictoral handouts for HEP and is performing daily. He is scheduled to start outpatient PT next week.    A: Pt demonstrates good improvement in functional mobility as illustrated by improved gait technique, independence and endurance, and improved Tinetti score from 13/28 on SOC to 19/28 today. Pt ambulates safely and independently using wheeled walker demonstrating improved technique with increased and even strides, improved R knee stance phase time, improved R knee extension in stance phase, improved R hip and knee flexion during swing through phase, improved stability and consistency of rhythm. Pt ambulates using cane demonstrating improved technique but requires supervision assist for safety and infrequent verbal cues to increase R hip and knee flexion. Pt is comfortable and independent with HEP and appropriate for discharge from home health PT today and transition to outpatient PT next week.    P: Pt has met goals and is in agreement with discharge from home health PT today.

## 2024-11-06 NOTE — Clinical Note
Pt discharged from home health PT today due to goals met. He is doing much better with his mobility and is scheduled to start outpatient PT next week.

## 2024-11-07 ENCOUNTER — HOSPITAL ENCOUNTER (EMERGENCY)
Facility: HOSPITAL | Age: 69
Discharge: HOME | End: 2024-11-07
Attending: EMERGENCY MEDICINE
Payer: MEDICARE

## 2024-11-07 ENCOUNTER — APPOINTMENT (OUTPATIENT)
Dept: RADIOLOGY | Facility: HOSPITAL | Age: 69
End: 2024-11-07
Payer: MEDICARE

## 2024-11-07 VITALS
BODY MASS INDEX: 21.7 KG/M2 | RESPIRATION RATE: 16 BRPM | WEIGHT: 155 LBS | OXYGEN SATURATION: 97 % | HEIGHT: 71 IN | SYSTOLIC BLOOD PRESSURE: 133 MMHG | TEMPERATURE: 97.3 F | HEART RATE: 74 BPM | DIASTOLIC BLOOD PRESSURE: 50 MMHG

## 2024-11-07 DIAGNOSIS — N20.1 CALCULUS OF DISTAL RIGHT URETER: Primary | ICD-10-CM

## 2024-11-07 LAB
ALBUMIN SERPL BCP-MCNC: 4.2 G/DL (ref 3.4–5)
ALP SERPL-CCNC: 86 U/L (ref 33–136)
ALT SERPL W P-5'-P-CCNC: 16 U/L (ref 10–52)
ANION GAP SERPL CALC-SCNC: 11 MMOL/L (ref 10–20)
APPEARANCE UR: CLEAR
AST SERPL W P-5'-P-CCNC: 16 U/L (ref 9–39)
BASOPHILS # BLD AUTO: 0.03 X10*3/UL (ref 0–0.1)
BASOPHILS NFR BLD AUTO: 0.3 %
BILIRUB SERPL-MCNC: 1.1 MG/DL (ref 0–1.2)
BILIRUB UR STRIP.AUTO-MCNC: NEGATIVE MG/DL
BUN SERPL-MCNC: 23 MG/DL (ref 6–23)
CALCIUM SERPL-MCNC: 9.6 MG/DL (ref 8.6–10.3)
CHLORIDE SERPL-SCNC: 104 MMOL/L (ref 98–107)
CO2 SERPL-SCNC: 28 MMOL/L (ref 21–32)
COLOR UR: YELLOW
CREAT SERPL-MCNC: 0.95 MG/DL (ref 0.5–1.3)
EGFRCR SERPLBLD CKD-EPI 2021: 86 ML/MIN/1.73M*2
EOSINOPHIL # BLD AUTO: 0.25 X10*3/UL (ref 0–0.4)
EOSINOPHIL NFR BLD AUTO: 2.5 %
ERYTHROCYTE [DISTWIDTH] IN BLOOD BY AUTOMATED COUNT: 14.1 % (ref 11.5–14.5)
GLUCOSE SERPL-MCNC: 115 MG/DL (ref 74–99)
GLUCOSE UR STRIP.AUTO-MCNC: NORMAL MG/DL
HCT VFR BLD AUTO: 34.1 % (ref 41–52)
HGB BLD-MCNC: 11 G/DL (ref 13.5–17.5)
IMM GRANULOCYTES # BLD AUTO: 0.02 X10*3/UL (ref 0–0.5)
IMM GRANULOCYTES NFR BLD AUTO: 0.2 % (ref 0–0.9)
KETONES UR STRIP.AUTO-MCNC: NEGATIVE MG/DL
LEUKOCYTE ESTERASE UR QL STRIP.AUTO: NEGATIVE
LYMPHOCYTES # BLD AUTO: 1.48 X10*3/UL (ref 0.8–3)
LYMPHOCYTES NFR BLD AUTO: 15 %
MAGNESIUM SERPL-MCNC: 2.03 MG/DL (ref 1.6–2.4)
MCH RBC QN AUTO: 32.1 PG (ref 26–34)
MCHC RBC AUTO-ENTMCNC: 32.3 G/DL (ref 32–36)
MCV RBC AUTO: 99 FL (ref 80–100)
MONOCYTES # BLD AUTO: 0.63 X10*3/UL (ref 0.05–0.8)
MONOCYTES NFR BLD AUTO: 6.4 %
MUCOUS THREADS #/AREA URNS AUTO: ABNORMAL /LPF
NEUTROPHILS # BLD AUTO: 7.46 X10*3/UL (ref 1.6–5.5)
NEUTROPHILS NFR BLD AUTO: 75.6 %
NITRITE UR QL STRIP.AUTO: NEGATIVE
NRBC BLD-RTO: 0 /100 WBCS (ref 0–0)
PH UR STRIP.AUTO: 5.5 [PH]
PLATELET # BLD AUTO: 224 X10*3/UL (ref 150–450)
POTASSIUM SERPL-SCNC: 4.2 MMOL/L (ref 3.5–5.3)
PROT SERPL-MCNC: 6.5 G/DL (ref 6.4–8.2)
PROT UR STRIP.AUTO-MCNC: NEGATIVE MG/DL
RBC # BLD AUTO: 3.43 X10*6/UL (ref 4.5–5.9)
RBC # UR STRIP.AUTO: ABNORMAL /UL
RBC #/AREA URNS AUTO: >20 /HPF
SODIUM SERPL-SCNC: 139 MMOL/L (ref 136–145)
SP GR UR STRIP.AUTO: 1.02
UROBILINOGEN UR STRIP.AUTO-MCNC: NORMAL MG/DL
WBC # BLD AUTO: 9.9 X10*3/UL (ref 4.4–11.3)
WBC #/AREA URNS AUTO: ABNORMAL /HPF

## 2024-11-07 PROCEDURE — 99284 EMERGENCY DEPT VISIT MOD MDM: CPT | Mod: 25

## 2024-11-07 PROCEDURE — 96375 TX/PRO/DX INJ NEW DRUG ADDON: CPT

## 2024-11-07 PROCEDURE — 85025 COMPLETE CBC W/AUTO DIFF WBC: CPT | Performed by: EMERGENCY MEDICINE

## 2024-11-07 PROCEDURE — 2500000004 HC RX 250 GENERAL PHARMACY W/ HCPCS (ALT 636 FOR OP/ED): Performed by: EMERGENCY MEDICINE

## 2024-11-07 PROCEDURE — 36415 COLL VENOUS BLD VENIPUNCTURE: CPT | Performed by: EMERGENCY MEDICINE

## 2024-11-07 PROCEDURE — 2550000001 HC RX 255 CONTRASTS: Performed by: EMERGENCY MEDICINE

## 2024-11-07 PROCEDURE — 74177 CT ABD & PELVIS W/CONTRAST: CPT

## 2024-11-07 PROCEDURE — 80053 COMPREHEN METABOLIC PANEL: CPT | Performed by: EMERGENCY MEDICINE

## 2024-11-07 PROCEDURE — 81001 URINALYSIS AUTO W/SCOPE: CPT | Performed by: EMERGENCY MEDICINE

## 2024-11-07 PROCEDURE — 83735 ASSAY OF MAGNESIUM: CPT | Performed by: EMERGENCY MEDICINE

## 2024-11-07 PROCEDURE — 74177 CT ABD & PELVIS W/CONTRAST: CPT | Performed by: RADIOLOGY

## 2024-11-07 PROCEDURE — 96374 THER/PROPH/DIAG INJ IV PUSH: CPT | Mod: 59

## 2024-11-07 RX ORDER — OXYCODONE AND ACETAMINOPHEN 5; 325 MG/1; MG/1
1 TABLET ORAL EVERY 6 HOURS PRN
Qty: 5 TABLET | Refills: 0 | Status: SHIPPED | OUTPATIENT
Start: 2024-11-07 | End: 2024-11-10

## 2024-11-07 RX ORDER — TAMSULOSIN HYDROCHLORIDE 0.4 MG/1
0.4 CAPSULE ORAL DAILY
Qty: 15 CAPSULE | Refills: 0 | Status: SHIPPED | OUTPATIENT
Start: 2024-11-07

## 2024-11-07 RX ORDER — ONDANSETRON HYDROCHLORIDE 2 MG/ML
4 INJECTION, SOLUTION INTRAVENOUS ONCE
Status: COMPLETED | OUTPATIENT
Start: 2024-11-07 | End: 2024-11-07

## 2024-11-07 ASSESSMENT — PAIN SCALES - GENERAL
PAINLEVEL_OUTOF10: 4
PAINLEVEL_OUTOF10: 4

## 2024-11-07 ASSESSMENT — PAIN DESCRIPTION - DESCRIPTORS: DESCRIPTORS: DISCOMFORT

## 2024-11-07 ASSESSMENT — PAIN DESCRIPTION - LOCATION
LOCATION: ABDOMEN
LOCATION: ABDOMEN

## 2024-11-07 ASSESSMENT — PAIN DESCRIPTION - PAIN TYPE: TYPE: ACUTE PAIN

## 2024-11-07 ASSESSMENT — PAIN - FUNCTIONAL ASSESSMENT: PAIN_FUNCTIONAL_ASSESSMENT: 0-10

## 2024-11-07 ASSESSMENT — PAIN DESCRIPTION - ORIENTATION
ORIENTATION: RIGHT;LOWER
ORIENTATION: RIGHT;LOWER

## 2024-11-07 NOTE — ED TRIAGE NOTES
Pt BIBPV c/o right lower quadrant abd pain. Pt stated that pain woke him up in the middle of night. Pt had a bowel movement but pain didn't go away. Pt denies n/v, sob, cp and diarrhea.

## 2024-11-07 NOTE — ED PROVIDER NOTES
Emergency Department Provider Note             History of Present Illness   CC: Abdominal Pain    History provided by: Patient and Family Member  Limitations to History: None    HPI:  Nasir Camacho is a 69 y.o. male with history including aortic stenosis status post TAVR, hypertension, hyperlipidemia, CAD with prior PCI, CVA with right hemiparesis, hyperthyroidism, prior right hip surgery presenting for right lower quadrant pain that woke him up from sleep overnight.  States he had a bowel movement which did not influence his pain.  He denies fever, chills, nausea, vomiting, diarrhea, constipation, GI bleeding, hematuria, dysuria, difficulty urinating or increased frequency, testicular pain or swelling, recent trauma.    ---  Past Medical History:   Diagnosis Date    Aortic stenosis     s/p TAVR 6/29/21    BRCA positive     Chronic pain disorder     Coronary artery disease     COVID-19 09/23/2022    Displaced fracture of right acetabulum (Multi)     Graves disease     Hip fracture (Multi)     Hyperlipidemia     Hypertension     Hyperthyroidism     Myocardial infarction (Multi) 1999    acute plaque rupture in the LAD    Pelvic fracture (Multi)     Pulmonary nodule     stable    Stroke (Multi) 05/2016    hemorrhagic stroke with right hemiparesis in 2016     Past Surgical History:   Procedure Laterality Date    ANKLE SURGERY      CARDIAC CATHETERIZATION      CARDIAC VALVE REPLACEMENT  06/29/2021    transcatheter aortic valve replacement    CORONARY ANGIOPLASTY WITH STENT PLACEMENT  10/06/2014    Cath Stent Placement    CT ANGIO NECK  07/18/2021    CT NECK ANGIO W AND WO IV CONTRAST 7/18/2021 [facility] CL[facility]ICAL LEGAC[facility]    CT HEAD ANGIO W AND WO IV CONTRAST  07/18/2021    CT HEAD ANGIO W AND WO IV CONTRAST 7/18/2021 [facility] CLINICAL LEGACY    FEMUR FRACTURE SURGERY  11/06/2014    Femur Repair       Allergies   Allergen Reactions    Penicillin Unknown       Physical Exam   Triage vitals:  T 36.3 °C (97.3 °F)  HR 65  BP (!)  119/49  RR 17  O2 98 % None (Room air)    General: awake, well-appearing, no distress  Head: normocephalic, atraumatic  Eyes: pupils equal, extraocular movements grossly intact, no conjunctival injection or scleral icterus  ENT: nares patent, moist mucous membranes  Neck: supple, trachea midline, no masses  CV: regular rate and rhythm, well-perfused, 2+ peripheral pulses which are symmetric  Resp: breathing is non-labored, speaking in full sentences. Lungs are clear to auscultation bilaterally  GI: soft, non-distended, right lower quadrant tenderness, no rebound or guarding  : descended testicles without swelling, tenderness, erythema; vertical lie.  No penile lesions or overlying skin changes to genitalia.   Extremities: no edema, no gross deformity   Back: No CVA or spinal tenderness  Neuro: alert, oriented, speech is fluent, face is symmetric, moving all extremities   Psych: Appropriate mood and affect    ED Course & Medical Decision Making     69 y.o. male with history including aortic stenosis status post TAVR, hypertension, hyperlipidemia, CAD with prior PCI, CVA with right hemiparesis, hyperthyroidism, prior right hip surgery presenting for right lower quadrant pain that woke him up from sleep overnight.  He is nontoxic-appearing slightly uncomfortable from the pain.  His right lower quadrant tenderness without signs of peritonitis.   exam is unremarkable.  No CVA tenderness.  He was given symptomatic management.  Workup initiated.  At this time, I most concerned about appendicitis, kidney stone, urinary tract infection, diverticulitis, or colitis.  I have lower suspicion for testicular torsion acute scrotal process to warrant ultrasound at this time.  Also lower suspicion for an acute vascular based on my clinical assessment.  He was signed out to the oncoming ED attending pending results and reevaluation.    External Records Reviewed: Recent discharge summary    Social Determinants Limiting Care: None  identified    EKG: See ED course.     Results: Independently reviewed and interpreted by me. Please see ED course and MDM for my full interpretation.     Chronic Medical Conditions Significantly Affecting Care: As documented above in MDM    Patient was discussed with the following consultants/services: None     Care Considerations: As documented above in [facility]    ED Course:  ED Course as of 11/07/24 1854   Thu Nov 07, 2024   0645 EKG per my interpretation with sinus bradycardia, rate 56, normal axis, normal intervals, no significant ST elevation/depression or T wave abnormalities [LM]      ED Course User Index  [LM] Magda Marie MD         Diagnoses as of 11/07/24 1854   Calculus of distal right ureter     Disposition   Handoff to oncoming attending    MD Magda Forbes MD  11/07/24 1854

## 2024-11-07 NOTE — PROGRESS NOTES
Emergency Medicine Transition of Care Note.    I received Nasir Camacho in signout from Dr. Marie.  Please see the previous ED provider note for all HPI, PE and MDM up to the time of signout at 6 AM. This is in addition to the primary record.    In brief Nasir Camacho is an 69 y.o. male presenting for   Chief Complaint   Patient presents with    Abdominal Pain     At the time of signout we were awaiting: Abdominal CAT scan results    ED Course as of 11/07/24 2248   Thu Nov 07, 2024   0645 EKG per my interpretation with sinus bradycardia, rate 56, normal axis, normal intervals, no significant ST elevation/depression or T wave abnormalities [LM]      ED Course User Index  [LM] Magda Marie MD         Diagnoses as of 11/07/24 2248   Calculus of distal right ureter       Medical Decision Making  Abdominal CAT scan shows a kidney stones, patient currently comfortable pain-free UA negative no infection has blood GFR normal no evidence of systemic infection septic shock sepsis or obstructive uropathy patient is well versed with kidney stones has history of kidney stones all his life.  Will be discharged home with supportive care adequate analgesia close outpatient follow-up with urology with return precaution.    Abdominal CT scan shows a 4 mm stone in the urinary bladder and a smaller stone 3 mm in the right mid ureter.  On my evaluation patient is feeling remarkably better pain down to 0 abdomen soft nontender no rebound or guarding good bowel sounds.  No CVA tenderness.  Labs reviewed UA shows blood no infection.  Normal GFR.  Patient made aware of the abnormal findings and his history no kidney stones will be discharged home with Flomax and Percocet urgent outpatient follow-up with urology with strict return precaution.    Final diagnoses:   None           Procedure  Procedures    Austyn Rodríguez MD

## 2024-11-11 NOTE — PROGRESS NOTES
Physical Therapy  Physical Therapy Orthopedic Evaluation    Patient Name: Nasir Camacho  MRN: [id number]  Today's Date: 11/12/2024  Time Calculation  Start Time: 1100  Stop Time: 1140  Time Calculation (min): 40 min    Insurance:  [place]: [place] of [place]  Authorization info: no auth needed   Insurance Type: Medicare  Cert date start: 11/12/24        Cert date end: 2/10/25                General:  Reason for visit: Right hip pain s/p SURI (anterior approach) and pinning of acetabulum 9/25/24  Referred by: Ivory Mederos PA-C    Current Problem  1. Acute right hip pain  Follow Up In Physical Therapy      2. Post-traumatic osteoarthritis of right hip  Referral to Physical Therapy      3. Closed displaced combined transverse-posterior fracture of right acetabulum, initial encounter (Multi)  Referral to Physical Therapy      4. S/P total right hip arthroplasty  Referral to Physical Therapy          Precautions  STEADI Fall Risk Score (The score of 4 or more indicates an increased risk of falling): 8  Precautions Comment: Anterior SURI, h/o stroke, high BP    Medical History Form: Reviewed (scanned into chart)    Subjective:     Chief Complaint: Patient presents to clinic with right hip pain s/p SURI and pinning of acetabulum. Transitioning from home PT. Uses wheeled walker.  Onset Date: 9/25/24    Current Condition:   Better    Pain:  Pain Assessment: 0-10  0-10 (Numeric) Pain Score: 2  Location: Right proximal thigh  Description: achy  Aggravating Factors: getting dressed, standing, sitting  Relieving Factors:  Tylenol PRN    Relevant Information (PMH & Previous Tests/Imaging): previous right femur fracture, ankle fracture  Previous Interventions/Treatments: None    Prior Level of Function (PLOF)  Patient previously independent with all ADLs  Exercise/Physical Activity: was going to the gym to use machines   Work/School: - psychotherapist     Patients Living Environment: Reviewed and no concern  Stairs:  none    Primary Language: English    Patient's Goal(s) for Therapy: Would like to get rid of walker and ambulate on his own.    Red Flags: Do you have any of the following? No  Fever/chills, unexplained weight changes, dizziness/fainting, unexplained change in bowel or bladder functions, unexplained malaise or muscle weakness, night pain/sweats, numbness or tingling    Objective:    Gait: decreased hip/knee flexion, decreased heel strike      Balance: SLS-     R: 0 sec.           L: 1 sec.    Palpation: TTP right proximal thigh    Flexibility: mod tight B HS, quad, HF      ROM    Hip AROM (Degrees)      (R)  (L)  Flexion: 100  120   Extension: 0  5    Abduction: 35  45    Adduction: 0  5    ER:  30  10    IR:  0  5            Strength Testing    LE strength MMT  R L  Hip flexion  3+/5 4+/5  Hip extension  3-/5 3+/5  Hip abduction  3+/5 4-/5  Hip adduction  3-/5 3+/5  Hip IR   3+/5 4/5  Hip ER   3+/5 4/5  Knee extension 3+/5 4/5  Knee flexion  4/5 5/5          Functional Screening  Sit to stand: unable to complete without UE support      Outcome Measures:  Other Measures  Lower Extremity Funtional Score (LEFS): 28/80     EDUCATION: home exercise program, plan of care, activity modifications, pain management, and injury pathology       Goals: Set and discussed today  Active       PT Problem       PT Goals       Start:  11/12/24    Expected End:  02/10/25       STG:  Patient will decrease pain to 0-2/10 in 4 weeks.   Patient will increase strength by >/= 1/2 mm grade in 4 weeks.  LEFS: +9 in 4 weeks.    LTG:  Patient will be able to walk >10 mins without increased pain in 8 weeks.  Patient will be able to put on socks and shoes independently in 8 weeks.   Patient will increase LE flexibility to min tight in 8 weeks.   Patient will be able to maintain SLS >/= 10 seconds in 8 weeks.                Plan of care was developed with input and agreement by the patient      Treatment Performed:  Access Code:  [email or web address]    Exercises  - Hooklying Clamshell with Resistance  - 2 x daily - 7 x weekly - 2 sets - 10 reps - 5 sec hold  - Supine Bridge  - 2 x daily - 7 x weekly - 2 sets - 10 reps - 5 sec hold  - Seated Long Arc Quad  - 2 x daily - 7 x weekly - 2 sets - 10 reps - 5 sec hold  - Supine Active Straight Leg Raise  - 2 x daily - 7 x weekly - 2 sets - 10 reps      Charges: Low complexity eval, TE, self care  Clinical Presentation: Stable  Prognosis/Rehab Potential: Good    Assessment: Patient presents with signs and symptoms consistent with right SURI, resulting in limited participation in pain-free ADLs and inability to perform at their prior level of function. Pt would benefit from physical therapy to address the impairments found & listed previously in the objective section in order to return to safe and pain-free ADLs and prior level of function.       Plan:     Planned Interventions include: therapeutic exercise, self-care home management, manual therapy, therapeutic activities, gait training, neuromuscular coordination, vasopneumatic, dry needling, aquatic therapy  Frequency: 1-2 x Week  Duration: 8 Weeks    Magda Knox, PT

## 2024-11-12 ENCOUNTER — EVALUATION (OUTPATIENT)
Dept: PHYSICAL THERAPY | Facility: CLINIC | Age: 69
End: 2024-11-12
Payer: MEDICARE

## 2024-11-12 DIAGNOSIS — M25.551 ACUTE RIGHT HIP PAIN: Primary | ICD-10-CM

## 2024-11-12 DIAGNOSIS — S32.461A CLOSED DISPLACED COMBINED TRANSVERSE-POSTERIOR FRACTURE OF RIGHT ACETABULUM, INITIAL ENCOUNTER (MULTI): ICD-10-CM

## 2024-11-12 DIAGNOSIS — M16.51 POST-TRAUMATIC OSTEOARTHRITIS OF RIGHT HIP: ICD-10-CM

## 2024-11-12 DIAGNOSIS — Z96.641 S/P TOTAL RIGHT HIP ARTHROPLASTY: ICD-10-CM

## 2024-11-12 PROCEDURE — 97535 SELF CARE MNGMENT TRAINING: CPT | Mod: GP

## 2024-11-12 PROCEDURE — 97110 THERAPEUTIC EXERCISES: CPT | Mod: GP

## 2024-11-12 PROCEDURE — 97161 PT EVAL LOW COMPLEX 20 MIN: CPT | Mod: GP

## 2024-11-12 ASSESSMENT — PAIN SCALES - GENERAL: PAINLEVEL_OUTOF10: 2

## 2024-11-12 ASSESSMENT — ENCOUNTER SYMPTOMS
OCCASIONAL FEELINGS OF UNSTEADINESS: 0
DEPRESSION: 0
LOSS OF SENSATION IN FEET: 0

## 2024-11-12 ASSESSMENT — PAIN - FUNCTIONAL ASSESSMENT: PAIN_FUNCTIONAL_ASSESSMENT: 0-10

## 2024-11-13 NOTE — PROGRESS NOTES
Physical Therapy  Physical Therapy Treatment    Patient Name: Nasir Camacho  MRN: [id number]  Today's Date: 11/14/2024  Time Calculation  Start Time: 1012  Stop Time: 1052  Time Calculation (min): 40 min    Insurance:  Visit [place]: 2 of [place]  Authorization info: no auth needed   Insurance Type: Medicare  Cert date start: 11/12/24        Cert date end: 2/10/25                 General:  Reason for visit: Right hip pain s/p SURI (anterior approach) and pinning of acetabulum 9/25/24  Referred by: Ivory Mederos PA-C       Current Problem  1. Acute right hip pain  Follow Up In Physical Therapy          Precautions  Precautions Comment: Anterior SURI, h/o stroke, high BP    Pain Assessment: 0-10  0-10 (Numeric) Pain Score: 2    Subjective:   Patient reports that right side was affected by his past stroke which makes it even weaker.   HEP Performed:  Yes    Objective:   Gait: shuffling gait with wheeled walker    Treatments:   R. Stepper, seat 6, L3, 5'  Gait training in parallel bars x 6 laps  Low hurdles step through pattern x 8 laps   Low hurdles lat steps x 8 laps   Slow airex march with UE support 2'  Fwd bosu lunges, 10x2 B  Standing hip abd 10x2 B  Standing hip ext 10x2 B  Standing heel raises 10x2   Sit to stand from elevated plinth 10x2    Access Code: [email or web address]     Charges: Tex2, Gaitx1    Assessment: Patient case complicated from weakness in RLE from previous stroke. Advised to look for AFO to use to prevent tripping.       Plan: Continue to progress LE strength, flexibility, balance and WB endurance for return to PLOF.    Magda Knox, PT

## 2024-11-14 ENCOUNTER — TREATMENT (OUTPATIENT)
Dept: PHYSICAL THERAPY | Facility: CLINIC | Age: 69
End: 2024-11-14
Payer: MEDICARE

## 2024-11-14 DIAGNOSIS — M25.551 ACUTE RIGHT HIP PAIN: Primary | ICD-10-CM

## 2024-11-14 PROCEDURE — 97116 GAIT TRAINING THERAPY: CPT | Mod: GP

## 2024-11-14 PROCEDURE — 97110 THERAPEUTIC EXERCISES: CPT | Mod: GP

## 2024-11-14 ASSESSMENT — PAIN SCALES - GENERAL: PAINLEVEL_OUTOF10: 2

## 2024-11-14 ASSESSMENT — PAIN - FUNCTIONAL ASSESSMENT: PAIN_FUNCTIONAL_ASSESSMENT: 0-10

## 2024-11-19 NOTE — PROGRESS NOTES
"Physical Therapy  Physical Therapy Treatment    Patient Name: Nasir Camacho  MRN: [id number]  Today's Date: 11/20/2024  Time Calculation  Start Time: 1130  Stop Time: 1210  Time Calculation (min): 40 min    Insurance:  Visit [place]: 3 of [place]  Authorization info: no auth needed   Insurance Type: Medicare  Cert date start: 11/12/24        Cert date end: 2/10/25                 General:  Reason for visit: Right hip pain s/p SURI (anterior approach) and pinning of acetabulum 9/25/24  Referred by: Ivory Mederos PA-C       Current Problem  1. Acute right hip pain  Follow Up In Physical Therapy          Precautions  Precautions Comment: Anterior SURI, h/o stroke, high BP    Pain Assessment: 0-10  0-10 (Numeric) Pain Score: 2    Subjective:   Patient reports that he forgot to look for his R AFO. Feels like he is improving.     HEP Performed:  Yes    Objective:   Gait: shuffling gait with wheeled walker    Treatments:   R. Stepper, seat 6, L5, 5'  Gait training with cane in parallel bars x 6 laps  Low hurdles step through pattern x 8 laps   Low hurdles lat steps x 8 laps  6\" step ups with 1 UE support 10x B   Slow airex march with 2 finger UE support 2'  Fwd bosu lunges, 10x2 B  YTB standing hip abd 10x2 B  YTB standing hip ext 10x2 B  YTB standing hip flexion 10x2 B  Standing heel raises 15x2   Sit to stand from elevated plinth 10x2    Access Code: [email or web address]     Charges: Tex2, Gaitx1    Assessment: Patient advised that he can use the cane in his home but is not stable enough to use it out in the community at this time.       Plan: Continue to progress LE strength, flexibility, balance and WB endurance for return to [facility].    Magda Knox, PT  "

## 2024-11-20 ENCOUNTER — TREATMENT (OUTPATIENT)
Dept: PHYSICAL THERAPY | Facility: CLINIC | Age: 69
End: 2024-11-20
Payer: MEDICARE

## 2024-11-20 DIAGNOSIS — M25.551 ACUTE RIGHT HIP PAIN: Primary | ICD-10-CM

## 2024-11-20 PROCEDURE — 97116 GAIT TRAINING THERAPY: CPT | Mod: GP

## 2024-11-20 PROCEDURE — 97110 THERAPEUTIC EXERCISES: CPT | Mod: GP

## 2024-11-20 ASSESSMENT — PAIN - FUNCTIONAL ASSESSMENT: PAIN_FUNCTIONAL_ASSESSMENT: 0-10

## 2024-11-20 ASSESSMENT — PAIN SCALES - GENERAL: PAINLEVEL_OUTOF10: 2

## 2024-11-21 NOTE — PROGRESS NOTES
"Physical Therapy  Physical Therapy Treatment    Patient Name: Nasir Camacho  MRN: [id number]  Today's Date: 11/22/2024  Time Calculation  Start Time: 1020  Stop Time: 1100  Time Calculation (min): 40 min    Insurance:  Visit [place]: 4 of [place]  Authorization info: no auth needed   Insurance Type: Medicare  Cert date start: 11/12/24        Cert date end: 2/10/25                 General:  Reason for visit: Right hip pain s/p SURI (anterior approach) and pinning of acetabulum 9/25/24  Referred by: Ivory Mederos PA-C       Current Problem  1. Acute right hip pain  Follow Up In Physical Therapy          Precautions  Precautions Comment: Anterior SURI, h/o stroke, high BP    Pain Assessment: 0-10  0-10 (Numeric) Pain Score: 2    Subjective:   Patient reports that he can't find his old AFO. Has been practicing with cane in his home.     HEP Performed:  Yes    Objective:   Gait: decreased RLE heel strike with wheeled walker    Treatments:   AMELIA Stepper, seat 6, L5, 5'  Gait training with cane in parallel bars x 6 laps  Low hurdles step through pattern x 8 laps   Low hurdles lat steps x 8 laps  6\" step ups with 1 UE support 10x B   Slow airex march with 2 finger UE support 2'  Fwd jhonathan sommers, 10x2 B  Standing heel raises 15x2   Sit to stand from elevated plinth 10x2  Gait training with cane in [place] 50 ft x 4    Access Code: [email or web address]     Charges: Tex2, Gaitx1    Assessment: Patient will start timing his walking with cane at home in order to build walking endurance.       Plan: Continue to progress LE strength, flexibility, balance and WB endurance for return to [facility].    Magda Knox, PT  "

## 2024-11-22 ENCOUNTER — TREATMENT (OUTPATIENT)
Dept: PHYSICAL THERAPY | Facility: CLINIC | Age: 69
End: 2024-11-22
Payer: MEDICARE

## 2024-11-22 DIAGNOSIS — M25.551 ACUTE RIGHT HIP PAIN: Primary | ICD-10-CM

## 2024-11-22 PROCEDURE — 97110 THERAPEUTIC EXERCISES: CPT | Mod: GP

## 2024-11-22 PROCEDURE — 97116 GAIT TRAINING THERAPY: CPT | Mod: GP

## 2024-11-22 ASSESSMENT — PAIN SCALES - GENERAL: PAINLEVEL_OUTOF10: 2

## 2024-11-22 ASSESSMENT — PAIN - FUNCTIONAL ASSESSMENT: PAIN_FUNCTIONAL_ASSESSMENT: 0-10

## 2024-11-22 NOTE — PROGRESS NOTES
Physical Therapy  Physical Therapy Treatment    Patient Name: Nasir Camacho  MRN: [id number]  Today's Date: 11/25/2024  Time Calculation  Start Time: 1030  Stop Time: 1110  Time Calculation (min): 40 min    Insurance:  Visit [place]: 5 of [place]  Authorization info: no auth needed   Insurance Type: Medicare  Cert date start: 11/12/24        Cert date end: 2/10/25                 General:  Reason for visit: Right hip pain s/p SURI (anterior approach) and pinning of acetabulum 9/25/24  Referred by: Ivory Mederos PA-C       Current Problem  1. Acute right hip pain  Follow Up In Physical Therapy          Precautions  Precautions Comment: Anterior SURI, h/o stroke, high BP    Pain Assessment: 0-10  0-10 (Numeric) Pain Score: 1    Subjective:   Patient reports that he is feeling more comfortable walking without a cane at home with even weight distribution.     HEP Performed:  Yes    Objective:   Gait: improved even weight distribution with ambulation using cane    Treatments:   R. Stepper, seat 6, L5, 5'  Leg press 50#, 10x3 (RTB around thighs)  HSC 30#, 10x3  Cybex hip abd/add 25#, 15x2 each  Slow airex march with 2 finger UE support 2'  Fwd bosu lunges, 10x2 B  Sit to stand from elevated plinth 10x2    Access Code: [email or web address]     Charges: Tex3    Assessment: Patient challenged by addition of weight machines today. Difficult to keep right foot on leg press.      Plan: Continue to progress LE strength, flexibility, balance and WB endurance for return to PLOF.    Magda Knox, PT

## 2024-11-25 ENCOUNTER — TREATMENT (OUTPATIENT)
Dept: PHYSICAL THERAPY | Facility: CLINIC | Age: 69
End: 2024-11-25
Payer: MEDICARE

## 2024-11-25 DIAGNOSIS — M25.551 ACUTE RIGHT HIP PAIN: Primary | ICD-10-CM

## 2024-11-25 PROCEDURE — 97110 THERAPEUTIC EXERCISES: CPT | Mod: GP

## 2024-11-25 ASSESSMENT — PAIN SCALES - GENERAL: PAINLEVEL_OUTOF10: 1

## 2024-11-25 ASSESSMENT — PAIN - FUNCTIONAL ASSESSMENT: PAIN_FUNCTIONAL_ASSESSMENT: 0-10

## 2024-12-02 ENCOUNTER — TREATMENT (OUTPATIENT)
Dept: PHYSICAL THERAPY | Facility: CLINIC | Age: 69
End: 2024-12-02
Payer: MEDICARE

## 2024-12-02 DIAGNOSIS — M25.551 ACUTE RIGHT HIP PAIN: Primary | ICD-10-CM

## 2024-12-02 PROCEDURE — 97110 THERAPEUTIC EXERCISES: CPT | Mod: GP

## 2024-12-02 ASSESSMENT — PAIN SCALES - GENERAL: PAINLEVEL_OUTOF10: 1

## 2024-12-02 ASSESSMENT — PAIN - FUNCTIONAL ASSESSMENT: PAIN_FUNCTIONAL_ASSESSMENT: 0-10

## 2024-12-02 NOTE — PROGRESS NOTES
Physical Therapy  Physical Therapy Treatment    Patient Name: Nasir Camacho  MRN: [id number]  Today's Date: 12/2/2024  Time Calculation  Start Time: 1033  Stop Time: 1112  Time Calculation (min): 39 min    Insurance:  Visit [place]: 6 of [place]  Authorization info: no auth needed   Insurance Type: Medicare  Cert date start: 11/12/24        Cert date end: 2/10/25                 General:  Reason for visit: Right hip pain s/p SURI (anterior approach) and pinning of acetabulum 9/25/24  Referred by: Ivory Mederos PA-C       Current Problem  1. Acute right hip pain  Follow Up In Physical Therapy          Precautions  Precautions Comment: Anterior SURI, h/o stroke, high BP    Pain Assessment: 0-10  0-10 (Numeric) Pain Score: 1    Subjective:   Patient reports that his knee was bothering him over the weekend so he took a break from the exercises. Has been using his cane in his home.    HEP Performed:  Yes    Objective:   Gait: improved even weight distribution with ambulation using cane    Treatments:   R. Stepper, seat 6, L5, 5'  Cybex hip abd/add 25#, 15x2 each  Fwd bosu lunges, 15x2 B  HSC 35#, 15x  Slow airex march with 2 finger UE support 2'  Sit to stand from elevated plinth 10x  Stair HS stretch 1' B    Access Code: [email or web address]     Charges: Tex3    Assessment: Patient able to increase reps today due to improved muscular endurance.       Plan: Continue to progress LE strength, flexibility, balance and WB endurance for return to PLOF.    Magda Knox, PT

## 2024-12-03 NOTE — PROGRESS NOTES
"Physical Therapy  Physical Therapy Treatment    Patient Name: Nasir Camacho  MRN: [id number]  Today's Date: 12/4/2024  Time Calculation  Start Time: 1000  Stop Time: 1040  Time Calculation (min): 40 min    Insurance:  Visit number: 7 of [place]  Authorization info: no auth needed   Insurance Type: Medicare  Cert date start: 11/12/24        Cert date end: 2/10/25                 General:  Reason for visit: Right hip pain s/p SURI (anterior approach) and pinning of acetabulum 9/25/24  Referred by: Ivory Mederos PA-C       Current Problem  1. Acute right hip pain  Follow Up In Physical Therapy          Precautions  Precautions Comment: Anterior SURI, h/o stroke, high BP    Pain Assessment: 0-10  0-10 (Numeric) Pain Score: 1    Subjective:   Patient reports that his knee bothers him occasionally, more so when he uses his cane vs his walker.     HEP Performed:  Yes    Objective:   Gait: improved even weight distribution with ambulation using cane    Treatments:   R. Stepper, seat 6, L5, 5'  Cybex hip abd/add 25#, 15x2 each  Fwd bosu lunges, 15x2 B  HSC 35#, 15x2  6\" step ups x 15 B  6\" lat step ups x 10 B  Slow airex march with 2 finger UE support 2'  Sit to stand from elevated plinth 10x2  Stair HS stretch 1' B    Access Code: [email or web address]     Charges: Tex3    Assessment: Patient able to complete sit to stand from lower plinth height today.      Plan: Assess for discharge next visit.    Magda Knox, PT  "

## 2024-12-04 ENCOUNTER — TREATMENT (OUTPATIENT)
Dept: PHYSICAL THERAPY | Facility: CLINIC | Age: 69
End: 2024-12-04
Payer: MEDICARE

## 2024-12-04 DIAGNOSIS — M25.551 ACUTE RIGHT HIP PAIN: Primary | ICD-10-CM

## 2024-12-04 PROCEDURE — 97110 THERAPEUTIC EXERCISES: CPT | Mod: GP

## 2024-12-04 ASSESSMENT — PAIN - FUNCTIONAL ASSESSMENT: PAIN_FUNCTIONAL_ASSESSMENT: 0-10

## 2024-12-04 ASSESSMENT — PAIN SCALES - GENERAL: PAINLEVEL_OUTOF10: 1

## 2024-12-05 NOTE — PROGRESS NOTES
Physical Therapy  Physical Therapy Orthopedic Progress Note and Discharge Note    Patient Name: Nasir Camacho  MRN: [id number]  Today's Date: 12/6/2024  Time Calculation  Start Time: 1200  Stop Time: 1225  Time Calculation (min): 25 min    Insurance:  Visit number: 8 of [place]  Authorization info: no auth needed   Insurance Type: Medicare  Cert date start: 11/12/24        Cert date end: 2/10/25                 General:  Reason for visit: Right hip pain s/p SURI (anterior approach) and pinning of acetabulum 9/25/24  Referred by: Ivory Mederos PA-C    Current Problem  1. Acute right hip pain  Follow Up In Physical Therapy          Precautions  Precautions Comment: Anterior SURI, h/o stroke, high BP      Subjective:    Patient reports that his hip is feeling good and will continue to strengthen independently at this time. Understands that some of the weakness will likely always be on the right side due to his stroke. Plans to start working with a .     Current Condition:   Better    Pain:  Pain Assessment: 0-10  0-10 (Numeric) Pain Score: 1    Self Reported Function (0-100%) = 50%  (100% being back to PLOF)    Objective:    Gait: improved kike with wheeled walker and cane                      Balance: SLS-     R: 3 sec.           L: 3 sec.     Palpation: denies TTP     Flexibility: min tight B HS, quad, HF        ROM     Hip AROM (Degrees)                             (R)                    (L)  Flexion:            110                   120         Extension:        5                      10                        Abduction:       35                     45                       Adduction:       0                      5                        ER:                    30                     10                       IR:                     0                      5                                                         Strength Testing     LE strength MMT          R         L  Hip flexion                   4/5      5/5  Hip extension              3+/5    4-/5  Hip abduction              4-/5     4/5  Hip adduction              3+/5      4-/5  Hip IR                           4-/5     4/5  Hip ER                          4-/5     4/5  Knee extension            4/5     4+/5  Knee flexion                 4+/5       5/5                                       Functional Screening  Sit to stand: able to complete without UE support      Outcome Measures: Updated 12/6/2024  Other Measures  Lower Extremity Funtional Score (LEFS): 42/80     EDUCATION: home exercise program       Goals: Updated 12/6/2024  Resolved       PT Problem       PT Goals (Met)       Start:  11/12/24    Expected End:  02/10/25    Resolved:  12/06/24    STG:  Patient will decrease pain to 0-2/10 in 4 weeks.   Patient will increase strength by >/= 1/2 mm grade in 4 weeks.  LEFS: +9 in 4 weeks.    LTG:  Patient will be able to walk >10 mins without increased pain in 8 weeks.  Patient will be able to put on socks and shoes independently in 8 weeks.   Patient will increase LE flexibility to min tight in 8 weeks.   Patient will be able to maintain SLS >/= 10 seconds in 8 weeks.                Plan of care was developed with input and agreement by the patient      Treatment Performed:  R. Stepper, seat 6, L5, 5'  Recheck completed  Reviewed HEP     Access Code: [email or web address]     Charges: Tex2    Assessment: Patient has demonstrated significant improvement in LE strength, flexibility, balance and WB endurance. Is back to all functional activities. Reviewed HEP and answered all questions. Advised patient to use walker when out in the bad weather and the cane at home for safety. Discussed starting to work with a .    Plan: Updated 12/6/2024    Patient has met most goals and will be discharged to independent HEP today.    Magda Knox, PT

## 2024-12-06 ENCOUNTER — TREATMENT (OUTPATIENT)
Dept: PHYSICAL THERAPY | Facility: CLINIC | Age: 69
End: 2024-12-06
Payer: MEDICARE

## 2024-12-06 DIAGNOSIS — M25.551 ACUTE RIGHT HIP PAIN: Primary | ICD-10-CM

## 2024-12-06 PROCEDURE — 97110 THERAPEUTIC EXERCISES: CPT | Mod: GP

## 2024-12-06 ASSESSMENT — PAIN SCALES - GENERAL: PAINLEVEL_OUTOF10: 1

## 2024-12-06 ASSESSMENT — PAIN - FUNCTIONAL ASSESSMENT: PAIN_FUNCTIONAL_ASSESSMENT: 0-10

## 2025-01-06 ENCOUNTER — APPOINTMENT (OUTPATIENT)
Dept: ORTHOPEDIC SURGERY | Facility: CLINIC | Age: 70
End: 2025-01-06
Payer: MEDICARE

## 2025-01-13 ENCOUNTER — HOSPITAL ENCOUNTER (OUTPATIENT)
Dept: RADIOLOGY | Facility: CLINIC | Age: 70
Discharge: HOME | End: 2025-01-13
Payer: MEDICARE

## 2025-01-13 ENCOUNTER — OFFICE VISIT (OUTPATIENT)
Dept: ORTHOPEDIC SURGERY | Facility: CLINIC | Age: 70
End: 2025-01-13
Payer: MEDICARE

## 2025-01-13 DIAGNOSIS — S32.461A CLOSED DISPLACED COMBINED TRANSVERSE-POSTERIOR FRACTURE OF RIGHT ACETABULUM, INITIAL ENCOUNTER (MULTI): ICD-10-CM

## 2025-01-13 DIAGNOSIS — M16.51 POST-TRAUMATIC OSTEOARTHRITIS OF RIGHT HIP: ICD-10-CM

## 2025-01-13 DIAGNOSIS — Z96.641 AFTERCARE FOLLOWING RIGHT HIP JOINT REPLACEMENT SURGERY: Primary | ICD-10-CM

## 2025-01-13 DIAGNOSIS — Z47.1 AFTERCARE FOLLOWING RIGHT HIP JOINT REPLACEMENT SURGERY: Primary | ICD-10-CM

## 2025-01-13 PROCEDURE — G2211 COMPLEX E/M VISIT ADD ON: HCPCS | Performed by: ORTHOPAEDIC SURGERY

## 2025-01-13 PROCEDURE — 73503 X-RAY EXAM HIP UNI 4/> VIEWS: CPT | Performed by: RADIOLOGY

## 2025-01-13 PROCEDURE — 73502 X-RAY EXAM HIP UNI 2-3 VIEWS: CPT | Mod: RT

## 2025-01-13 PROCEDURE — 99214 OFFICE O/P EST MOD 30 MIN: CPT | Performed by: ORTHOPAEDIC SURGERY

## 2025-01-13 PROCEDURE — 72170 X-RAY EXAM OF PELVIS: CPT

## 2025-01-13 NOTE — PROGRESS NOTES
Subjective    Patient ID: Nasir Camacho is a 69 y.o. male.    Chief Complaint: No chief complaint on file.     Last Surgery: Arthroplasty Total Hip Anterior Approach - Right, Removal Hardware Pelvis - Right, and Pelvic Fracture Percutaneous Fixation - Right  Last Surgery Date: 9/25/2024    HPI  Patient is a 69 y.o. male who is s/p Right Direct Anterior Total hip Conversion with right pelvic percutaneous fixation. Date of surgery was 9/25/2024.  Patient reports his pain has improved.  However he still has some pain.  He is able to ambulate using a cane now instead of using the walker.  Patient reports that he was discharged from outpatient physical therapy and over the last 2 weeks have been working as a .  He rates his pain as mild at this point.  He feels like he is still deconditioned and has weakness in the leg.  Overall he feels very happy with his progress.  No wound healing problem.  ROS: All other systems have been reviewed and are negative except as previously noted in history of present illness.      IMP:  Problem List Items Addressed This Visit       Closed displaced associated transverse-posterior fracture of right acetabulum (Multi)    Post-traumatic osteoarthritis of right hip    Relevant Orders    XR hip right with pelvis when performed 2 or 3 views     Other Visit Diagnoses       Aftercare following right hip joint replacement surgery    -  Primary          Objective   General: Alert and oriented x 3, NAD, respirations easy and unlabored with no audible wheezes, skin warm and dry, speech and dress appropriate for noted age, affect euthymic.     Musculoskeletal: Right Lower Extremity  incisions c/d/i  No swelling to lower leg  compartments soft  no calf tenderness  sensation intact to light touch  motor intact including TA/GS/EHL  palpable DP/PT pulses 2+   No pain rotation range of motion of the hip.    X-ray: Images of pelvis reviewed personally by me today and reveal maintenance of  alignment of acetabulum fracture with hardware in position and no interval change.  There is interval callus formation and healing of the acetabular fracture.  This was confirmed on the recent CT scan that he will obtain for unrelated reason.  Images of right hip reviewed personally by me today and reveal maintenance of alignment of prosthesis with hardware in position and no interval change. No loosening noted. No lucency. Stable appearance.       Assessment/Plan   Encounter Diagnoses:  Aftercare following right hip joint replacement surgery    Post-traumatic osteoarthritis of right hip    Closed displaced combined transverse-posterior fracture of right acetabulum, initial encounter (Multi)    PLAN: Patient is s/p Right Direct Anterior Total hip Conversion with right pelvic percutaneous fixation.  His fracture is healing well.  His hip prosthesis is stable.  Patient has functional improvement and his pain is mild at this point.  At this time patient will continue activities as tolerated without any restrictions. We discussed focusing on physical therapy range of motion and strengthening exercises.  Patient was pretty deconditioned with prolonged immobilization after his acetabular fracture and discussed with him that this will slow down his recovery.  However he is making steady progress.  He will use assistive device as tolerated.  He may return back to driving whenever he feels ready.  No restrictions at this time.  We discussed routine care for hip replacement surgery.  I will see him back in 3 months obtain x-ray of the right hip at that time.  Orders Placed This Encounter    XR hip right with pelvis when performed 2 or 3 views     No follow-ups on file.

## 2025-01-13 NOTE — PROGRESS NOTES
HPI    69 y.o. male being seen with the following problem list:    Problem list:  Hip fracture 6/2024  Bothersome urinary frequency     07/17/24 -seen in consultation.  At baseline has some bothersome urinary frequency, but since his pelvic fracture is gotten much worse.  Is now getting somewhat better.  Started on Flomax but this did not help.  PVR today 64 ml.  No gross hematuria.  He reports no gross hematuria around the time of his pelvic fracture.  No UTIs.  Is BRCA positive.  No history of prostate cancer.     09/11/24 - here for cysto. OAB symptoms are better on vesicare, but not where he wants to be.     01/15/25 - Patient is now s/p right hip removal of hardware and right total hip arthroplasty, ORIF R acetabulum on 9/25 by Dr. Kerr. Feels like his urinary symptom have improved, frequency improved 4-5x during the day, bothered by nocturia 3-4x.         Lab Results   Component Value Date    PSA 1.94 07/17/2024    PSA 1.91 04/01/2024    PSA 3.87 03/29/2021    PSA 1.32 06/25/2018              Current Medications:  Current Outpatient Medications   Medication Sig Dispense Refill    acetaminophen (Tylenol) 325 mg tablet Take 2 tablets (650 mg) by mouth every 6 hours if needed for mild pain (1 - 3). 60 tablet 3    amLODIPine (Norvasc) 10 mg tablet 1/2 tablet or 5 mg every day (Patient taking differently: Patient is currently taking #1 tablet daily) 90 tablet 3    aspirin 81 mg chewable tablet Chew 1 tablet (81 mg) once daily.      atorvastatin (Lipitor) 40 mg tablet Take 1 tablet (40 mg) by mouth once daily. 90 tablet 3    calcium carbonate-vitamin D3 (Oscal-500) 500 mg-10 mcg (400 unit) tablet Take 1 tablet by mouth 2 times a day. 60 tablet 3    carvedilol (Coreg) 3.125 mg tablet TAKE 1 TABLET BY MOUTH TWICE A DAY WITH FOOD 180 tablet 3    cholecalciferol (Vitamin D-3) 25 MCG (1000 UT) tablet Take 1 tablet (25 mcg) by mouth once daily.      citalopram (CeleXA) 20 mg tablet TAKE 1 TABLET BY MOUTH EVERY DAY 90  tablet 3    losartan (Cozaar) 50 mg tablet TAKE 1 TABLET BY MOUTH EVERY DAY 90 tablet 3    mv-min-folic-K1-lycopen-lutein (Centrum Silver Men) 711--300 mcg tablet Take 1 tablet by mouth once daily. Do not fill before October 13, 2024.      oxybutynin (Ditropan) 5 mg tablet Take 1 tablet (5 mg) by mouth once daily at bedtime. Take 1-3 tabs as needed before bedtime 90 tablet 3    polyethylene glycol (Glycolax, Miralax) 17 gram packet Take 17 g by mouth once daily as needed (constipation).      tamsulosin (Flomax) 0.4 mg 24 hr capsule Take 1 capsule (0.4 mg) by mouth once daily. 15 capsule 0     No current facility-administered medications for this visit.        Active Problems:  Nasir Camacho is a 69 y.o. male with the following Problems and Medications.  Patient Active Problem List   Diagnosis    Subacromial bursitis    Stroke (Multi)    Sprain of shoulder    Skin changes due to chronic exposure to nonionizing radiation, unspecified    Sensation of foreign body in eye    S/P TAVR (transcatheter aortic valve replacement)    Pulmonary nodule    Prostate disorder    Pain of right lower extremity    Other seborrheic keratosis    Inflamed seborrheic keratosis    Neuropathic pain of right lower extremity    Nephrolithiasis    Myopia with presbyopia    Myopia of both eyes    Melanocytic nevi of other parts of face    Laceration of right thumb    Hyperthyroidism    Hypercholesterolemia    Hordeolum externum of right upper eyelid    History of non-ST elevation myocardial infarction (NSTEMI)    Hemiparesis affecting dominant side as late effect of stroke (Multi)    Hematuria    Hemangioma of skin and subcutaneous tissue    Headache    Essential hypertension    Elevated PSA    Depression    MGD (meibomian gland disease)    Arteriosclerotic cardiovascular disease (ASCVD)    Accelerated hypertension    Abdominal pain    ICH (intracerebral hemorrhage) (Multi)    Fall, initial encounter    Fracture of anterior wall of  acetabulum    Urinary frequency    Screening PSA (prostate specific antigen)    Closed displaced associated transverse-posterior fracture of right acetabulum (Multi)    Post-traumatic osteoarthritis of right hip    Presence of retained hardware    Benign prostatic hyperplasia (BPH) with urinary urgency    Hip osteoarthritis    Acute right hip pain     Current Outpatient Medications   Medication Sig Dispense Refill    acetaminophen (Tylenol) 325 mg tablet Take 2 tablets (650 mg) by mouth every 6 hours if needed for mild pain (1 - 3). 60 tablet 3    amLODIPine (Norvasc) 10 mg tablet 1/2 tablet or 5 mg every day (Patient taking differently: Patient is currently taking #1 tablet daily) 90 tablet 3    aspirin 81 mg chewable tablet Chew 1 tablet (81 mg) once daily.      atorvastatin (Lipitor) 40 mg tablet Take 1 tablet (40 mg) by mouth once daily. 90 tablet 3    calcium carbonate-vitamin D3 (Oscal-500) 500 mg-10 mcg (400 unit) tablet Take 1 tablet by mouth 2 times a day. 60 tablet 3    carvedilol (Coreg) 3.125 mg tablet TAKE 1 TABLET BY MOUTH TWICE A DAY WITH FOOD 180 tablet 3    cholecalciferol (Vitamin D-3) 25 MCG (1000 UT) tablet Take 1 tablet (25 mcg) by mouth once daily.      citalopram (CeleXA) 20 mg tablet TAKE 1 TABLET BY MOUTH EVERY DAY 90 tablet 3    losartan (Cozaar) 50 mg tablet TAKE 1 TABLET BY MOUTH EVERY DAY 90 tablet 3    mv-min-folic-K1-lycopen-lutein (Centrum Silver Men) 352--300 mcg tablet Take 1 tablet by mouth once daily. Do not fill before October 13, 2024.      oxybutynin (Ditropan) 5 mg tablet Take 1 tablet (5 mg) by mouth once daily at bedtime. Take 1-3 tabs as needed before bedtime 90 tablet 3    polyethylene glycol (Glycolax, Miralax) 17 gram packet Take 17 g by mouth once daily as needed (constipation).      tamsulosin (Flomax) 0.4 mg 24 hr capsule Take 1 capsule (0.4 mg) by mouth once daily. 15 capsule 0     No current facility-administered medications for this visit.       PMH:  Past  Medical History:   Diagnosis Date    Aortic stenosis     s/p TAVR 6/29/21    BRCA positive     Chronic pain disorder     Coronary artery disease     COVID-19 09/23/2022    Displaced fracture of right acetabulum     Graves disease     Hip fracture (Multi)     Hyperlipidemia     Hypertension     Hyperthyroidism     Myocardial infarction (Multi) 1999    acute plaque rupture in the LAD    Pelvic fracture (Multi)     Pulmonary nodule     stable    Stroke (Multi) 05/2016    hemorrhagic stroke with right hemiparesis in 2016       PSH:  Past Surgical History:   Procedure Laterality Date    ANKLE SURGERY      CARDIAC CATHETERIZATION      CARDIAC VALVE REPLACEMENT  06/29/2021    transcatheter aortic valve replacement    CORONARY ANGIOPLASTY WITH STENT PLACEMENT  10/06/2014    Cath Stent Placement    CT ANGIO NECK  07/18/2021    CT NECK ANGIO W AND WO IV CONTRAST 7/18/2021 [facility] [place] [facility]GAC[facility]    CT HEAD ANGIO W AND WO IV CONTRAST  07/18/2021    CT HEAD ANGIO W AND WO IV CONTRAST 7/18/2021 [facility] [place] CL[facility]GAC[facility]    FEMUR FRACTURE SURGERY  11/06/2014    Femur Repair       FMH:  No family history on file.    SHx:  Social History     Tobacco Use    Smoking status: Never    Smokeless tobacco: Never   Substance Use Topics    Alcohol use: Yes     Comment: one drink weekly    Drug use: Never       Allergies:  Allergies   Allergen Reactions    Penicillin Unknown         Assessment/Plan  His OAB symptoms improved post his knee surgery but continues to be bothersome, mostly bothered by his nocturia. Discussed options including different medication vs neuromodulation vs continuing the same way. Advise to stop Vesicare, will order Oxybutynin IR 5mg, he can take 1-3 pills qhs, will slowly increase. Discussed s/e.    Follow up in 6 weeks over  for symptom check.     Scribe Attestation  By signing my name below, I, Brandt Rincon, attest that this documentation has been prepared under the direction and in the presence of Landon ELLISON  MD Charli.

## 2025-01-15 ENCOUNTER — OFFICE VISIT (OUTPATIENT)
Dept: UROLOGY | Facility: HOSPITAL | Age: 70
End: 2025-01-15
Payer: MEDICARE

## 2025-01-15 DIAGNOSIS — R35.1 NOCTURIA: Primary | ICD-10-CM

## 2025-01-15 PROCEDURE — 1159F MED LIST DOCD IN RCRD: CPT | Performed by: UROLOGY

## 2025-01-15 PROCEDURE — G2211 COMPLEX E/M VISIT ADD ON: HCPCS | Performed by: UROLOGY

## 2025-01-15 PROCEDURE — 1036F TOBACCO NON-USER: CPT | Performed by: UROLOGY

## 2025-01-15 PROCEDURE — 99214 OFFICE O/P EST MOD 30 MIN: CPT | Performed by: UROLOGY

## 2025-01-15 RX ORDER — OXYBUTYNIN CHLORIDE 5 MG/1
5 TABLET ORAL NIGHTLY
Qty: 90 TABLET | Refills: 3 | Status: SHIPPED | OUTPATIENT
Start: 2025-01-15 | End: 2026-01-15

## 2025-04-01 NOTE — PROGRESS NOTES
HPI    70 y.o. male being seen with the following problem list:    Problem list:  Hip fracture 6/2024  Bothersome urinary frequency     07/17/24 -seen in consultation.  At baseline has some bothersome urinary frequency, but since his pelvic fracture is gotten much worse.  Is now getting somewhat better.  Started on Flomax but this did not help.  PVR today 64 ml.  No gross hematuria.  He reports no gross hematuria around the time of his pelvic fracture.  No UTIs.  Is BRCA positive.  No history of prostate cancer.     09/11/24 - here for cysto. OAB symptoms are better on vesicare, but not where he wants to be.      01/15/25 - Patient is now s/p right hip removal of hardware and right total hip arthroplasty, ORIF R acetabulum on 9/25 by Dr. Kerr. Feels like his urinary symptom have improved, frequency improved 4-5x during the day, bothered by nocturia 3-4x. Advise to stop Vesicare, will order Oxybutynin IR 5mg, he can take 1-3 pills qhs, will slowly increase.     04/03/25 - Seen today over telehealth, performed this visit using real-time telehealth tools, including an audio/video connection between Nasir Camacho at home and Landon Augustin MD at [facility]. Consent for telemedicine visit was obtained.   Has been having daytime urgency and urge incontinence, not new. Nocturia down to 2x on Oxybutynin IR. Reports Red specs in urine.      Lab Results   Component Value Date    PSA 1.94 07/17/2024    PSA 1.91 04/01/2024    PSA 3.87 03/29/2021    PSA 1.32 06/25/2018              Current Medications:  Current Outpatient Medications   Medication Sig Dispense Refill    acetaminophen (Tylenol) 325 mg tablet Take 2 tablets (650 mg) by mouth every 6 hours if needed for mild pain (1 - 3). 60 tablet 3    amLODIPine (Norvasc) 10 mg tablet 1/2 tablet or 5 mg every day (Patient taking differently: Patient is currently taking #1 tablet daily) 90 tablet 3    aspirin 81 mg chewable tablet Chew 1 tablet (81 mg) once daily.       atorvastatin (Lipitor) 40 mg tablet Take 1 tablet (40 mg) by mouth once daily. 90 tablet 3    calcium carbonate-vitamin D3 (Oscal-500) 500 mg-10 mcg (400 unit) tablet Take 1 tablet by mouth 2 times a day. 60 tablet 3    carvedilol (Coreg) 3.125 mg tablet TAKE 1 TABLET BY MOUTH TWICE A DAY WITH FOOD 180 tablet 3    cholecalciferol (Vitamin D-3) 25 MCG (1000 UT) tablet Take 1 tablet (25 mcg) by mouth once daily.      citalopram (CeleXA) 20 mg tablet TAKE 1 TABLET BY MOUTH EVERY DAY 90 tablet 3    losartan (Cozaar) 50 mg tablet TAKE 1 TABLET BY MOUTH EVERY DAY 90 tablet 3    mv-min-folic-K1-lycopen-lutein (Centrum Silver Men) 673--300 mcg tablet Take 1 tablet by mouth once daily. Do not fill before October 13, 2024.      oxybutynin (Ditropan) 5 mg tablet Take 1 tablet (5 mg) by mouth once daily at bedtime. Take 1-3 tabs as needed before bedtime 90 tablet 3    polyethylene glycol (Glycolax, Miralax) 17 gram packet Take 17 g by mouth once daily as needed (constipation).      tamsulosin (Flomax) 0.4 mg 24 hr capsule Take 1 capsule (0.4 mg) by mouth once daily. 15 capsule 0     No current facility-administered medications for this visit.        Active Problems:  Nasir Camacho is a 70 y.o. male with the following Problems and Medications.  Patient Active Problem List   Diagnosis    Subacromial bursitis    Stroke (Multi)    Sprain of shoulder    Skin changes due to chronic exposure to nonionizing radiation, unspecified    Sensation of foreign body in eye    S/P TAVR (transcatheter aortic valve replacement)    Pulmonary nodule    Prostate disorder    Pain of right lower extremity    Other seborrheic keratosis    Inflamed seborrheic keratosis    Neuropathic pain of right lower extremity    Nephrolithiasis    Myopia with presbyopia    Myopia of both eyes    Melanocytic nevi of other parts of face    Laceration of right thumb    Hyperthyroidism    Hypercholesterolemia    Hordeolum externum of right upper eyelid     History of non-ST elevation myocardial infarction (NSTEMI)    Hemiparesis affecting dominant side as late effect of stroke (Multi)    Hematuria    Hemangioma of skin and subcutaneous tissue    Headache    Essential hypertension    Elevated PSA    Depression    MGD (meibomian gland disease)    Arteriosclerotic cardiovascular disease (ASCVD)    Accelerated hypertension    Abdominal pain    ICH (intracerebral hemorrhage) (Multi)    Fall, initial encounter    Fracture of anterior wall of acetabulum    Urinary frequency    Screening PSA (prostate specific antigen)    Closed displaced associated transverse-posterior fracture of right acetabulum    Post-traumatic osteoarthritis of right hip    Presence of retained hardware    Benign prostatic hyperplasia (BPH) with urinary urgency    Hip osteoarthritis    Acute right hip pain     Current Outpatient Medications   Medication Sig Dispense Refill    acetaminophen (Tylenol) 325 mg tablet Take 2 tablets (650 mg) by mouth every 6 hours if needed for mild pain (1 - 3). 60 tablet 3    amLODIPine (Norvasc) 10 mg tablet 1/2 tablet or 5 mg every day (Patient taking differently: Patient is currently taking #1 tablet daily) 90 tablet 3    aspirin 81 mg chewable tablet Chew 1 tablet (81 mg) once daily.      atorvastatin (Lipitor) 40 mg tablet Take 1 tablet (40 mg) by mouth once daily. 90 tablet 3    calcium carbonate-vitamin D3 (Oscal-500) 500 mg-10 mcg (400 unit) tablet Take 1 tablet by mouth 2 times a day. 60 tablet 3    carvedilol (Coreg) 3.125 mg tablet TAKE 1 TABLET BY MOUTH TWICE A DAY WITH FOOD 180 tablet 3    cholecalciferol (Vitamin D-3) 25 MCG (1000 UT) tablet Take 1 tablet (25 mcg) by mouth once daily.      citalopram (CeleXA) 20 mg tablet TAKE 1 TABLET BY MOUTH EVERY DAY 90 tablet 3    losartan (Cozaar) 50 mg tablet TAKE 1 TABLET BY MOUTH EVERY DAY 90 tablet 3    mv-min-folic-K1-lycopen-lutein (Centrum Silver Men) 222--300 mcg tablet Take 1 tablet by mouth once daily.  Do not fill before October 13, 2024.      oxybutynin (Ditropan) 5 mg tablet Take 1 tablet (5 mg) by mouth once daily at bedtime. Take 1-3 tabs as needed before bedtime 90 tablet 3    polyethylene glycol (Glycolax, Miralax) 17 gram packet Take 17 g by mouth once daily as needed (constipation).      tamsulosin (Flomax) 0.4 mg 24 hr capsule Take 1 capsule (0.4 mg) by mouth once daily. 15 capsule 0     No current facility-administered medications for this visit.       PMH:  Past Medical History:   Diagnosis Date    Aortic stenosis     s/p TAVR 6/29/21    BRCA positive     Chronic pain disorder     Coronary artery disease     COVID-19 09/23/2022    Displaced fracture of right acetabulum     Graves disease     Hip fracture (Multi)     Hyperlipidemia     Hypertension     Hyperthyroidism     Myocardial infarction (Multi) 1999    acute plaque rupture in the LAD    Pelvic fracture (Multi)     Pulmonary nodule     stable    Stroke (Multi) 05/2016    hemorrhagic stroke with right hemiparesis in 2016       PSH:  Past Surgical History:   Procedure Laterality Date    ANKLE SURGERY      CARDIAC CATHETERIZATION      CARDIAC VALVE REPLACEMENT  06/29/2021    transcatheter aortic valve replacement    CORONARY ANGIOPLASTY WITH STENT PLACEMENT  10/06/2014    Cath Stent Placement    CT ANGIO NECK  07/18/2021    CT NECK ANGIO W AND WO IV CONTRAST 7/18/2021 [facility]ICAL LEGAC[facility]    CT HEAD ANGIO W AND WO IV CONTRAST  07/18/2021    CT HEAD ANGIO W AND WO IV CONTRAST 7/18/2021 [facility] CL[facility]GAC[facility]    FEMUR FRACTURE SURGERY  11/06/2014    Femur Repair       FMH:  No family history on file.    SHx:  Social History     Tobacco Use    Smoking status: Never    Smokeless tobacco: Never   Substance Use Topics    Alcohol use: Yes     Comment: one drink weekly    Drug use: Never       Allergies:  Allergies   Allergen Reactions    Penicillin Unknown       Assessment/Plan  Bothered by daytime urgency and urge incontinence. Nocturia improved on Oxyutynin.  I suggest switching him to Oxybutynin XL 15mg to get 24h effect    Red specs in urine, given his history of positive BRCA gene, I recommend repeating cystoscopy    FU with cysto in a month or so.     Scribe Attestation  By signing my name below, I, Brandt Rincon, attest that this documentation has been prepared under the direction and in the presence of Landon Augustin MD.

## 2025-04-03 ENCOUNTER — TELEMEDICINE (OUTPATIENT)
Dept: UROLOGY | Facility: HOSPITAL | Age: 70
End: 2025-04-03
Payer: MEDICARE

## 2025-04-03 DIAGNOSIS — N39.41 URGE INCONTINENCE: ICD-10-CM

## 2025-04-03 DIAGNOSIS — R35.1 BPH ASSOCIATED WITH NOCTURIA: ICD-10-CM

## 2025-04-03 DIAGNOSIS — N40.1 BPH ASSOCIATED WITH NOCTURIA: ICD-10-CM

## 2025-04-03 DIAGNOSIS — R31.0 GROSS HEMATURIA: Primary | ICD-10-CM

## 2025-04-03 PROCEDURE — G2211 COMPLEX E/M VISIT ADD ON: HCPCS | Performed by: UROLOGY

## 2025-04-03 PROCEDURE — 99214 OFFICE O/P EST MOD 30 MIN: CPT | Performed by: UROLOGY

## 2025-04-03 RX ORDER — OXYBUTYNIN CHLORIDE 15 MG/1
15 TABLET, EXTENDED RELEASE ORAL DAILY
Qty: 30 TABLET | Refills: 11 | Status: SHIPPED | OUTPATIENT
Start: 2025-04-03 | End: 2026-04-03

## 2025-04-07 ENCOUNTER — APPOINTMENT (OUTPATIENT)
Dept: PRIMARY CARE | Facility: CLINIC | Age: 70
End: 2025-04-07
Payer: MEDICARE

## 2025-04-07 VITALS
OXYGEN SATURATION: 98 % | DIASTOLIC BLOOD PRESSURE: 72 MMHG | SYSTOLIC BLOOD PRESSURE: 146 MMHG | BODY MASS INDEX: 20.86 KG/M2 | TEMPERATURE: 96.8 F | WEIGHT: 149 LBS | HEIGHT: 71 IN | HEART RATE: 50 BPM

## 2025-04-07 DIAGNOSIS — H91.90 HARD OF HEARING: ICD-10-CM

## 2025-04-07 DIAGNOSIS — Z95.2 S/P TAVR (TRANSCATHETER AORTIC VALVE REPLACEMENT): ICD-10-CM

## 2025-04-07 DIAGNOSIS — M16.51 POST-TRAUMATIC OSTEOARTHRITIS OF RIGHT HIP: ICD-10-CM

## 2025-04-07 DIAGNOSIS — G20.A1 PARKINSON'S DISEASE WITHOUT FLUCTUATING MANIFESTATIONS, UNSPECIFIED WHETHER DYSKINESIA PRESENT: Primary | ICD-10-CM

## 2025-04-07 DIAGNOSIS — I63.9 CEREBROVASCULAR ACCIDENT (CVA), UNSPECIFIED MECHANISM (MULTI): ICD-10-CM

## 2025-04-07 PROCEDURE — 1036F TOBACCO NON-USER: CPT | Performed by: STUDENT IN AN ORGANIZED HEALTH CARE EDUCATION/TRAINING PROGRAM

## 2025-04-07 PROCEDURE — 1125F AMNT PAIN NOTED PAIN PRSNT: CPT | Performed by: STUDENT IN AN ORGANIZED HEALTH CARE EDUCATION/TRAINING PROGRAM

## 2025-04-07 PROCEDURE — 3078F DIAST BP <80 MM HG: CPT | Performed by: STUDENT IN AN ORGANIZED HEALTH CARE EDUCATION/TRAINING PROGRAM

## 2025-04-07 PROCEDURE — G2211 COMPLEX E/M VISIT ADD ON: HCPCS | Performed by: STUDENT IN AN ORGANIZED HEALTH CARE EDUCATION/TRAINING PROGRAM

## 2025-04-07 PROCEDURE — 3077F SYST BP >= 140 MM HG: CPT | Performed by: STUDENT IN AN ORGANIZED HEALTH CARE EDUCATION/TRAINING PROGRAM

## 2025-04-07 PROCEDURE — 3008F BODY MASS INDEX DOCD: CPT | Performed by: STUDENT IN AN ORGANIZED HEALTH CARE EDUCATION/TRAINING PROGRAM

## 2025-04-07 PROCEDURE — 99215 OFFICE O/P EST HI 40 MIN: CPT | Performed by: STUDENT IN AN ORGANIZED HEALTH CARE EDUCATION/TRAINING PROGRAM

## 2025-04-07 RX ORDER — AZITHROMYCIN 500 MG/1
500 TABLET, FILM COATED ORAL DAILY PRN
Qty: 8 TABLET | Refills: 0 | Status: SHIPPED | OUTPATIENT
Start: 2025-04-07 | End: 2025-04-15

## 2025-04-07 ASSESSMENT — PAIN SCALES - GENERAL: PAINLEVEL_OUTOF10: 2

## 2025-04-07 NOTE — PATIENT INSTRUCTIONS
Please stop at any  lab ([address] if you choose to use my building) to complete your blood and/or urine work that I've ordered for you.    I will contact you with the results at my soonest convenience. I strongly urge you to use [employer] as this is the quickest and easiest way to access your results and receive my correspondences.     I am referring you to a neurologist for further evaluation of what I believe may be Parkinson's Disease. Please call [phone] if you do not hear from Radha Valiente, our scheduling .    I have referred you to audiology for formal hearing testing. Please call them at [phone] to arrange for the testing.    I have written for antibiotics for any upcoming dental procedures.     Further recommendations will be made based on test results and how you fare clinically.

## 2025-04-07 NOTE — PROGRESS NOTES
"Subjective   Patient ID: Nasir Camacho is a 70 y.o. male who presents for No chief complaint on file..    History of Present Illness  Nasir, a patient with a complex medical history, presents to [facility] care. He has a history of aortic stenosis for which he underwent a TAVR procedure, the details of which he does not fully recall. He also has a history of a STEMI of the LAD and a hemorrhagic stroke in 2016, which resulted in residual right-sided weakness. He reports being able to lift his right arm above his head and handle objects such as a gallon of milk, but notes general weakness on the right side.    In 2024, Naisr suffered a fall while changing a light bulb, resulting in a broken pelvis. Since then, he has been using a cane for mobility, primarily due to pain from the injury rather than weakness. He reports some difficulty walking after sitting for extended periods.    Nasir also reports urinary issues, including frequent urination and the presence of a \"gravelly\" substance in his urine. He is currently on medication to manage these symptoms.    He expresses concern about his memory, stating it is not as sharp as it used to be. He also mentions potential hearing loss and expresses interest in getting hearing aids.    Nasir's wife, Mey, notes that he sometimes chokes while eating, particularly when consuming cereal with milk. She also mentions that Nasir has osteopenia.      PMHx, FHx, Social Hx, Surg Hx personally reviewed at this appointment. No pertinent findings and/or changes from prior (if applicable).    ROS: Unless specified above, pt denies wt gain/loss f/c HA LoC CP SOB NVDC. See HPI above, and scanned sheet (if applicable). All other systems are reviewed and are without complaint.     Objective     /72   Pulse 50   Temp 36 °C (96.8 °F)   Ht 1.803 m (5' 11\")   Wt 67.6 kg (149 lb)   SpO2 98%   BMI 20.78 kg/m²      Physical Exam  Gen: elderly appearance. AAO x 3  HEENT: NC/AT. Anicteric " sclera, symmetric pupils.   Neck: Soft, supple. No LAD. No goiter.   CV: RRR nl s1s2 no m/r/g  Pulm: CTAB no w/r/r, good air exchange  GI: soft NTND BS+ no hsm  Ext: WWP no edema  Neuro: Mild R sided weakness. No tremor. Mild rigidity worse RUE.   MSK: 5/5 strength however RUE rigidity.   Gait: slow shuffling gait with cane        Lab Results   Component Value Date    WBC 9.9 11/07/2024    HGB 11.0 (L) 11/07/2024    HCT 34.1 (L) 11/07/2024     11/07/2024    CHOL 124 04/01/2024    TRIG 43 04/01/2024    HDL 60.8 04/01/2024    ALT 16 11/07/2024    AST 16 11/07/2024     11/07/2024    K 4.2 11/07/2024     11/07/2024    CREATININE 0.95 11/07/2024    BUN 23 11/07/2024    CO2 28 11/07/2024    TSH 1.92 04/01/2024    PSA 1.94 07/17/2024    INR 1.1 06/23/2024     par     Current Outpatient Medications   Medication Instructions    acetaminophen (TYLENOL) 650 mg, oral, Every 6 hours PRN    amLODIPine (Norvasc) 10 mg tablet 1/2 tablet or 5 mg every day    aspirin 81 mg chewable tablet 1 tablet, oral, Daily    atorvastatin (LIPITOR) 40 mg, oral, Daily    calcium carbonate-vitamin D3 (Oscal-500) 500 mg-10 mcg (400 unit) tablet 1 tablet, oral, 2 times daily    carvedilol (COREG) 3.125 mg, oral, Take with food.    cholecalciferol (Vitamin D-3) 25 MCG (1000 UT) tablet 1 tablet, oral, Daily    citalopram (CELEXA) 20 mg, oral, Daily    losartan (COZAAR) 50 mg, oral, Daily    mv-min-folic-K1-lycopen-lutein (Centrum Silver Men) 203--300 mcg tablet 1 tablet, oral, Daily    oxybutynin XL (DITROPAN-XL) 15 mg, oral, Daily, Do not crush, chew, or split.    polyethylene glycol (GLYCOLAX, MIRALAX) 17 g, oral, Daily PRN    tamsulosin (FLOMAX) 0.4 mg, oral, Daily        XR hip right with pelvis when performed 2 or 3 views, XR pelvis 1-2 views  Narrative: Interpreted By:  Fernando Reynolds,   STUDY:  XR PELVIS 1-2 VIEWS; XR HIP RIGHT WITH PELVIS WHEN PERFORMED 2 OR 3  VIEWS      INDICATION:  Signs/Symptoms:follow up;  Signs/Symptoms:s/p SURI.      COMPARISON:  October 14      ACCESSION NUMBER(S):  [id number]; [id number]      ORDERING CLINICIAN:  WINNIE DUMONT      FINDINGS:  Postsurgical changes of pelvic fracture fixation with right  acetabular screws across the posterior column. Hardware satisfactory.  The right hip arthroplasty satisfactory without suspicious lucency.      Impression: Satisfactory appearance status post right hip arthroplasty with  postsurgical changes of right acetabular fracture fixation.      Signed by: Fernando Reynolds 1/15/2025 5:17 PM  Dictation workstation:   [id number]           Assessment & Plan    # ? Parkinsons disease ? : shuffling gait, flat facies, rigidity, hypophonia  - referral to neurology      Right-sided weakness post-hemorrhagic stroke  Mild residual weakness post-2016 hemorrhagic stroke. Cane use primarily due to pelvic fracture.  - Continue current management for stroke-related symptoms.    Pelvic fracture  Fracture from September 2024 fall contributes to cane use. Reports soreness and ambulation difficulty.  - Continue using a cane as needed for ambulation.  - Follow up with orthopedic surgeon as needed.    Aortic stenosis status post-TAVR  Successful TAVR. Ongoing cardiology follow-up with Dr. Henry.  - Continue follow-up with cardiologist Dr. Henry.    Urinary incontinence and frequency  Improved symptoms under Dr. Augustin's care. Scheduled cystoscopy in May due to gravelly urine.  - Continue current urological management and medications.  - Undergo scheduled cystoscopy in May.    Osteopenia  Contributes to bone fragility and fracture risk. May have influenced pelvic fracture severity.  - Monitor bone health and consider interventions to improve bone density as needed.    Hearing loss  Reports difficulty hearing. Interested in hearing aids.  - Refer to an audiologist for hearing evaluation and potential hearing aid fitting.    Prophylactic antibiotics for dental procedures  Requires  non-penicillin antibiotic due to valve replacement and penicillin allergy.  - Prescribe a non-penicillin antibiotic for dental prophylaxis.          Antoine Medeiros MD       This medical note was created with the assistance of artificial intelligence (AI) for documentation purposes. The content has been reviewed and confirmed by the healthcare provider for accuracy and completeness. Patient consented to the use of audio recording and use of AI during their visit.

## 2025-04-10 LAB
ALBUMIN SERPL-MCNC: 4.7 G/DL (ref 3.6–5.1)
ALP SERPL-CCNC: 66 U/L (ref 35–144)
ALT SERPL-CCNC: 17 U/L (ref 9–46)
ANION GAP SERPL CALCULATED.4IONS-SCNC: 11 MMOL/L (CALC) (ref 7–17)
AST SERPL-CCNC: 19 U/L (ref 10–35)
BASOPHILS # BLD AUTO: 20 CELLS/UL (ref 0–200)
BASOPHILS NFR BLD AUTO: 0.4 %
BILIRUB SERPL-MCNC: 1.5 MG/DL (ref 0.2–1.2)
BUN SERPL-MCNC: 26 MG/DL (ref 7–25)
CALCIUM SERPL-MCNC: 9.9 MG/DL (ref 8.6–10.3)
CHLORIDE SERPL-SCNC: 104 MMOL/L (ref 98–110)
CO2 SERPL-SCNC: 27 MMOL/L (ref 20–32)
CREAT SERPL-MCNC: 1.01 MG/DL (ref 0.7–1.28)
EGFRCR SERPLBLD CKD-EPI 2021: 79 ML/MIN/1.73M2
EOSINOPHIL # BLD AUTO: 210 CELLS/UL (ref 15–500)
EOSINOPHIL NFR BLD AUTO: 4.2 %
ERYTHROCYTE [DISTWIDTH] IN BLOOD BY AUTOMATED COUNT: 12.9 % (ref 11–15)
GLUCOSE SERPL-MCNC: 97 MG/DL (ref 65–99)
HCT VFR BLD AUTO: 38.3 % (ref 38.5–50)
HGB BLD-MCNC: 12.8 G/DL (ref 13.2–17.1)
LYMPHOCYTES # BLD AUTO: 1640 CELLS/UL (ref 850–3900)
LYMPHOCYTES NFR BLD AUTO: 32.8 %
MCH RBC QN AUTO: 33.1 PG (ref 27–33)
MCHC RBC AUTO-ENTMCNC: 33.4 G/DL (ref 32–36)
MCV RBC AUTO: 99 FL (ref 80–100)
MONOCYTES # BLD AUTO: 415 CELLS/UL (ref 200–950)
MONOCYTES NFR BLD AUTO: 8.3 %
NEUTROPHILS # BLD AUTO: 2715 CELLS/UL (ref 1500–7800)
NEUTROPHILS NFR BLD AUTO: 54.3 %
PLATELET # BLD AUTO: 146 THOUSAND/UL (ref 140–400)
PMV BLD REES-ECKER: 9.8 FL (ref 7.5–12.5)
POTASSIUM SERPL-SCNC: 5 MMOL/L (ref 3.5–5.3)
PROT SERPL-MCNC: 7 G/DL (ref 6.1–8.1)
RBC # BLD AUTO: 3.87 MILLION/UL (ref 4.2–5.8)
SODIUM SERPL-SCNC: 142 MMOL/L (ref 135–146)
WBC # BLD AUTO: 5 THOUSAND/UL (ref 3.8–10.8)

## 2025-04-14 ENCOUNTER — OFFICE VISIT (OUTPATIENT)
Dept: ORTHOPEDIC SURGERY | Facility: CLINIC | Age: 70
End: 2025-04-14
Payer: MEDICARE

## 2025-04-14 ENCOUNTER — HOSPITAL ENCOUNTER (OUTPATIENT)
Dept: RADIOLOGY | Facility: CLINIC | Age: 70
Discharge: HOME | End: 2025-04-14
Payer: MEDICARE

## 2025-04-14 DIAGNOSIS — Z96.641 AFTERCARE FOLLOWING RIGHT HIP JOINT REPLACEMENT SURGERY: ICD-10-CM

## 2025-04-14 DIAGNOSIS — R29.898 SEVERE MUSCLE DECONDITIONING: ICD-10-CM

## 2025-04-14 DIAGNOSIS — S32.461A CLOSED DISPLACED COMBINED TRANSVERSE-POSTERIOR FRACTURE OF RIGHT ACETABULUM, INITIAL ENCOUNTER (MULTI): Primary | ICD-10-CM

## 2025-04-14 DIAGNOSIS — Z96.641 STATUS POST RIGHT HIP REPLACEMENT: ICD-10-CM

## 2025-04-14 DIAGNOSIS — Z47.1 AFTERCARE FOLLOWING RIGHT HIP JOINT REPLACEMENT SURGERY: ICD-10-CM

## 2025-04-14 PROCEDURE — G2211 COMPLEX E/M VISIT ADD ON: HCPCS | Performed by: ORTHOPAEDIC SURGERY

## 2025-04-14 PROCEDURE — 73502 X-RAY EXAM HIP UNI 2-3 VIEWS: CPT | Mod: RIGHT SIDE | Performed by: RADIOLOGY

## 2025-04-14 PROCEDURE — 99214 OFFICE O/P EST MOD 30 MIN: CPT | Performed by: ORTHOPAEDIC SURGERY

## 2025-04-14 PROCEDURE — 73502 X-RAY EXAM HIP UNI 2-3 VIEWS: CPT | Mod: RT

## 2025-04-14 NOTE — PROGRESS NOTES
Subjective    Patient ID: Nasir Camacho is a 70 y.o. male.         Chief Complaint:  Post-op Visit (S/p DA SURI and pinning of acetabulum )     Last Surgery: Arthroplasty Total Hip Anterior Approach - Right, Removal Hardware Pelvis - Right, and Pelvic Fracture Percutaneous Fixation - Right  Last Surgery Date: 9/25/2024    HPI  Patient is a 70 y.o. male who is s/p Right Direct Anterior Total hip Conversion with right pelvic percutaneous fixation. Date of surgery was 9/25/2024.  Patient Has been doing well. He reports his pain has significantly  improved but rates his pain as mild.   He is able to ambulate using a cane now instead of using the walker.  Patient reports that he was discharged from outpatient physical therapy and has been e been working as a .  He rates his pain as mild at this point.  He feels like he is still deconditioned and has weakness in the leg.  Overall he feels very happy with his progress.  No wound healing problem.  Of note patient have history of stroke with right-sided weakness.  Patient also mentions he is primary care physician is planning to perform walk up to rule out Parkinson disease.    ROS: All other systems have been reviewed and are negative except as previously noted in history of present illness.      IMP:  Problem List Items Addressed This Visit    None  Visit Diagnoses       Status post right hip replacement        Relevant Orders    XR hip right with pelvis when performed 2 or 3 views          Objective   General: Alert and oriented x 3, NAD, respirations easy and unlabored with no audible wheezes, skin warm and dry, speech and dress appropriate for noted age, affect euthymic.     Musculoskeletal: Right Lower Extremity  incisions c/d/i  No swelling to lower leg  compartments soft  no calf tenderness  sensation intact to light touch  motor intact including TA/GS/EHL  palpable DP/PT pulses 2+   No pain rotation range of motion of the hip.    RIGHT HIP:  No pain  with rotational range of motion.  External rotation of 45  degrees, Internal rotation of 30 degrees  Flexion 120 degrees.     X-ray: Images of pelvis reviewed personally by me today and reveal maintenance of alignment of acetabulum fracture with hardware in position and no interval change.  There is interval callus formation and healing of the acetabular fracture.  The hip prosthesis is stable.  No lucency.    Assessment/Plan   Encounter Diagnoses:  Status post right hip replacement    PLAN: Patient is s/p Right Direct Anterior Total hip Conversion with right pelvic percutaneous fixation.  His fracture is healing well.  His hip prosthesis is stable.  Patient has functional improvement and his pain is mild at this point.  At this time patient will continue activities as tolerated without any restrictions. We discussed focusing on physical therapy range of motion and strengthening exercises.  Patient was pretty deconditioned with prolonged immobilization after his acetabular fracture and in the setting of his hx of stroke we discussed with him that this will impact  his recovery.  However he is making steady progress.  He will use assistive device as tolerated.  He may return back to driving whenever he feels ready.  No restrictions at this time.  We discussed routine care for hip replacement surgery.  I will see him back in 6 months obtain x-ray of the right hip at that time.    Orders Placed This Encounter    XR hip right with pelvis when performed 2 or 3 views     No follow-ups on file.    Scribe Attestation  By signing my name below, IAura Scribe attest that this documentation has been prepared under the direction and in the presence of Rico Kerr MD. All medical record entries made by the Scribe were at my direction or personally dictated by me. I have reviewed the chart and agree that the record accurately reflects my personal performance of the history, physical exam, discussion and plan.

## 2025-04-17 ENCOUNTER — CLINICAL SUPPORT (OUTPATIENT)
Dept: AUDIOLOGY | Facility: CLINIC | Age: 70
End: 2025-04-17
Payer: MEDICARE

## 2025-04-17 DIAGNOSIS — H90.3 BILATERAL SENSORINEURAL HEARING LOSS: Primary | ICD-10-CM

## 2025-04-17 PROCEDURE — 92557 COMPREHENSIVE HEARING TEST: CPT

## 2025-04-17 PROCEDURE — 92567 TYMPANOMETRY: CPT

## 2025-04-17 ASSESSMENT — PAIN - FUNCTIONAL ASSESSMENT: PAIN_FUNCTIONAL_ASSESSMENT: 0-10

## 2025-04-17 ASSESSMENT — PAIN SCALES - GENERAL: PAINLEVEL_OUTOF10: 0 - NO PAIN

## 2025-04-17 NOTE — PROGRESS NOTES
AUDIOLOGIC EVALUATION      Name:  Nasir Camacho  :  1955  Age:  70 y.o.  Date of Evaluation:  2025    Time: 830-930    HISTORY:  Nasir Camacho, 70 y.o., was seen for an audiologic assessment. He reported a gradual decline in hearing sensitivity. He noted that he would like to establish a baseline for his hearing sensitivity. He had one fall in the last year that resulted in an injured pelvis and hip. He denied otalgia, otorrhea, tinnitus, dizziness, aural pressure/fullness, history of noise exposure, history of otologic surgeries, and family history of hearing loss.      RESULTS:  Otoscopic Evaluation:  Right Ear: Non-occluding cerumen  Left Ear: Non-occluding cerumen    Cerumen removal was attempted in office via lighted curette. The left ear was not able to be cleared entirely.     Immittance Measures:  Right Ear: Type Ad -- indicating normal volume, pressure, and hyper tympanic membrane compliance  Left Ear: Type Ad -- indicating normal volume, pressure, and hyper tympanic membrane compliance    Acoustic Reflexes:  Right Ear: Could not test due to type Ad tymp.  Left Ear: Could not test due to type Ad tymp.    Pure Tone Audiometry:    Right Ear: Hearing within normal limits 125-2000 Hz sloping to a mild sensorineural hearing loss at 8000 Hz    Left Ear: Hearing within normal limits 125-500 Hz sloping to a moderate sensorineural hearing loss at 8000 Hz    Asymmetry: No    Reliability:   Good    Speech Audiometry:   Right: Speech Reception Threshold (SRT) was obtained at 25 dBHL.   SRT/PTA in Good agreement with pure tone average.    Left: Speech Reception Threshold (SRT) was obtained at 25 dBHL.   SRT/PTA in Good agreement with pure tone average.    Word Recognition Scores (WRS):  Right Ear: excellent (96%) in quiet when words were presented at 65 dBHL w/ masking.   Left Ear: excellent (100%) in quiet when words were presented at 65 dBHL w/ masking.     Asymmetry: No    Quick Speech in Noise  (QuickSIN):  Right Ear: Unaided sound field signal-to-noise ratio (SNR) loss of 13.5 which correlates to a moderate SNR loss (7-15 dB), directional microphones help; consider array shelia.  Left Ear: Unaided sound field SNR loss of 5.5 which correlates to a mild SNR loss (3-7 dB), patient may hear almost as well as those with normal hearing are able to hear in noise.  Binaural: Unaided sound field SNR loss of 9.5 which correlates to a moderate SNR loss (7-15 dB), directional microphones help; consider array shelia.      IMPRESSIONS:  Today's testing revealed normal ear canal volume, middle ear pressure, and hyper compliant tympanic membranes in both ears. He has hearing within normal limits sloping to a mild sensorineural hearing loss in the right ear and sloping to a moderate sensorineural hearing loss in the left ear. He has excellent word recognition in both ears. He may benefit from hearing aids based on his QuickSIN scores. The results were discussed with the patient. Pros and cons of hearing aids were reviewed. He does not wish to pursue hearing aids at this time.        RECOMMENDATIONS:  1.) Return for audiologic evaluation  annually  to monitor hearing sensitivity and assess middle ear status or sooner should concerns arise. The audiology department can be reached at [phone] to schedule an appointment.       Melissa Pope, CCC-A  Clinical Audiologist      KEY  SNHL Sensorineural Hearing Loss   CHL Conductive Hearing Loss   MHL Mixed Hearing Loss   SSNHL Sudden Sensorineural Hearing Loss   WNL Within Normal Limits   PTA Pure Tone Average   TM Tympanic Membrane   ECV Ear Canal Volume   SRT Speech Reception Threshold   WRS Word Recognition Score

## 2025-05-07 ENCOUNTER — PROCEDURE VISIT (OUTPATIENT)
Dept: UROLOGY | Facility: HOSPITAL | Age: 70
End: 2025-05-07
Payer: MEDICARE

## 2025-05-07 VITALS — HEART RATE: 55 BPM | DIASTOLIC BLOOD PRESSURE: 56 MMHG | SYSTOLIC BLOOD PRESSURE: 130 MMHG

## 2025-05-07 DIAGNOSIS — R31.0 GROSS HEMATURIA: ICD-10-CM

## 2025-05-07 DIAGNOSIS — N20.0 KIDNEY STONES: ICD-10-CM

## 2025-05-07 PROCEDURE — 99214 OFFICE O/P EST MOD 30 MIN: CPT | Mod: 25 | Performed by: UROLOGY

## 2025-05-07 PROCEDURE — 52000 CYSTOURETHROSCOPY: CPT | Performed by: UROLOGY

## 2025-05-07 PROCEDURE — 99214 OFFICE O/P EST MOD 30 MIN: CPT | Performed by: UROLOGY

## 2025-05-07 NOTE — PROGRESS NOTES
HPI    70 y.o. male being seen with the following problem list:    Problem list:  Hip fracture 6/2024  Bothersome urinary frequency     07/17/24 -seen in consultation.  At baseline has some bothersome urinary frequency, but since his pelvic fracture is gotten much worse.  Is now getting somewhat better.  Started on Flomax but this did not help.  PVR today 64 ml.  No gross hematuria.  He reports no gross hematuria around the time of his pelvic fracture.  No UTIs.  Is BRCA positive.  No history of prostate cancer.     09/11/24 - here for cysto. OAB symptoms are better on vesicare, but not where he wants to be.      01/15/25 - Patient is now s/p right hip removal of hardware and right total hip arthroplasty, ORIF R acetabulum on 9/25 by Dr. Kerr. Feels like his urinary symptom have improved, frequency improved 4-5x during the day, bothered by nocturia 3-4x. Advise to stop Vesicare, will order Oxybutynin IR 5mg, he can take 1-3 pills qhs, will slowly increase.      04/03/25 - Seen today over telehealth, performed this visit using real-time telehealth tools, including an audio/video connection between Nasir Camacho at home and Landon Augustin MD at [facility]. Consent for telemedicine visit was obtained. Has been having daytime urgency and urge incontinence, not new. Nocturia down to 2x on Oxybutynin IR. Reports Red specs in urine.    CT A/P w contrast 11/07/24  IMPRESSION:  A 3-4 mm stone is in the urinary bladder, new from CT 10 September  2024. I suspect it has very recently passed from the right side  There is mild right hydroureteronephrosis and a delayed right  nephrogram with a 3 mm or smaller, too small to measure attenuation  stone in the distal right ureter within 5-7 cm of the UVJ. This  distal right ureteral stone is sitting dependently in the dilated  distal right ureter. It may be that the stone that is already in the  urinary bladder was the cause of the hydroureteronephrosis given that  the  stone in the distal right ureter does not appear lodged but  rather sitting dependently  No other acute findings  No urine leak/urinoma  No left hydroureteronephrosis  No separate acute process away from the right side of the upper  tract. The appendix is normal    05/07/25 - the patient is taking oxybutynin XL 15 mg daily. The patient has urinary urgency and frequency.       Lab Results   Component Value Date    PSA 1.94 07/17/2024    PSA 1.91 04/01/2024    PSA 3.87 03/29/2021    PSA 1.32 06/25/2018         Current Medications:  Current Medications[1]     Active Problems:  Nasir Camacho is a 70 y.o. male with the following Problems and Medications.  Problem List[2]  Current Medications[3]    PMH:  Medical History[4]    PSH:  Surgical History[5]    FMH:  Family History[6]    SHx:  Social History[7]    Allergies:  RX Allergies[8]    Procedure:  After informed consent was obtained, the patient was taken to the procedure room for cystoscopy due to red specs in urine and positive BRCA.     Cystoscopy     Procedure Note:    A sterile prep and drape was performed in standard fashion. Lidocaine was used for topical anesthesia. A flexible cystoscope was inserted into the urethra without difficulty revealing normal urethra.     Findings: bilobar obstruction, the bladder is heavily trabeculated with numerous small stones in the bottom of the bladder and significant circumferential intravesical protrusion of prostate.    Then entered the bladder revealing bladder mucosa with no erythematous patches or plaques, foreign bodies, stones or papillary lesions. The ureteral orifices were visualized bilaterally. These were orthotopic in location and effluxing clear urine. No masses were seen on retroflexion.     Post-Procedure:   The cystoscope was removed. The vital signs were stable . The patient tolerated the procedure well. There were no complications.     Assessment/Plan  The patient has both obstructive and irritative symptoms.  Bladder changes due to retention. The patient is doing well on oxybutynin happy where he is at. CT from last year finds hydronephrosis and obstruction stone. Did not have follow up for this.  Order CT Urogram. Follow-up in 1 months with results.       Scribe Attestation  By signing my name below, I, Diann Jinny , Brandt   attest that this documentation has been prepared under the direction and in the presence of Landon Augustin MD.         [1]   Current Outpatient Medications   Medication Sig Dispense Refill    acetaminophen (Tylenol) 325 mg tablet Take 2 tablets (650 mg) by mouth every 6 hours if needed for mild pain (1 - 3). 60 tablet 3    amLODIPine (Norvasc) 10 mg tablet 1/2 tablet or 5 mg every day (Patient taking differently: Patient is currently taking #1 tablet daily) 90 tablet 3    aspirin 81 mg chewable tablet Chew 1 tablet (81 mg) once daily.      atorvastatin (Lipitor) 40 mg tablet Take 1 tablet (40 mg) by mouth once daily. 90 tablet 3    calcium carbonate-vitamin D3 (Oscal-500) 500 mg-10 mcg (400 unit) tablet Take 1 tablet by mouth 2 times a day. 60 tablet 3    carvedilol (Coreg) 3.125 mg tablet TAKE 1 TABLET BY MOUTH TWICE A DAY WITH FOOD 180 tablet 3    cholecalciferol (Vitamin D-3) 25 MCG (1000 UT) tablet Take 1 tablet (25 mcg) by mouth once daily.      citalopram (CeleXA) 20 mg tablet TAKE 1 TABLET BY MOUTH EVERY DAY 90 tablet 3    losartan (Cozaar) 50 mg tablet TAKE 1 TABLET BY MOUTH EVERY DAY 90 tablet 3    mv-min-folic-K1-lycopen-lutein (Centrum Silver Men) 247--300 mcg tablet Take 1 tablet by mouth once daily. Do not fill before October 13, 2024.      oxybutynin XL (Ditropan-XL) 15 mg 24 hr tablet Take 1 tablet (15 mg) by mouth once daily. Do not crush, chew, or split. 30 tablet 11    polyethylene glycol (Glycolax, Miralax) 17 gram packet Take 17 g by mouth once daily as needed (constipation).      tamsulosin (Flomax) 0.4 mg 24 hr capsule Take 1 capsule (0.4 mg) by mouth once daily. 15  capsule 0     No current facility-administered medications for this visit.   [2]   Patient Active Problem List  Diagnosis    Subacromial bursitis    Stroke (Multi)    Sprain of shoulder    Skin changes due to chronic exposure to nonionizing radiation, unspecified    Sensation of foreign body in eye    S/P TAVR (transcatheter aortic valve replacement)    Pulmonary nodule    Prostate disorder    Pain of right lower extremity    Other seborrheic keratosis    Inflamed seborrheic keratosis    Neuropathic pain of right lower extremity    Nephrolithiasis    Myopia with presbyopia    Myopia of both eyes    Melanocytic nevi of other parts of face    Laceration of right thumb    Hyperthyroidism    Hypercholesterolemia    Hordeolum externum of right upper eyelid    History of non-ST elevation myocardial infarction (NSTEMI)    Hemiparesis affecting dominant side as late effect of stroke (Multi)    Hematuria    Hemangioma of skin and subcutaneous tissue    Headache    Essential hypertension    Elevated PSA    Depression    MGD (meibomian gland disease)    Arteriosclerotic cardiovascular disease (ASCVD)    Accelerated hypertension    Abdominal pain    ICH (intracerebral hemorrhage) (Multi)    Fall, initial encounter    Fracture of anterior wall of acetabulum    Urinary frequency    Screening PSA (prostate specific antigen)    Closed displaced associated transverse-posterior fracture of right acetabulum    Post-traumatic osteoarthritis of right hip    Presence of retained hardware    Benign prostatic hyperplasia (BPH) with urinary urgency    Hip osteoarthritis    Acute right hip pain   [3]   Current Outpatient Medications   Medication Sig Dispense Refill    acetaminophen (Tylenol) 325 mg tablet Take 2 tablets (650 mg) by mouth every 6 hours if needed for mild pain (1 - 3). 60 tablet 3    amLODIPine (Norvasc) 10 mg tablet 1/2 tablet or 5 mg every day (Patient taking differently: Patient is currently taking #1 tablet daily) 90  tablet 3    aspirin 81 mg chewable tablet Chew 1 tablet (81 mg) once daily.      atorvastatin (Lipitor) 40 mg tablet Take 1 tablet (40 mg) by mouth once daily. 90 tablet 3    calcium carbonate-vitamin D3 (Oscal-500) 500 mg-10 mcg (400 unit) tablet Take 1 tablet by mouth 2 times a day. 60 tablet 3    carvedilol (Coreg) 3.125 mg tablet TAKE 1 TABLET BY MOUTH TWICE A DAY WITH FOOD 180 tablet 3    cholecalciferol (Vitamin D-3) 25 MCG (1000 UT) tablet Take 1 tablet (25 mcg) by mouth once daily.      citalopram (CeleXA) 20 mg tablet TAKE 1 TABLET BY MOUTH EVERY DAY 90 tablet 3    losartan (Cozaar) 50 mg tablet TAKE 1 TABLET BY MOUTH EVERY DAY 90 tablet 3    mv-min-folic-K1-lycopen-lutein (Centrum Silver Men) 758--300 mcg tablet Take 1 tablet by mouth once daily. Do not fill before October 13, 2024.      oxybutynin XL (Ditropan-XL) 15 mg 24 hr tablet Take 1 tablet (15 mg) by mouth once daily. Do not crush, chew, or split. 30 tablet 11    polyethylene glycol (Glycolax, Miralax) 17 gram packet Take 17 g by mouth once daily as needed (constipation).      tamsulosin (Flomax) 0.4 mg 24 hr capsule Take 1 capsule (0.4 mg) by mouth once daily. 15 capsule 0     No current facility-administered medications for this visit.   [4]   Past Medical History:  Diagnosis Date    Aortic stenosis     s/p TAVR 6/29/21    BRCA positive     Chronic pain disorder     Coronary artery disease     COVID-19 09/23/2022    Displaced fracture of right acetabulum     Graves disease     Hip fracture (Multi)     Hyperlipidemia     Hypertension     Hyperthyroidism     Myocardial infarction (Multi) 1999    acute plaque rupture in the LAD    Pelvic fracture (Multi)     Pulmonary nodule     stable    Stroke (Multi) 05/2016    hemorrhagic stroke with right hemiparesis in 2016   [5]   Past Surgical History:  Procedure Laterality Date    ANKLE SURGERY      CARDIAC CATHETERIZATION      CARDIAC VALVE REPLACEMENT  06/29/2021    transcatheter aortic valve  replacement    CORONARY ANGIOPLASTY WITH STENT PLACEMENT  10/06/2014    Cath Stent Placement    CT ANGIO NECK  07/18/2021    CT NECK ANGIO W AND WO IV CONTRAST 7/18/2021 [facility]    CT HEAD ANGIO W AND WO IV CONTRAST  07/18/2021    CT HEAD ANGIO W AND WO IV CONTRAST 7/18/2021 [facility]    FEMUR FRACTURE SURGERY  11/06/2014    Femur Repair   [6] No family history on file.  [7]   Social History  Tobacco Use    Smoking status: Never    Smokeless tobacco: Never   Substance Use Topics    Alcohol use: Yes     Comment: one drink weekly    Drug use: Never   [8]   Allergies  Allergen Reactions    Penicillin Unknown

## 2025-05-16 ENCOUNTER — HOSPITAL ENCOUNTER (OUTPATIENT)
Dept: RADIOLOGY | Facility: HOSPITAL | Age: 70
Discharge: HOME | End: 2025-05-16
Payer: MEDICARE

## 2025-05-16 DIAGNOSIS — N20.0 KIDNEY STONES: ICD-10-CM

## 2025-05-16 PROCEDURE — 76377 3D RENDER W/INTRP POSTPROCES: CPT

## 2025-05-16 PROCEDURE — 2550000001 HC RX 255 CONTRASTS: Performed by: UROLOGY

## 2025-05-16 RX ADMIN — IOHEXOL 75 ML: 350 INJECTION, SOLUTION INTRAVENOUS at 13:47

## 2025-06-02 NOTE — PROGRESS NOTES
HPI    70 y.o. male being seen with the following problem list:    Problem list:  Hip fracture 6/2024  Bothersome urinary frequency     07/17/24 -seen in consultation.  At baseline has some bothersome urinary frequency, but since his pelvic fracture is gotten much worse.  Is now getting somewhat better.  Started on Flomax but this did not help.  PVR today 64 ml.  No gross hematuria.  He reports no gross hematuria around the time of his pelvic fracture.  No UTIs.  Is BRCA positive.  No history of prostate cancer.     09/11/24 - here for cysto. OAB symptoms are better on vesicare, but not where he wants to be.      01/15/25 - Patient is now s/p right hip removal of hardware and right total hip arthroplasty, ORIF R acetabulum on 9/25 by Dr. Kerr. Feels like his urinary symptom have improved, frequency improved 4-5x during the day, bothered by nocturia 3-4x. Advise to stop Vesicare, will order Oxybutynin IR 5mg, he can take 1-3 pills qhs, will slowly increase.      04/03/25 - Seen today over telehealth, performed this visit using real-time telehealth tools, including an audio/video connection between Nasir Camacho at home and Landon Augustin MD at [facility]. Consent for telemedicine visit was obtained. Has been having daytime urgency and urge incontinence, not new. Nocturia down to 2x on Oxybutynin IR. Reports Red specs in urine.     CT A/P w contrast 11/07/24  IMPRESSION:  A 3-4 mm stone is in the urinary bladder, new from CT 10 September  2024. I suspect it has very recently passed from the right side  There is mild right hydroureteronephrosis and a delayed right  nephrogram with a 3 mm or smaller, too small to measure attenuation  stone in the distal right ureter within 5-7 cm of the UVJ. This  distal right ureteral stone is sitting dependently in the dilated  distal right ureter. It may be that the stone that is already in the  urinary bladder was the cause of the hydroureteronephrosis given that  the  stone in the distal right ureter does not appear lodged but  rather sitting dependently  No other acute findings  No urine leak/urinoma  No left hydroureteronephrosis  No separate acute process away from the right side of the upper tract.      05/07/25 - the patient is taking oxybutynin XL 15 mg daily. The patient has urinary urgency and frequency. Cysto today shows bilobar obstruction, the bladder is heavily trabeculated with numerous small stones in the bottom of the bladder and significant circumferential intravesical protrusion of prostate.     5/16/25 CTU   1.  Nonobstructing renal calculi are again visualized in the bilateral kidneys measuring up to 0.7 cm in the left inferior renal pole and 0.6 cm in the right inferior renal pole. No evidence of obstructing renal calculi or hydroureteronephrosis bilaterally.  2. Circumferential urinary bladder wall thickening and trabeculation with a few urinary bladder wall diverticuli c/w obstruction  3. No abnormal intraluminal filling defects within the contrast opacified portions of the bilateral pelvocaliceal system, bilateral ureters and urinary bladder on delayed phase imaging.    06/04/25 - Seen today over telehealth, performed this visit using real-time telehealth tools, including an audio/video connection between Nasir Camacho at home and Landon Augustin MD at [facility]. Consent for telemedicine visit was obtained.   Symptoms improved, nocturia down to 1x, some urge incontinence but much improved. On Oxybutynin XL 15mg daily, has some s/e like dry mouth but manageable.       Lab Results   Component Value Date    PSA 1.94 07/17/2024    PSA 1.91 04/01/2024    PSA 3.87 03/29/2021    PSA 1.32 06/25/2018              Current Medications:  Current Medications[1]     Active Problems:  Nasir Camacho is a 70 y.o. male with the following Problems and Medications.  Problem List[2]  Current Medications[3]    PMH:  Medical History[4]    PSH:  Surgical  History[5]    FMH:  Family History[6]    SHx:  Social History[7]    Allergies:  RX Allergies[8]      Assessment/Plan  Symptoms somewhat better on Oxybutynin XL 15mg, has dry mouth, though manageable. Discussed options including continuing current regime vs surgery+Botox vs different medication. He would like to stay on the same medication but try a lower dose, will order Oxybutynin 10mg for him.     Reviewed his CT showing passage of stone, no other concerns.    FU in 6-8 weeks over TH.    Kathryne Attestation  By signing my name below, I, Daysi BrownBrandt, attest that this documentation has been prepared under the direction and in the presence of Landon Augustin MD.           [1]   Current Outpatient Medications   Medication Sig Dispense Refill    acetaminophen (Tylenol) 325 mg tablet Take 2 tablets (650 mg) by mouth every 6 hours if needed for mild pain (1 - 3). 60 tablet 3    amLODIPine (Norvasc) 10 mg tablet 1/2 tablet or 5 mg every day (Patient taking differently: Patient is currently taking #1 tablet daily) 90 tablet 3    aspirin 81 mg chewable tablet Chew 1 tablet (81 mg) once daily.      atorvastatin (Lipitor) 40 mg tablet Take 1 tablet (40 mg) by mouth once daily. 90 tablet 3    calcium carbonate-vitamin D3 (Oscal-500) 500 mg-10 mcg (400 unit) tablet Take 1 tablet by mouth 2 times a day. 60 tablet 3    carvedilol (Coreg) 3.125 mg tablet TAKE 1 TABLET BY MOUTH TWICE A DAY WITH FOOD 180 tablet 3    cholecalciferol (Vitamin D-3) 25 MCG (1000 UT) tablet Take 1 tablet (25 mcg) by mouth once daily.      citalopram (CeleXA) 20 mg tablet TAKE 1 TABLET BY MOUTH EVERY DAY 90 tablet 3    losartan (Cozaar) 50 mg tablet TAKE 1 TABLET BY MOUTH EVERY DAY 90 tablet 3    mv-min-folic-K1-lycopen-lutein (Centrum Silver Men) 766--300 mcg tablet Take 1 tablet by mouth once daily. Do not fill before October 13, 2024.      polyethylene glycol (Glycolax, Miralax) 17 gram packet Take 17 g by mouth once daily as needed  (constipation).      tamsulosin (Flomax) 0.4 mg 24 hr capsule Take 1 capsule (0.4 mg) by mouth once daily. 15 capsule 0     No current facility-administered medications for this visit.   [2]   Patient Active Problem List  Diagnosis    Subacromial bursitis    Stroke (Multi)    Sprain of shoulder    Skin changes due to chronic exposure to nonionizing radiation, unspecified    Sensation of foreign body in eye    S/P TAVR (transcatheter aortic valve replacement)    Pulmonary nodule    Prostate disorder    Pain of right lower extremity    Other seborrheic keratosis    Inflamed seborrheic keratosis    Neuropathic pain of right lower extremity    Nephrolithiasis    Myopia with presbyopia    Myopia of both eyes    Melanocytic nevi of other parts of face    Laceration of right thumb    Hyperthyroidism    Hypercholesterolemia    Hordeolum externum of right upper eyelid    History of non-ST elevation myocardial infarction (NSTEMI)    Hemiparesis affecting dominant side as late effect of stroke (Multi)    Hematuria    Hemangioma of skin and subcutaneous tissue    Headache    Essential hypertension    Elevated PSA    Depression    MGD (meibomian gland disease)    Arteriosclerotic cardiovascular disease (ASCVD)    Accelerated hypertension    Abdominal pain    ICH (intracerebral hemorrhage) (Multi)    Fall, initial encounter    Fracture of anterior wall of acetabulum    Urinary frequency    Screening PSA (prostate specific antigen)    Closed displaced associated transverse-posterior fracture of right acetabulum    Post-traumatic osteoarthritis of right hip    Presence of retained hardware    Benign prostatic hyperplasia (BPH) with urinary urgency    Hip osteoarthritis    Acute right hip pain   [3]   Current Outpatient Medications   Medication Sig Dispense Refill    acetaminophen (Tylenol) 325 mg tablet Take 2 tablets (650 mg) by mouth every 6 hours if needed for mild pain (1 - 3). 60 tablet 3    amLODIPine (Norvasc) 10 mg tablet  1/2 tablet or 5 mg every day (Patient taking differently: Patient is currently taking #1 tablet daily) 90 tablet 3    aspirin 81 mg chewable tablet Chew 1 tablet (81 mg) once daily.      atorvastatin (Lipitor) 40 mg tablet Take 1 tablet (40 mg) by mouth once daily. 90 tablet 3    calcium carbonate-vitamin D3 (Oscal-500) 500 mg-10 mcg (400 unit) tablet Take 1 tablet by mouth 2 times a day. 60 tablet 3    carvedilol (Coreg) 3.125 mg tablet TAKE 1 TABLET BY MOUTH TWICE A DAY WITH FOOD 180 tablet 3    cholecalciferol (Vitamin D-3) 25 MCG (1000 UT) tablet Take 1 tablet (25 mcg) by mouth once daily.      citalopram (CeleXA) 20 mg tablet TAKE 1 TABLET BY MOUTH EVERY DAY 90 tablet 3    losartan (Cozaar) 50 mg tablet TAKE 1 TABLET BY MOUTH EVERY DAY 90 tablet 3    mv-min-folic-K1-lycopen-lutein (Centrum Silver Men) 081--300 mcg tablet Take 1 tablet by mouth once daily. Do not fill before October 13, 2024.      polyethylene glycol (Glycolax, Miralax) 17 gram packet Take 17 g by mouth once daily as needed (constipation).      tamsulosin (Flomax) 0.4 mg 24 hr capsule Take 1 capsule (0.4 mg) by mouth once daily. 15 capsule 0     No current facility-administered medications for this visit.   [4]   Past Medical History:  Diagnosis Date    Aortic stenosis     s/p TAVR 6/29/21    BRCA positive     Chronic pain disorder     Coronary artery disease     COVID-19 09/23/2022    Displaced fracture of right acetabulum     Graves disease     Hip fracture (Multi)     Hyperlipidemia     Hypertension     Hyperthyroidism     Myocardial infarction (Multi) 1999    acute plaque rupture in the LAD    Pelvic fracture (Multi)     Pulmonary nodule     stable    Stroke (Multi) 05/2016    hemorrhagic stroke with right hemiparesis in 2016   [5]   Past Surgical History:  Procedure Laterality Date    ANKLE SURGERY      CARDIAC CATHETERIZATION      CARDIAC VALVE REPLACEMENT  06/29/2021    transcatheter aortic valve replacement    CORONARY ANGIOPLASTY  WITH STENT PLACEMENT  10/06/2014    Cath Stent Placement    CT ANGIO NECK  07/18/2021    CT NECK ANGIO W AND WO IV CONTRAST 7/18/2021 [facility]    CT HEAD ANGIO W AND WO IV CONTRAST  07/18/2021    CT HEAD ANGIO W AND WO IV CONTRAST 7/18/2021 [facility]    FEMUR FRACTURE SURGERY  11/06/2014    Femur Repair   [6] No family history on file.  [7]   Social History  Tobacco Use    Smoking status: Never    Smokeless tobacco: Never   Substance Use Topics    Alcohol use: Yes     Comment: one drink weekly    Drug use: Never   [8]   Allergies  Allergen Reactions    Penicillin Unknown

## 2025-06-04 ENCOUNTER — TELEMEDICINE (OUTPATIENT)
Dept: UROLOGY | Facility: HOSPITAL | Age: 70
End: 2025-06-04
Payer: MEDICARE

## 2025-06-04 DIAGNOSIS — N20.0 KIDNEY STONES: ICD-10-CM

## 2025-06-04 DIAGNOSIS — R35.0 URINARY FREQUENCY: ICD-10-CM

## 2025-06-04 DIAGNOSIS — N39.41 URGE INCONTINENCE: Primary | ICD-10-CM

## 2025-06-04 PROCEDURE — 99214 OFFICE O/P EST MOD 30 MIN: CPT | Performed by: UROLOGY

## 2025-06-04 PROCEDURE — G2211 COMPLEX E/M VISIT ADD ON: HCPCS | Performed by: UROLOGY

## 2025-06-04 RX ORDER — OXYBUTYNIN CHLORIDE 10 MG/1
10 TABLET, EXTENDED RELEASE ORAL DAILY
Qty: 30 TABLET | Refills: 11 | Status: SHIPPED | OUTPATIENT
Start: 2025-06-04

## 2025-06-08 NOTE — PROGRESS NOTES
Primary Care Physician: Antoine Medeiros MD  Date of Visit: 06/09/2025  8:20 AM EDT  Location [place] [facility] [place] [address]   Last office visit: 6/6/2024    Chief Complaint:     Follow-up CAD, status post TAVR (4-year)    HPI/Summary  Nasir Camacho is a 70 y.o. male who presents for followup cardiology evaluation.     In 1999, the patient sustained non-STEMI secondary to acute LAD plaque rupture, and underwent PCI/stent.  In 2016, he presented with hemorrhagic stroke and right hemiparesis.  In 2021, he presented with progressive aortic stenosis.  Catheterization showed mild nonobstructive CAD.  He underwent TAVR on June 29, 2021.  He was readmitted 3 weeks later with intraventricular hemorrhage but did not require acute neurosurgical intervention.    He continues on aspirin 81 mg daily, amlodipine 10 mg daily, atorvastatin 40 mg daily, carvedilol 3.125 mg twice daily and losartan 50 mg daily.  Other medications were reviewed and reconciled.    The last echocardiogram on September 4, 2024 was technically difficult.  The ejection fraction was normal, right ventricular systolic function was normal and there was mild periprosthetic aortic valve regurgitation.    Specialty Problems          Cardiology Problems    Accelerated hypertension    Arteriosclerotic cardiovascular disease (ASCVD)    Essential hypertension    History of non-ST elevation myocardial infarction (NSTEMI)    Hypercholesterolemia    S/P TAVR (transcatheter aortic valve replacement)      Social History[1]   RX Allergies[2]  Current Outpatient Medications   Medication Instructions    acetaminophen (TYLENOL) 650 mg, oral, Every 6 hours PRN    amLODIPine (Norvasc) 10 mg tablet 1/2 tablet or 5 mg every day    aspirin 81 mg chewable tablet 1 tablet, oral, Daily    atorvastatin (LIPITOR) 40 mg, oral, Daily    calcium carbonate-vitamin D3 (Oscal-500) 500 mg-10 mcg (400 unit) tablet 1 tablet, oral, 2 times daily    carvedilol (COREG) 3.125 mg, oral, Take with food.     cholecalciferol (Vitamin D-3) 25 MCG (1000 UT) tablet 1 tablet, oral, Daily    citalopram (CELEXA) 20 mg, oral, Daily    losartan (COZAAR) 50 mg, oral, Daily    mv-min-folic-K1-lycopen-lutein (Centrum Silver Men) 684--300 mcg tablet 1 tablet, oral, Daily    oxyBUTYnin XL (DITROPAN-XL) 10 mg, oral, Daily, Do not crush, chew, or split.    polyethylene glycol (GLYCOLAX, MIRALAX) 17 g, oral, Daily PRN    tamsulosin (FLOMAX) 0.4 mg, oral, Daily       ROS    Vital Signs:  There were no vitals filed for this visit.  Wt Readings from Last 2 Encounters:   04/07/25 67.6 kg (149 lb)   11/07/24 70.3 kg (155 lb)     There is no height or weight on file to calculate BMI.     Physical Exam:    ***     Lab Review:  CBC:  Lab Results   Component Value Date    WBC 5.0 04/09/2025    HGB 12.8 (L) 04/09/2025    HCT 38.3 (L) 04/09/2025    MCV 99.0 04/09/2025     04/09/2025       CMP:  Recent Labs     04/09/25  0929   GLUCOSE 97      K 5.0      CO2 27   ANIONGAP 11   BUN 26*   CREATININE 1.01   EGFR 79   ALBUMIN 4.7   ALKPHOS 66   PROT 7.0   ALT 17   AST 19   BILITOT 1.5*         LIPID PANEL:  Lab Results   Component Value Date    CHOL 124 04/01/2024    HDL 60.8 04/01/2024    CHHDL 2.0 04/01/2024    VLDL 9 04/01/2024    TRIG 43 04/01/2024    NHDL 63 04/01/2024       HEME/ENDO:  Lab Results   Component Value Date    TSH 1.92 04/01/2024         Recent Labs     07/18/21  1016   *     Recent Cardiology Tests:    ECG:    ***    Results for orders placed in visit on 06/06/24    ECG 12 lead (Clinic Performed)    Narrative  Sinus bradycardia  Otherwise normal ECG  When compared with ECG of 05-AUG-2021 12:54,  No significant change was found  Confirmed by Carroll Lai ([id number]) on 6/22/2024 9:58:15 AM       Echo:  Echo Results:  Transthoracic Echo (TTE) Complete 09/04/2024    [facility], [address] [place] [address]  Tel [phone] and Fax  [phone]    TRANSTHORACIC ECHOCARDIOGRAM REPORT      Patient Name:      IMAN GRANADOS        Reading Physician:    [id number] Timothy Mauricio MD  Study Date:        9/4/2024             Ordering Provider:    [id number] CARI MARIA  MRN/PID:           [id number]             Fellow:  Accession#:        [id number]         Nurse:  Date of Birth/Age: 1955 / 69 years Sonographer:          Sandie Choudhary RDCS  Gender:            M                    Additional Staff:  Height:            172.72 cm            Admit Date:  Weight:            74.84 kg             Admission Status:     Outpatient  BSA / BMI:         1.88 m2 / 25.09      Encounter#:           [id number]  kg/m2  Blood Pressure:    108/58 mmHg          Department Location:  [facility]  Echo Lab    Study Type:    TRANSTHORACIC ECHO (TTE) COMPLETE  Diagnosis/ICD: Presence of prosthetic heart valve-Z95.2  Indication:    s/p TAVR  CPT Code:      Echo Complete w Full Doppler-81240    Patient History:  Pertinent History: S/p TAVR 34mm Medtronic Evolut 6/29/2021, CAD, MI, HTN,  Graves disease.    Study Detail: The following Echo studies were performed: 2D, M-Mode, Doppler and  color flow.      PHYSICIAN INTERPRETATION:  Left Ventricle: Left ventricular ejection fraction is normal, calculated by Becker's biplane at 64%. There are no regional left ventricular wall motion abnormalities. The left ventricular cavity size is normal. Spectral Doppler shows an abnormal pattern of left ventricular diastolic filling.  Left Atrium: The left atrium is enlarged.  Right Ventricle: The right ventricle is normal in size. There is normal right ventricular global systolic function.  Right Atrium: The right atrium is normal in size.  Aortic Valve: There is a prosthetic aortic valve present. The aortic valve dimensionless index is 0.63. There is mild john paul-prosthetic aortic valve regurgitation. There is mild aortic valve regurgitation. The peak instantaneous gradient of the aortic  valve is 14.4 mmHg. The mean gradient of the aortic valve is 7.9 mmHg. #34 <edtronic Evolut implant 6/29/2021.  Mitral Valve: The mitral valve is mildly thickened. There is trace mitral valve regurgitation.  Tricuspid Valve: The tricuspid valve was not well visualized. Tricuspid regurgitation was not assessed. The right ventricular systolic pressure is unable to be estimated.  Pulmonic Valve: The pulmonic valve is not well visualized. The pulmonic valve regurgitation was not well visualized.  Pericardium: There is no pericardial effusion noted.  Aorta: The aortic root was not well visualized.  In comparison to the previous echocardiogram(s): Compared with study dated 8/12/2022, no significant change.      CONCLUSIONS:  1. Poorly visualized anatomical structures due to suboptimal image quality.  2. Left ventricular ejection fraction is normal, calculated by Becker's biplane at 64%.  3. Spectral Doppler shows an abnormal pattern of left ventricular diastolic filling.  4. There is normal right ventricular global systolic function.  5. The left atrium is enlarged.  6. Mild aortic valve regurgitation.    QUANTITATIVE DATA SUMMARY:  2D MEASUREMENTS:  Normal Ranges:  LAs:           4.05 cm  (2.7-4.0cm)  IVSd:          0.93 cm  (0.6-1.1cm)  LVPWd:         0.89 cm  (0.6-1.1cm)  LVIDd:         4.20 cm  (3.9-5.9cm)  LVIDs:         3.01 cm  LV Mass Index: 64 g/m2  LVEDV Index:   80 ml/m2  LV % FS        28.2 %    LA VOLUME:  Normal Ranges:  LA Vol A4C:        69.5 ml    (22+/-6mL/m2)  LA Vol A2C:        104.2 ml  LA Vol BP:         89.2 ml  LA Vol Index A4C:  36.9ml/m2  LA Vol Index A2C:  55.3 ml/m2  LA Vol Index BP:   47.3 ml/m2  LA Area A4C:       21.4 cm2  LA Area A2C:       25.0 cm2  LA Major Axis A4C: 5.6 cm  LA Major Axis A2C: 5.1 cm  LA Vol A4C:        66.4 ml  LA Vol A2C:        93.1 ml  LA Vol Index BSA:  42.3 ml/m2    RA VOLUME BY A/L METHOD:  Normal Ranges:  RA Area A4C: 9.2 cm2    LV SYSTOLIC FUNCTION BY 2D  PLANIMETRY (MOD):  Normal Ranges:  EF-A4C View:    66 % (>=55%)  EF-A2C View:    62 %  EF-Biplane:     64 %  LV EF Reported: 64 %    LV DIASTOLIC FUNCTION:  Normal Ranges:  MV Peak E:    0.97 m/s    (0.7-1.2 m/s)  MV Peak A:    0.75 m/s    (0.42-0.7 m/s)  E/A Ratio:    1.28        (1.0-2.2)  MV e'         0.115 m/s   (>8.0)  MV lateral e' 0.13 m/s  MV medial e'  0.10 m/s  MV A Dur:     104.66 msec  E/e' Ratio:   8.43        (<8.0)    MITRAL VALVE:  Normal Ranges:  MV DT: 187 msec (150-240msec)    AORTIC VALVE:  Normal Ranges:  AoV Vmax:                1.90 m/s  (<=1.7m/s)  AoV Peak P.4 mmHg (<20mmHg)  AoV Mean P.9 mmHg  (1.7-11.5mmHg)  LVOT Max Jordan:            1.28 m/s  (<=1.1m/s)  AoV VTI:                 44.22 cm  (18-25cm)  LVOT VTI:                27.79 cm  LVOT Diameter:           2.08 cm   (1.8-2.4cm)  AoV Area, VTI:           2.14 cm2  (2.5-5.5cm2)  AoV Area,Vmax:           2.29 cm2  (2.5-4.5cm2)  AoV Dimensionless Index: 0.63    AORTIC INSUFFICIENCY:  AI Vmax:       1.99 m/s  AI Half-time:  538 msec  AI Decel Time: 1853 msec  AI Decel Rate: 107.26 cm/s2      RIGHT VENTRICLE:  RV Basal 3.50 cm  RV Mid   2.90 cm  RV Major 7.5 cm  TAPSE:   25.7 mm  RV s'    0.13 m/s      [id number] Timothy Mauricio MD  Electronically signed on 2024 at 9:10:35 AM        ** Final **       Cath:      Stress Test:  Stress Results:  No results found for this or any previous visit from the past 365 days.         Cardiac Imaging:        Assessment/Plan   ***    Orders:  No orders of the defined types were placed in this encounter.     Followup Appts:  Future Appointments   Date Time Provider Department [place]   2025  8:20 AM Carroll Lai MD [id number] [facility]   2025 10:00 AM Cordell Flores MD [id number] [facility]   2025  8:50 AM Landon Augustin MD [id number] [facility]   2025 11:00 AM Antoine Medeiros MD [id number] [facility]   10/13/2025 11:00 AM Rico Kerr MD [id number] [facility]            ____________________________________________________________  Carroll Lai MD    Senior Attending Physician  [facility] [place]  [facility]    [facility] Chair for Cardiovascular Excellence  [employer]       [1]   Social History  Tobacco Use    Smoking status: Never    Smokeless tobacco: Never   Substance Use Topics    Alcohol use: Yes     Comment: one drink weekly    Drug use: Never   [2]   Allergies  Allergen Reactions    Penicillin Unknown

## 2025-06-09 ENCOUNTER — APPOINTMENT (OUTPATIENT)
Dept: CARDIOLOGY | Facility: CLINIC | Age: 70
End: 2025-06-09
Payer: MEDICARE

## 2025-06-09 ENCOUNTER — APPOINTMENT (OUTPATIENT)
Dept: NEUROLOGY | Facility: CLINIC | Age: 70
End: 2025-06-09
Payer: MEDICARE

## 2025-06-09 VITALS
DIASTOLIC BLOOD PRESSURE: 58 MMHG | RESPIRATION RATE: 16 BRPM | SYSTOLIC BLOOD PRESSURE: 134 MMHG | HEART RATE: 50 BPM | BODY MASS INDEX: 21.62 KG/M2 | WEIGHT: 155 LBS

## 2025-06-09 DIAGNOSIS — G20.A1 PARKINSON'S DISEASE WITHOUT FLUCTUATING MANIFESTATIONS, UNSPECIFIED WHETHER DYSKINESIA PRESENT: ICD-10-CM

## 2025-06-09 DIAGNOSIS — I10 ESSENTIAL HYPERTENSION: Primary | ICD-10-CM

## 2025-06-09 DIAGNOSIS — G20.C PARKINSONISM, UNSPECIFIED PARKINSONISM TYPE (MULTI): Primary | ICD-10-CM

## 2025-06-09 PROCEDURE — 1159F MED LIST DOCD IN RCRD: CPT | Performed by: PSYCHIATRY & NEUROLOGY

## 2025-06-09 PROCEDURE — 99203 OFFICE O/P NEW LOW 30 MIN: CPT | Performed by: PSYCHIATRY & NEUROLOGY

## 2025-06-09 PROCEDURE — 1160F RVW MEDS BY RX/DR IN RCRD: CPT | Performed by: PSYCHIATRY & NEUROLOGY

## 2025-06-09 PROCEDURE — 3075F SYST BP GE 130 - 139MM HG: CPT | Performed by: PSYCHIATRY & NEUROLOGY

## 2025-06-09 PROCEDURE — 1036F TOBACCO NON-USER: CPT | Performed by: PSYCHIATRY & NEUROLOGY

## 2025-06-09 PROCEDURE — 3078F DIAST BP <80 MM HG: CPT | Performed by: PSYCHIATRY & NEUROLOGY

## 2025-06-09 ASSESSMENT — PATIENT HEALTH QUESTIONNAIRE - PHQ9
SUM OF ALL RESPONSES TO PHQ9 QUESTIONS 1 AND 2: 0
1. LITTLE INTEREST OR PLEASURE IN DOING THINGS: NOT AT ALL
2. FEELING DOWN, DEPRESSED OR HOPELESS: NOT AT ALL

## 2025-06-09 ASSESSMENT — ENCOUNTER SYMPTOMS
LOSS OF SENSATION IN FEET: 0
OCCASIONAL FEELINGS OF UNSTEADINESS: 1
DEPRESSION: 0

## 2025-06-09 NOTE — LETTER
Senait 10, 2025     Antoine Medeiros MD  [address] [facility] [id number]    Patient: Nasir Camacho   YOB: 1955   Date of Visit: 6/9/2025       Dear Dr. Antoine Medeiros MD:    Thank you for referring Nasir Camacho to me for evaluation. Below are my notes for this consultation.  If you have questions, please do not hesitate to call me. I look forward to following your patient along with you.       Sincerely,     Cordell Flores MD      CC: No Recipients  ______________________________________________________________________________________    Subjective     Nasir Camacho is a right handed  70 y.o. year old male who presents with Parkinson's Disease. Patient is accompanied by: spouse Mey  Visit type: new patient visit     PMHx: h/o AS s/p TAVR 6/29/2021 on DAPT, HTN, HLD, CAD 1999 s/p PCI, previous L ICH 2016, pulmonary nodule p/w whole-body fatigue, CTH isolated R lateral horn IVH, min L occipital horn IVH     Patient reports that during his last PCP visit to establish care with new PCP there was concern for PD     Residual stroke deficits: R sides subtle weakness.   Reports in the last year has noticed some episodes of chocking on cereal that started after hospitalization for hip surgery for which he needed to be intubated and had a long recovery. Dysphagia is not progressing.    Voice is softer, denies hx of constipation, denies OH, denies slowness, stiffness, no RBD, sleep is good. Has long standing urinary issues for which he follows with urology.      Fhx: Mother chemotherapy induced symptoms similar to ALS    SoHx: Denies smoking, drinks occasionally    Problem List[1]   Medical History[2]   Surgical History[3]   Social History     Socioeconomic History   • Marital status:      Spouse name: Not on file   • Number of children: Not on file   • Years of education: Not on file   • Highest education level: Not on file   Occupational History   • Not on file   Tobacco Use   • Smoking  status: Never   • Smokeless tobacco: Never   Substance and Sexual Activity   • Alcohol use: Yes     Comment: one drink weekly   • Drug use: Never   • Sexual activity: Not on file   Other Topics Concern   • Not on file   Social History Narrative   • Not on file     Social Drivers of Health     Financial Resource Strain: Patient Declined (9/26/2024)    Overall Financial Resource Strain (CARDIA)    • Difficulty of Paying Living Expenses: Patient declined   Food Insecurity: Patient Declined (9/25/2024)    Hunger Vital Sign    • Worried About Running Out of Food in the Last Year: Patient declined    • Ran Out of Food in the Last Year: Patient declined   Transportation Needs: No Transportation Needs (11/6/2024)    OASIS : Transportation    • Lack of Transportation (Medical): No    • Lack of Transportation (Non-Medical): No    • Patient Unable or Declines to Respond: No   Physical Activity: Not on File (2/3/2021)    Received from [facility]    Physical Activity    • Physical Activity: 0   Stress: Not on File (2/3/2021)    Received from [facility]    Stress    • Stress: 0   Social Connections: Feeling Socially Integrated (11/6/2024)    OASIS : Social Isolation    • Frequency of experiencing loneliness or isolation: Rarely   Intimate Partner Violence: Not At Risk (9/25/2024)    Humiliation, Afraid, Rape, and Kick questionnaire    • Fear of Current or Ex-Partner: No    • Emotionally Abused: No    • Physically Abused: No    • Sexually Abused: No   Housing Stability: Unknown (9/26/2024)    Housing Stability Vital Sign    • Unable to Pay for Housing in the Last Year: Patient declined    • Number of Times Moved in the Last Year: 0    • Homeless in the Last Year: No      Family History[4]   Patient Health Questionnaire-2 Score: 0          Review of Systems  All other system have been reviewed and are negative for complaint.    Vitals:    06/09/25 1012   BP: 134/58   BP Location: Right arm   Patient Position: Sitting   BP Cuff  Size: Adult   Pulse: 50   Resp: 16   Weight: 70.3 kg (155 lb)     Objective   Neurological Exam  Mental Status  Awake and alert. Oriented to person, place, time and situation. Recent and remote memory are intact. Speech is normal. Language is fluent with no aphasia. Attention and concentration are normal.    Cranial Nerves  CN II: Visual fields full to confrontation.  CN III, IV, VI: Normal lids and orbits bilaterally. Pupils equal round and reactive to light bilaterally. Some limitation of left gaze and upward gaze.  CN V: Facial sensation is normal.  CN VII: Full and symmetric facial movement.  CN VIII: Hearing is normal.  CN IX, X: Palate elevates symmetrically  CN XI: Shoulder shrug strength is normal.  CN XII: Tongue midline without atrophy or fasciculations.    Motor   Strength is 5/5 throughout all four extremities.  Very subtle weakness in the R side (Chronic since L BG stroke)  Bradykinesia on the R (grade 2) likely related to residual hemiparesis  Very mild bradykinesia on the L (Grade 0.5 with finger and foot tapping)  Diffused paratonia .    Sensory  Decreased light touch on the R proximal LE but otherwise intact.    Reflexes                                            Right                      Left  Brachioradialis                    2+                         2+  Biceps                                 2+                         2+  Triceps                                2+                         2+  Patellar                                2+                         2+  Achilles                                2+                         2+  Right Plantar: downgoing  Left Plantar: downgoing    Coordination  Right: Finger-to-nose normal.Left: Finger-to-nose normal.  Mild atypical FNF on the R likely related to hemiparesis .    Gait    Paretic gait with unsteadiness and wide base.      Thyroid Stimulating Hormone   Date Value Ref Range Status   04/01/2024 1.92 0.44 - 3.98 mIU/L Final       Assessment/Plan    Diagnoses and all orders for this visit:  Parkinsonism, unspecified Parkinsonism type (Multi)  -     Referral to Neurology  -     Referral to Neurology; Future  -     Referral to Occupational Therapy; Future  -     Referral to Physical Therapy; Future  Parkinson's disease without fluctuating manifestations, unspecified whether dyskinesia present      Nasir Camacho is a 70 y.o. RH male with h/o AS s/p TAVR 6/29/2021 on DAPT, HTN, HLD, CAD 1999 s/p PCI, previous L ICH 2016, pulmonary nodule p/w whole-body fatigue, CTH isolated R lateral horn IVH, min L occipital horn IVH right hip arthritis and fracture s/p right hip removal of hardware and right total hip arthroplasty, ORIF R acetabulum on 9/25 by Dr. Kerr. Patient referred by PCP for evaluation of parkinsonism.  Prior stroke and hip surgery have left patient with some right-sided weakness and spasticity which per patient and wife continue to slowly improve over time.  Patient and wife confirm improvement in strength and gait since his last surgeries about a year ago.  On neurological examination there is mild parkinsonism (right sided>> left-sided bradykinesia with diffused paratonia in the spastic/paretic gait).  Left-sided findings are confounded by residual deficits from stroke and surgeries however unable to completely rule out subtle underlying true parkinsonism (although report from wife and patient of improving motor function is reassuring).  Will plan to follow-up over time for any progression.  Referral to neuro-ophthalmology for incidental finding of limited left and upward gaze.    We discussed the following  - We only see subtle signs of parkinsonism on your exam. At this point it is not possible to say for sure if this could be related to parkinson's disease.  - As discussed we will follow you over time to se if anything changes  - We have placed referral for driving evaluation and for physical therapy  - We have placed a referral to  neurophthalmology for evaluation of your eyes  - Come back to see us in 6 months           Attestation signed by Cordell Flores MD at 6/10/2025  6:04 PM:  I saw and evaluated the patient. I personally obtained the key and critical portions of the history and physical exam or was physically present for key and critical portions performed by the resident/fellow. I reviewed the resident/fellow's documentation and discussed the patient with the resident/fellow. I agree with the resident/fellow's medical decision making as documented in the note.    Time Spent  Prep time on day of patient encounter: 5 minutes  Time spent directly with patient, family or caregiver: 10 minutes  Additional Time Spent on Patient Care Activities: 10 minutes  Documentation Time: 5 minutes  Other Time Spent: 0 minutes  Total: 30 minutes          [1]  Patient Active Problem List  Diagnosis   • Subacromial bursitis   • Stroke (Multi)   • Sprain of shoulder   • Skin changes due to chronic exposure to nonionizing radiation, unspecified   • Sensation of foreign body in eye   • S/P TAVR (transcatheter aortic valve replacement)   • Pulmonary nodule   • Prostate disorder   • Pain of right lower extremity   • Other seborrheic keratosis   • Inflamed seborrheic keratosis   • Neuropathic pain of right lower extremity   • Nephrolithiasis   • Myopia with presbyopia   • Myopia of both eyes   • Melanocytic nevi of other parts of face   • Laceration of right thumb   • Hyperthyroidism   • Hypercholesterolemia   • Hordeolum externum of right upper eyelid   • History of non-ST elevation myocardial infarction (NSTEMI)   • Hemiparesis affecting dominant side as late effect of stroke (Multi)   • Hematuria   • Hemangioma of skin and subcutaneous tissue   • Headache   • Essential hypertension   • Elevated PSA   • Depression   • MGD (meibomian gland disease)   • Arteriosclerotic cardiovascular disease (ASCVD)   • Accelerated hypertension   • Abdominal pain   • ICH  (intracerebral hemorrhage) (Multi)   • Fall, initial encounter   • Fracture of anterior wall of acetabulum   • Urinary frequency   • Screening PSA (prostate specific antigen)   • Closed displaced associated transverse-posterior fracture of right acetabulum   • Post-traumatic osteoarthritis of right hip   • Presence of retained hardware   • Benign prostatic hyperplasia (BPH) with urinary urgency   • Hip osteoarthritis   • Acute right hip pain   [2]  Past Medical History:  Diagnosis Date   • Aortic stenosis     s/p TAVR 6/29/21   • BRCA positive    • Chronic pain disorder    • Coronary artery disease    • COVID-19 09/23/2022   • Displaced fracture of right acetabulum    • Graves disease    • Hip fracture (Multi)    • Hyperlipidemia    • Hypertension    • Hyperthyroidism    • Myocardial infarction (Multi) 1999    acute plaque rupture in the LAD   • Pelvic fracture (Multi)    • Pulmonary nodule     stable   • Stroke (Multi) 05/2016    hemorrhagic stroke with right hemiparesis in 2016   [3]  Past Surgical History:  Procedure Laterality Date   • ANKLE SURGERY     • CARDIAC CATHETERIZATION     • CARDIAC VALVE REPLACEMENT  06/29/2021    transcatheter aortic valve replacement   • CORONARY ANGIOPLASTY WITH STENT PLACEMENT  10/06/2014    Cath Stent Placement   • CT ANGIO NECK  07/18/2021    CT NECK ANGIO W AND WO IV CONTRAST 7/18/2021 [facility]   • CT HEAD ANGIO W AND WO IV CONTRAST  07/18/2021    CT HEAD ANGIO W AND WO IV CONTRAST 7/18/2021 [facility]   • FEMUR FRACTURE SURGERY  11/06/2014    Femur Repair   [4]  No family history on file.       [1]  Patient Active Problem List  Diagnosis   • Subacromial bursitis   • Stroke (Multi)   • Sprain of shoulder   • Skin changes due to chronic exposure to nonionizing radiation, unspecified   • Sensation of foreign body in eye   • S/P TAVR (transcatheter aortic valve replacement)   • Pulmonary nodule   • Prostate disorder   • Pain of right lower extremity   • Other  seborrheic keratosis   • Inflamed seborrheic keratosis   • Neuropathic pain of right lower extremity   • Nephrolithiasis   • Myopia with presbyopia   • Myopia of both eyes   • Melanocytic nevi of other parts of face   • Laceration of right thumb   • Hyperthyroidism   • Hypercholesterolemia   • Hordeolum externum of right upper eyelid   • History of non-ST elevation myocardial infarction (NSTEMI)   • Hemiparesis affecting dominant side as late effect of stroke (Multi)   • Hematuria   • Hemangioma of skin and subcutaneous tissue   • Headache   • Essential hypertension   • Elevated PSA   • Depression   • MGD (meibomian gland disease)   • Arteriosclerotic cardiovascular disease (ASCVD)   • Accelerated hypertension   • Abdominal pain   • ICH (intracerebral hemorrhage) (Multi)   • Fall, initial encounter   • Fracture of anterior wall of acetabulum   • Urinary frequency   • Screening PSA (prostate specific antigen)   • Closed displaced associated transverse-posterior fracture of right acetabulum   • Post-traumatic osteoarthritis of right hip   • Presence of retained hardware   • Benign prostatic hyperplasia (BPH) with urinary urgency   • Hip osteoarthritis   • Acute right hip pain   [2]  Past Medical History:  Diagnosis Date   • Aortic stenosis     s/p TAVR 6/29/21   • BRCA positive    • Chronic pain disorder    • Coronary artery disease    • COVID-19 09/23/2022   • Displaced fracture of right acetabulum    • Graves disease    • Hip fracture (Multi)    • Hyperlipidemia    • Hypertension    • Hyperthyroidism    • Myocardial infarction (Multi) 1999    acute plaque rupture in the LAD   • Pelvic fracture (Multi)    • Pulmonary nodule     stable   • Stroke (Multi) 05/2016    hemorrhagic stroke with right hemiparesis in 2016   [3]  Past Surgical History:  Procedure Laterality Date   • ANKLE SURGERY     • CARDIAC CATHETERIZATION     • CARDIAC VALVE REPLACEMENT  06/29/2021    transcatheter aortic valve replacement   • CORONARY  ANGIOPLASTY WITH STENT PLACEMENT  10/06/2014    Cath Stent Placement   • CT ANGIO NECK  07/18/2021    CT NECK ANGIO W AND WO IV CONTRAST 7/18/2021 [facility]   • CT HEAD ANGIO W AND WO IV CONTRAST  07/18/2021    CT HEAD ANGIO W AND WO IV CONTRAST 7/18/2021 [facility]   • FEMUR FRACTURE SURGERY  11/06/2014    Femur Repair   [4]  No family history on file.

## 2025-06-09 NOTE — PROGRESS NOTES
Subjective     Nasir Camacho is a right handed  70 y.o. year old male who presents with Parkinson's Disease. Patient is accompanied by: spouse Mey  Visit type: new patient visit     PMHx: h/o AS s/p TAVR 6/29/2021 on DAPT, HTN, HLD, CAD 1999 s/p PCI, previous L ICH 2016, pulmonary nodule p/w whole-body fatigue, CTH isolated R lateral horn IVH, min L occipital horn IVH     Patient reports that during his last PCP visit to establish care with new PCP there was concern for PD     Residual stroke deficits: R sides subtle weakness.   Reports in the last year has noticed some episodes of chocking on cereal that started after hospitalization for hip surgery for which he needed to be intubated and had a long recovery. Dysphagia is not progressing.    Voice is softer, denies hx of constipation, denies OH, denies slowness, stiffness, no RBD, sleep is good. Has long standing urinary issues for which he follows with urology.      Fhx: Mother chemotherapy induced symptoms similar to ALS    SoHx: Denies smoking, drinks occasionally    Problem List[1]   Medical History[2]   Surgical History[3]   Social History     Socioeconomic History    Marital status:      Spouse name: Not on file    Number of children: Not on file    Years of education: Not on file    Highest education level: Not on file   Occupational History    Not on file   Tobacco Use    Smoking status: Never    Smokeless tobacco: Never   Substance and Sexual Activity    Alcohol use: Yes     Comment: one drink weekly    Drug use: Never    Sexual activity: Not on file   Other Topics Concern    Not on file   Social History Narrative    Not on file     Social Drivers of Health     Financial Resource Strain: Patient Declined (9/26/2024)    Overall Financial Resource Strain (CARDIA)     Difficulty of Paying Living Expenses: Patient declined   Food Insecurity: Patient Declined (9/25/2024)    Hunger Vital Sign     Worried About Running Out of Food in the Last Year:  Patient declined     Ran Out of Food in the Last Year: Patient declined   Transportation Needs: No Transportation Needs (11/6/2024)    OASIS : Transportation     Lack of Transportation (Medical): No     Lack of Transportation (Non-Medical): No     Patient Unable or Declines to Respond: No   Physical Activity: Not on File (2/3/2021)    Received from [employer]    Physical Activity     Physical Activity: 0   Stress: Not on File (2/3/2021)    Received from [employer]    Stress     Stress: 0   Social Connections: Feeling Socially Integrated (11/6/2024)    OASIS : Social Isolation     Frequency of experiencing loneliness or isolation: Rarely   Intimate Partner Violence: Not At Risk (9/25/2024)    Humiliation, Afraid, Rape, and Kick questionnaire     Fear of Current or Ex-Partner: No     Emotionally Abused: No     Physically Abused: No     Sexually Abused: No   Housing Stability: Unknown (9/26/2024)    Housing Stability Vital Sign     Unable to Pay for Housing in the Last Year: Patient declined     Number of Times Moved in the Last Year: 0     Homeless in the Last Year: No      Family History[4]   Patient Health Questionnaire-2 Score: 0          Review of Systems  All other system have been reviewed and are negative for complaint.    Vitals:    06/09/25 1012   BP: 134/58   BP Location: Right arm   Patient Position: Sitting   BP Cuff Size: Adult   Pulse: 50   Resp: 16   Weight: 70.3 kg (155 lb)     Objective   Neurological Exam  Mental Status  Awake and alert. Oriented to person, place, time and situation. Recent and remote memory are intact. Speech is normal. Language is fluent with no aphasia. Attention and concentration are normal.    Cranial Nerves  CN II: Visual fields full to confrontation.  CN III, IV, VI: Normal lids and orbits bilaterally. Pupils equal round and reactive to light bilaterally. Some limitation of left gaze and upward gaze.  CN V: Facial sensation is normal.  CN VII: Full and symmetric facial  movement.  CN VIII: Hearing is normal.  CN IX, X: Palate elevates symmetrically  CN XI: Shoulder shrug strength is normal.  CN XII: Tongue midline without atrophy or fasciculations.    Motor   Strength is 5/5 throughout all four extremities.  Very subtle weakness in the R side (Chronic since L BG stroke)  Bradykinesia on the R (grade 2) likely related to residual hemiparesis  Very mild bradykinesia on the L (Grade 0.5 with finger and foot tapping)  Diffused paratonia .    Sensory  Decreased light touch on the R proximal LE but otherwise intact.    Reflexes                                            Right                      Left  Brachioradialis                    2+                         2+  Biceps                                 2+                         2+  Triceps                                2+                         2+  Patellar                                2+                         2+  Achilles                                2+                         2+  Right Plantar: downgoing  Left Plantar: downgoing    Coordination  Right: Finger-to-nose normal.Left: Finger-to-nose normal.  Mild atypical FNF on the R likely related to hemiparesis .    Gait    Paretic gait with unsteadiness and wide base.      Thyroid Stimulating Hormone   Date Value Ref Range Status   04/01/2024 1.92 0.44 - 3.98 mIU/L Final       Assessment/Plan   Diagnoses and all orders for this visit:  Parkinsonism, unspecified Parkinsonism type (Multi)  -     Referral to Neurology  -     Referral to Neurology; Future  -     Referral to Occupational Therapy; Future  -     Referral to Physical Therapy; Future  Parkinson's disease without fluctuating manifestations, unspecified whether dyskinesia present      Nasir Camacho is a 70 y.o. RH male with h/o AS s/p TAVR 6/29/2021 on DAPT, HTN, HLD, CAD 1999 s/p PCI, previous L ICH 2016, pulmonary nodule p/w whole-body fatigue, CTH isolated R lateral horn IVH, min L occipital horn IVH right hip  arthritis and fracture s/p right hip removal of hardware and right total hip arthroplasty, ORIF R acetabulum on 9/25 by Dr. Kerr. Patient referred by PCP for evaluation of parkinsonism.  Prior stroke and hip surgery have left patient with some right-sided weakness and spasticity which per patient and wife continue to slowly improve over time.  Patient and wife confirm improvement in strength and gait since his last surgeries about a year ago.  On neurological examination there is mild parkinsonism (right sided>> left-sided bradykinesia with diffused paratonia in the spastic/paretic gait).  Left-sided findings are confounded by residual deficits from stroke and surgeries however unable to completely rule out subtle underlying true parkinsonism (although report from wife and patient of improving motor function is reassuring).  Will plan to follow-up over time for any progression.  Referral to neuro-ophthalmology for incidental finding of limited left and upward gaze.    We discussed the following  - We only see subtle signs of parkinsonism on your exam. At this point it is not possible to say for sure if this could be related to parkinson's disease.  - As discussed we will follow you over time to se if anything changes  - We have placed referral for driving evaluation and for physical therapy  - We have placed a referral to neurophthalmology for evaluation of your eyes  - Come back to see us in 6 months            [1]   Patient Active Problem List  Diagnosis    Subacromial bursitis    Stroke (Multi)    Sprain of shoulder    Skin changes due to chronic exposure to nonionizing radiation, unspecified    Sensation of foreign body in eye    S/P TAVR (transcatheter aortic valve replacement)    Pulmonary nodule    Prostate disorder    Pain of right lower extremity    Other seborrheic keratosis    Inflamed seborrheic keratosis    Neuropathic pain of right lower extremity    Nephrolithiasis    Myopia with presbyopia     Myopia of both eyes    Melanocytic nevi of other parts of face    Laceration of right thumb    Hyperthyroidism    Hypercholesterolemia    Hordeolum externum of right upper eyelid    History of non-ST elevation myocardial infarction (NSTEMI)    Hemiparesis affecting dominant side as late effect of stroke (Multi)    Hematuria    Hemangioma of skin and subcutaneous tissue    Headache    Essential hypertension    Elevated PSA    Depression    MGD (meibomian gland disease)    Arteriosclerotic cardiovascular disease (ASCVD)    Accelerated hypertension    Abdominal pain    ICH (intracerebral hemorrhage) (Multi)    Fall, initial encounter    Fracture of anterior wall of acetabulum    Urinary frequency    Screening PSA (prostate specific antigen)    Closed displaced associated transverse-posterior fracture of right acetabulum    Post-traumatic osteoarthritis of right hip    Presence of retained hardware    Benign prostatic hyperplasia (BPH) with urinary urgency    Hip osteoarthritis    Acute right hip pain   [2]   Past Medical History:  Diagnosis Date    Aortic stenosis     s/p TAVR 6/29/21    BRCA positive     Chronic pain disorder     Coronary artery disease     COVID-19 09/23/2022    Displaced fracture of right acetabulum     Graves disease     Hip fracture (Multi)     Hyperlipidemia     Hypertension     Hyperthyroidism     Myocardial infarction (Multi) 1999    acute plaque rupture in the LAD    Pelvic fracture (Multi)     Pulmonary nodule     stable    Stroke (Multi) 05/2016    hemorrhagic stroke with right hemiparesis in 2016   [3]   Past Surgical History:  Procedure Laterality Date    ANKLE SURGERY      CARDIAC CATHETERIZATION      CARDIAC VALVE REPLACEMENT  06/29/2021    transcatheter aortic valve replacement    CORONARY ANGIOPLASTY WITH STENT PLACEMENT  10/06/2014    Cath Stent Placement    CT ANGIO NECK  07/18/2021    CT NECK ANGIO W AND WO IV CONTRAST 7/18/2021 [facility] CLINICAL LEGACY    CT HEAD ANGIO W AND WO IV  CONTRAST  07/18/2021    CT HEAD ANGIO W AND WO IV CONTRAST 7/18/2021 [facility] CLINICAL LEGACY    FEMUR FRACTURE SURGERY  11/06/2014    Femur Repair   [4] No family history on file.

## 2025-06-09 NOTE — PATIENT INSTRUCTIONS
Pleasure meeting you today. We discussed the following:  - We only see subtle signs of parkinsonism on your exam. At this point it is not possible to say for sure if this could be related to parkinson's disease.  - As discussed we will follow you over time to se if anything changes  - We have placed referral for driving evaluation and for physical therapy  - We have placed a referral to neurophthalmology for evaluation of your eyes  - Come back to see us in 6 months

## 2025-07-01 NOTE — PROGRESS NOTES
Primary Care Physician: Antoine Medeiros MD  Date of Visit: 07/02/2025  4:40 PM EDT  Location of visit: [facility] [address]   Last office visit: 6/6/2024    Chief Complaint:     Follow-up CAD, status post TAVR (4-year)    HPI/Summary  Nasir Camacho is a 70 y.o. male who presents for followup cardiology evaluation.     In 1999, the patient sustained non-STEMI secondary to acute LAD plaque rupture, and underwent PCI/stent.  In 2016, he presented with hemorrhagic stroke and right hemiparesis.  In 2021, he presented with progressive aortic stenosis.  Catheterization showed mild nonobstructive CAD.  He underwent TAVR on June 29, 2021.  He was readmitted 3 weeks later with intraventricular hemorrhage but did not require acute neurosurgical intervention.    He continues on aspirin 81 mg daily, amlodipine 5 mg daily, atorvastatin 40 mg daily, carvedilol 3.125 mg twice daily and losartan 50 mg daily.  Other medications were reviewed and reconciled.    The last echocardiogram on September 4, 2024 was technically difficult.  The ejection fraction was normal, right ventricular systolic function was normal and there was mild periprosthetic aortic valve regurgitation.  Last lipid profile 1 year ago was satisfactory, with LDL 55 mg/dL.    No longer requiring physical therapy, but does have a , 30 minutes twice a week at home.  He also does exercises on his own at least once per week.    Specialty Problems          Cardiology Problems    Accelerated hypertension    Arteriosclerotic cardiovascular disease (ASCVD)    Essential hypertension    History of non-ST elevation myocardial infarction (NSTEMI)    Hypercholesterolemia    S/P TAVR (transcatheter aortic valve replacement)      Social History[1]   RX Allergies[2]  Current Outpatient Medications   Medication Instructions    acetaminophen (TYLENOL) 650 mg, oral, Every 6 hours PRN    amLODIPine (Norvasc) 10 mg tablet 1/2 tablet or 5 mg every day    aspirin 81 mg chewable  tablet 1 tablet, oral, Daily    atorvastatin (LIPITOR) 40 mg, oral, Daily    calcium carbonate-vitamin D3 (Oscal-500) 500 mg-10 mcg (400 unit) tablet 1 tablet, oral, 2 times daily    carvedilol (COREG) 3.125 mg, oral, Take with food.    cholecalciferol (Vitamin D-3) 25 MCG (1000 UT) tablet 1 tablet, oral, Daily    citalopram (CELEXA) 20 mg, oral, Daily    losartan (COZAAR) 50 mg, oral, Daily    mv-min-folic-K1-lycopen-lutein (Centrum Silver Men) 120--300 mcg tablet 1 tablet, oral, Daily    oxyBUTYnin XL (DITROPAN-XL) 10 mg, oral, Daily, Do not crush, chew, or split.       Review of Systems   Constitutional: Positive for weight loss. Negative for decreased appetite.   Cardiovascular:  Negative for chest pain, dyspnea on exertion, irregular heartbeat, leg swelling, near-syncope, orthopnea, palpitations, paroxysmal nocturnal dyspnea and syncope.   Respiratory:  Negative for cough and shortness of breath.    Musculoskeletal:  Positive for joint pain.       Vital Signs:  Vitals:    07/02/25 1707   BP: 130/51   BP Location: Left arm   Patient Position: Sitting   BP Cuff Size: Small adult   Pulse: 63   SpO2: 95%   Weight: 69.9 kg (154 lb)     Wt Readings from Last 2 Encounters:   07/02/25 69.9 kg (154 lb)   06/09/25 70.3 kg (155 lb)     Body mass index is 21.48 kg/m².     Physical Exam:    He was not in any acute distress.  Voice no complaints.  Lung fields clear.  Left carotid bruit, newly recognized.  Heart sounds regular.  Grade 2 systolic murmur across aortic bioprosthesis, second right interspace.  No diastolic murmur.  No S3.  No edema.        Lab Review:  CBC:  Lab Results   Component Value Date    WBC 5.0 04/09/2025    HGB 12.8 (L) 04/09/2025    HCT 38.3 (L) 04/09/2025    MCV 99.0 04/09/2025     04/09/2025       CMP:  Recent Labs     04/09/25  0929   GLUCOSE 97      K 5.0      CO2 27   ANIONGAP 11   BUN 26*   CREATININE 1.01   EGFR 79   ALBUMIN 4.7   ALKPHOS 66   PROT 7.0   ALT 17   AST 19  "  BILITOT 1.5*         LIPID PANEL:  Lab Results   Component Value Date    CHOL 124 04/01/2024    HDL 60.8 04/01/2024    CHHDL 2.0 04/01/2024    VLDL 9 04/01/2024    TRIG 43 04/01/2024    NHDL 63 04/01/2024       HEME/ENDO:  Lab Results   Component Value Date    TSH 1.92 04/01/2024         No results for input(s): \"BNP\" in the last 8760 hours.    Recent Cardiology Tests:    ECG:    November 7, 2024 tracing reviewed shows sinus rhythm, minimal voltage LVH may be normal variant, otherwise normal ECG.    Results for orders placed in visit on 06/06/24    ECG 12 lead (Clinic Performed)    Narrative  Sinus bradycardia  Otherwise normal ECG  When compared with ECG of 05-AUG-2021 12:54,  No significant change was found  Confirmed by Carroll Maria ([id number]) on 6/22/2024 9:58:15 AM       Echo:  Echo Results:  Transthoracic Echo (TTE) Complete 09/04/2024    [facility], [address] [place] [address]  Tel [phone] and Fax [phone]    TRANSTHORACIC ECHOCARDIOGRAM REPORT      Patient Name:      IMAN Jeffrey Physician:    [id number] Mauricio MD  Study Date:        9/4/2024             Ordering Provider:    [id number] CARROLL MARIA  MRN/PID:           [id number]             Fellow:  Accession#:        [id number]         Nurse:  Date of Birth/Age: 1955 / 69 years Sonographer:          Sandie Choduhary RDCS  Gender:            M                    Additional Staff:  Height:            172.72 cm            Admit Date:  Weight:            74.84 kg             Admission Status:     Outpatient  BSA / BMI:         1.88 m2 / 25.09      Encounter#:           [id number]  kg/m2  Blood Pressure:    108/58 mmHg          Department Location:  [facility]  Echo Lab    Study Type:    TRANSTHORACIC ECHO (TTE) COMPLETE  Diagnosis/ICD: Presence of prosthetic heart valve-Z95.2  Indication:    s/p TAVR  CPT Code:      Echo Complete w Full Doppler-[id number]    Patient " History:  Pertinent History: S/p TAVR 34mm Medtronic Evolut 6/29/2021, CAD, MI, HTN,  Graves disease.    Study Detail: The following Echo studies were performed: 2D, M-Mode, Doppler and  color flow.      PHYSICIAN INTERPRETATION:  Left Ventricle: Left ventricular ejection fraction is normal, calculated by Becker's biplane at 64%. There are no regional left ventricular wall motion abnormalities. The left ventricular cavity size is normal. Spectral Doppler shows an abnormal pattern of left ventricular diastolic filling.  Left Atrium: The left atrium is enlarged.  Right Ventricle: The right ventricle is normal in size. There is normal right ventricular global systolic function.  Right Atrium: The right atrium is normal in size.  Aortic Valve: There is a prosthetic aortic valve present. The aortic valve dimensionless index is 0.63. There is mild john paul-prosthetic aortic valve regurgitation. There is mild aortic valve regurgitation. The peak instantaneous gradient of the aortic valve is 14.4 mmHg. The mean gradient of the aortic valve is 7.9 mmHg. #34 <edtronic Evolut implant 6/29/2021.  Mitral Valve: The mitral valve is mildly thickened. There is trace mitral valve regurgitation.  Tricuspid Valve: The tricuspid valve was not well visualized. Tricuspid regurgitation was not assessed. The right ventricular systolic pressure is unable to be estimated.  Pulmonic Valve: The pulmonic valve is not well visualized. The pulmonic valve regurgitation was not well visualized.  Pericardium: There is no pericardial effusion noted.  Aorta: The aortic root was not well visualized.  In comparison to the previous echocardiogram(s): Compared with study dated 8/12/2022, no significant change.      CONCLUSIONS:  1. Poorly visualized anatomical structures due to suboptimal image quality.  2. Left ventricular ejection fraction is normal, calculated by Becker's biplane at 64%.  3. Spectral Doppler shows an abnormal pattern of left ventricular  diastolic filling.  4. There is normal right ventricular global systolic function.  5. The left atrium is enlarged.  6. Mild aortic valve regurgitation.    QUANTITATIVE DATA SUMMARY:  2D MEASUREMENTS:  Normal Ranges:  LAs:           4.05 cm  (2.7-4.0cm)  IVSd:          0.93 cm  (0.6-1.1cm)  LVPWd:         0.89 cm  (0.6-1.1cm)  LVIDd:         4.20 cm  (3.9-5.9cm)  LVIDs:         3.01 cm  LV Mass Index: 64 g/m2  LVEDV Index:   80 ml/m2  LV % FS        28.2 %    LA VOLUME:  Normal Ranges:  LA Vol A4C:        69.5 ml    (22+/-6mL/m2)  LA Vol A2C:        104.2 ml  LA Vol BP:         89.2 ml  LA Vol Index A4C:  36.9ml/m2  LA Vol Index A2C:  55.3 ml/m2  LA Vol Index BP:   47.3 ml/m2  LA Area A4C:       21.4 cm2  LA Area A2C:       25.0 cm2  LA Major Axis A4C: 5.6 cm  LA Major Axis A2C: 5.1 cm  LA Vol A4C:        66.4 ml  LA Vol A2C:        93.1 ml  LA Vol Index BSA:  42.3 ml/m2    RA VOLUME BY A/L METHOD:  Normal Ranges:  RA Area A4C: 9.2 cm2    LV SYSTOLIC FUNCTION BY 2D PLANIMETRY (MOD):  Normal Ranges:  EF-A4C View:    66 % (>=55%)  EF-A2C View:    62 %  EF-Biplane:     64 %  LV EF Reported: 64 %    LV DIASTOLIC FUNCTION:  Normal Ranges:  MV Peak E:    0.97 m/s    (0.7-1.2 m/s)  MV Peak A:    0.75 m/s    (0.42-0.7 m/s)  E/A Ratio:    1.28        (1.0-2.2)  MV e'         0.115 m/s   (>8.0)  MV lateral e' 0.13 m/s  MV medial e'  0.10 m/s  MV A Dur:     104.66 msec  E/e' Ratio:   8.43        (<8.0)    MITRAL VALVE:  Normal Ranges:  MV DT: 187 msec (150-240msec)    AORTIC VALVE:  Normal Ranges:  AoV Vmax:                1.90 m/s  (<=1.7m/s)  AoV Peak P.4 mmHg (<20mmHg)  AoV Mean P.9 mmHg  (1.7-11.5mmHg)  LVOT Max Jordan:            1.28 m/s  (<=1.1m/s)  AoV VTI:                 44.22 cm  (18-25cm)  LVOT VTI:                27.79 cm  LVOT Diameter:           2.08 cm   (1.8-2.4cm)  AoV Area, VTI:           2.14 cm2  (2.5-5.5cm2)  AoV Area,Vmax:           2.29 cm2  (2.5-4.5cm2)  AoV Dimensionless  Index: 0.63    AORTIC INSUFFICIENCY:  AI Vmax:       1.99 m/s  AI Half-time:  538 msec  AI Decel Time: 1853 msec  AI Decel Rate: 107.26 cm/s2      RIGHT VENTRICLE:  RV Basal 3.50 cm  RV Mid   2.90 cm  RV Major 7.5 cm  TAPSE:   25.7 mm  RV s'    0.13 m/s      [id number] Timothy Mauircio MD  Electronically signed on 9/5/2024 at 9:10:35 AM        ** Final **       Cath:      Stress Test:  Stress Results:  No results found for this or any previous visit from the past 365 days.         Cardiac Imaging:        Assessment/Plan   In summary, the patient is status post remote non-STEMI. He is now 4 years status post-TAVR. The echocardiogram 1 year following TAVR showed normal function of the aortic valve with mild aortic regurgitation.  Blood pressures are satisfactory, lipids are at goal.  A carotid duplex last year showed no significant carotid artery stenosis.  We reviewed the echocardiogram performed on September 4, 2024 with the patient.  There is mild periprosthetic aortic valve regurgitation.  The ejection fraction is normal, right ventricular systolic function is normal.  He has made a recovery from right hip fracture followed by ORIF and total hip replacement.  We are pleased that he is continuing exercise with a  after completion of physical therapy.    PLAN: Continue current medication regimen.  Repeat echocardiogram in 1 year, with 1 year follow-up office visit.            Orders:  No orders of the defined types were placed in this encounter.     Followup Appts:  Future Appointments   Date Time Provider Department Center   8/7/2025  8:50 AM Landon Augustin MD AHUURO Psychiatric   8/7/2025 11:00 AM Antoine Medeiros MD [id number] Psychiatric   10/13/2025 11:00 AM Rico Kerr MD [id number] Psychiatric   12/9/2025 10:30 AM Cordell Flores MD [id number] Psychiatric   7/2/2026 11:00 AM Carroll Lai MD [id number] Psychiatric           ____________________________________________________________  Carroll aLi MD    Senior  Attending Physician  [facility]  [facility]    [facility]  [employer]         [1]   Social History  Tobacco Use    Smoking status: Never    Smokeless tobacco: Never   Substance Use Topics    Alcohol use: Yes     Comment: one drink weekly    Drug use: Never   [2]   Allergies  Allergen Reactions    Penicillin Unknown

## 2025-07-02 ENCOUNTER — OFFICE VISIT (OUTPATIENT)
Dept: CARDIOLOGY | Facility: CLINIC | Age: 70
End: 2025-07-02
Payer: MEDICARE

## 2025-07-02 VITALS
BODY MASS INDEX: 21.48 KG/M2 | WEIGHT: 154 LBS | OXYGEN SATURATION: 95 % | SYSTOLIC BLOOD PRESSURE: 130 MMHG | DIASTOLIC BLOOD PRESSURE: 51 MMHG | HEART RATE: 63 BPM

## 2025-07-02 DIAGNOSIS — I25.10 ARTERIOSCLEROTIC CARDIOVASCULAR DISEASE (ASCVD): ICD-10-CM

## 2025-07-02 DIAGNOSIS — I25.2 HISTORY OF NON-ST ELEVATION MYOCARDIAL INFARCTION (NSTEMI): Primary | ICD-10-CM

## 2025-07-02 DIAGNOSIS — Z95.2 S/P TAVR (TRANSCATHETER AORTIC VALVE REPLACEMENT): ICD-10-CM

## 2025-07-02 DIAGNOSIS — I10 ESSENTIAL HYPERTENSION: ICD-10-CM

## 2025-07-02 PROCEDURE — 1036F TOBACCO NON-USER: CPT | Performed by: INTERNAL MEDICINE

## 2025-07-02 PROCEDURE — 3078F DIAST BP <80 MM HG: CPT | Performed by: INTERNAL MEDICINE

## 2025-07-02 PROCEDURE — 99214 OFFICE O/P EST MOD 30 MIN: CPT | Performed by: INTERNAL MEDICINE

## 2025-07-02 PROCEDURE — 1126F AMNT PAIN NOTED NONE PRSNT: CPT | Performed by: INTERNAL MEDICINE

## 2025-07-02 PROCEDURE — 93005 ELECTROCARDIOGRAM TRACING: CPT | Performed by: INTERNAL MEDICINE

## 2025-07-02 PROCEDURE — 1159F MED LIST DOCD IN RCRD: CPT | Performed by: INTERNAL MEDICINE

## 2025-07-02 PROCEDURE — 3075F SYST BP GE 130 - 139MM HG: CPT | Performed by: INTERNAL MEDICINE

## 2025-07-02 PROCEDURE — G2211 COMPLEX E/M VISIT ADD ON: HCPCS | Performed by: INTERNAL MEDICINE

## 2025-07-02 PROCEDURE — 99212 OFFICE O/P EST SF 10 MIN: CPT

## 2025-07-02 PROCEDURE — 1160F RVW MEDS BY RX/DR IN RCRD: CPT | Performed by: INTERNAL MEDICINE

## 2025-07-02 ASSESSMENT — ENCOUNTER SYMPTOMS
PND: 0
PALPITATIONS: 0
OCCASIONAL FEELINGS OF UNSTEADINESS: 1
NEAR-SYNCOPE: 0
SYNCOPE: 0
WEIGHT LOSS: 1
DEPRESSION: 0
COUGH: 0
IRREGULAR HEARTBEAT: 0
DYSPNEA ON EXERTION: 0
LOSS OF SENSATION IN FEET: 0
SHORTNESS OF BREATH: 0
DECREASED APPETITE: 0
ORTHOPNEA: 0

## 2025-07-02 ASSESSMENT — PAIN SCALES - GENERAL: PAINLEVEL_OUTOF10: 0-NO PAIN

## 2025-07-29 DIAGNOSIS — I25.10 ARTERIOSCLEROTIC CARDIOVASCULAR DISEASE (ASCVD): ICD-10-CM

## 2025-07-29 RX ORDER — ATORVASTATIN CALCIUM 40 MG/1
40 TABLET, FILM COATED ORAL DAILY
Qty: 90 TABLET | Refills: 3 | Status: SHIPPED | OUTPATIENT
Start: 2025-07-29

## 2025-08-05 ENCOUNTER — APPOINTMENT (OUTPATIENT)
Dept: NEUROLOGY | Facility: CLINIC | Age: 70
End: 2025-08-05
Payer: MEDICARE

## 2025-08-06 NOTE — PROGRESS NOTES
HPI    70 y.o. male being seen with the following problem list:    Problem list:  Hip fracture 6/2024  Bothersome urinary frequency     07/17/24 -seen in consultation.  At baseline has some bothersome urinary frequency, but since his pelvic fracture is gotten much worse.  Is now getting somewhat better.  Started on Flomax but this did not help.  PVR today 64 ml.  No gross hematuria.  He reports no gross hematuria around the time of his pelvic fracture.  No UTIs.  Is BRCA positive.  No history of prostate cancer.     09/11/24 - here for cysto. OAB symptoms are better on vesicare, but not where he wants to be.      01/15/25 - Patient is now s/p right hip removal of hardware and right total hip arthroplasty, ORIF R acetabulum on 9/25 by Dr. Kerr. Feels like his urinary symptom have improved, frequency improved 4-5x during the day, bothered by nocturia 3-4x. Advise to stop Vesicare, will order Oxybutynin IR 5mg, he can take 1-3 pills qhs, will slowly increase.      04/03/25 - Seen today over telehealth, performed this visit using real-time telehealth tools, including an audio/video connection between Nasir Camacho at home and Landon Augustin MD at [facility]. Consent for telemedicine visit was obtained. Has been having daytime urgency and urge incontinence, not new. Nocturia down to 2x on Oxybutynin IR. Reports Red specs in urine.     CT A/P w contrast 11/07/24  IMPRESSION:  A 3-4 mm stone is in the urinary bladder, new from CT 10 September  2024. I suspect it has very recently passed from the right side  There is mild right hydroureteronephrosis and a delayed right  nephrogram with a 3 mm or smaller, too small to measure attenuation  stone in the distal right ureter within 5-7 cm of the UVJ. This  distal right ureteral stone is sitting dependently in the dilated  distal right ureter. It may be that the stone that is already in the  urinary bladder was the cause of the hydroureteronephrosis given that  the  stone in the distal right ureter does not appear lodged but  rather sitting dependently  No other acute findings  No urine leak/urinoma  No left hydroureteronephrosis  No separate acute process away from the right side of the upper tract.      05/07/25 - the patient is taking oxybutynin XL 15 mg daily. The patient has urinary urgency and frequency. Cysto today shows bilobar obstruction, the bladder is heavily trabeculated with numerous small stones in the bottom of the bladder and significant circumferential intravesical protrusion of prostate.      5/16/25 CTU   1.  Nonobstructing renal calculi are again visualized in the bilateral kidneys measuring up to 0.7 cm in the left inferior renal pole and 0.6 cm in the right inferior renal pole. No evidence of obstructing renal calculi or hydroureteronephrosis bilaterally.  2. Circumferential urinary bladder wall thickening and trabeculation with a few urinary bladder wall diverticuli c/w obstruction  3. No abnormal intraluminal filling defects within the contrast opacified portions of the bilateral pelvocaliceal system, bilateral ureters and urinary bladder on delayed phase imaging.     06/04/25 - Seen today over telehealth, performed this visit using real-time telehealth tools, including an audio/video connection between Nasir Camacho at home and Landon Augustin MD at [facility]. Consent for telemedicine visit was obtained.   Symptoms improved, nocturia down to 1x, some urge incontinence but much improved. On Oxybutynin XL 15mg daily, has some s/e like dry mouth but manageable. Would like to reduce dose, will order Oxybutynin 10mg for him.     08/07/25 - Seen today over telehealth, performed this visit using real-time telehealth tools, including an audio/video connection between Nasir Camacho at home and Landon Augustin MD at [facility]. Consent for telemedicine visit was obtained.   Nocturia 1x. Thinks he passed his stones, no flank pain or infection.  Stopped taking Oxybutynin 10mg.      Lab Results   Component Value Date    PSA 1.94 07/17/2024    PSA 1.91 04/01/2024    PSA 3.87 03/29/2021    PSA 1.32 06/25/2018              Current Medications:  Current Medications[1]     Active Problems:  Nasir Camacho is a 70 y.o. male with the following Problems and Medications.  Problem List[2]  Current Medications[3]    PMH:  Medical History[4]    PSH:  Surgical History[5]    FMH:  Family History[6]    SHx:  Social History[7]    Allergies:  RX Allergies[8]      Assessment/Plan  LUTS fair stable, no bothersome symptoms. Stopped taking Oxybutynin, happy with his current urination.     History of kidney stone, no flank pain or infection. He would like to continue observation.     He elected to FU prn.     Scribe Attestation  By signing my name below, I, Daysi Brown, Brandt, attest that this documentation has been prepared under the direction and in the presence of Landon Augustin MD.           [1]   Current Outpatient Medications   Medication Sig Dispense Refill    acetaminophen (Tylenol) 325 mg tablet Take 2 tablets (650 mg) by mouth every 6 hours if needed for mild pain (1 - 3). 60 tablet 3    amLODIPine (Norvasc) 10 mg tablet 1/2 tablet or 5 mg every day (Patient taking differently: Patient is currently taking #1 tablet daily) 90 tablet 3    aspirin 81 mg chewable tablet Chew 1 tablet (81 mg) once daily.      atorvastatin (Lipitor) 40 mg tablet TAKE 1 TABLET BY MOUTH EVERY DAY 90 tablet 3    calcium carbonate-vitamin D3 (Oscal-500) 500 mg-10 mcg (400 unit) tablet Take 1 tablet by mouth 2 times a day. 60 tablet 3    carvedilol (Coreg) 3.125 mg tablet TAKE 1 TABLET BY MOUTH TWICE A DAY WITH FOOD 180 tablet 3    cholecalciferol (Vitamin D-3) 25 MCG (1000 UT) tablet Take 1 tablet (25 mcg) by mouth once daily.      citalopram (CeleXA) 20 mg tablet TAKE 1 TABLET BY MOUTH EVERY DAY 90 tablet 3    losartan (Cozaar) 50 mg tablet TAKE 1 TABLET BY MOUTH EVERY DAY 90 tablet 3     mv-min-folic-K1-lycopen-lutein (Centrum Silver Men) 463--300 mcg tablet Take 1 tablet by mouth once daily. Do not fill before October 13, 2024.       No current facility-administered medications for this visit.   [2]   Patient Active Problem List  Diagnosis    Subacromial bursitis    Stroke (Multi)    Sprain of shoulder    Skin changes due to chronic exposure to nonionizing radiation, unspecified    Sensation of foreign body in eye    S/P TAVR (transcatheter aortic valve replacement)    Pulmonary nodule    Prostate disorder    Pain of right lower extremity    Other seborrheic keratosis    Inflamed seborrheic keratosis    Neuropathic pain of right lower extremity    Nephrolithiasis    Myopia with presbyopia    Myopia of both eyes    Melanocytic nevi of other parts of face    Laceration of right thumb    Hyperthyroidism    Hypercholesterolemia    Hordeolum externum of right upper eyelid    History of non-ST elevation myocardial infarction (NSTEMI)    Hemiparesis affecting dominant side as late effect of stroke (Multi)    Hematuria    Hemangioma of skin and subcutaneous tissue    Headache    Essential hypertension    Elevated PSA    Depression    MGD (meibomian gland disease)    Arteriosclerotic cardiovascular disease (ASCVD)    Accelerated hypertension    Abdominal pain    ICH (intracerebral hemorrhage) (Multi)    Fall, initial encounter    Fracture of anterior wall of acetabulum    Urinary frequency    Screening PSA (prostate specific antigen)    Closed displaced associated transverse-posterior fracture of right acetabulum    Post-traumatic osteoarthritis of right hip    Presence of retained hardware    Benign prostatic hyperplasia (BPH) with urinary urgency    Hip osteoarthritis    Acute right hip pain   [3]   Current Outpatient Medications   Medication Sig Dispense Refill    acetaminophen (Tylenol) 325 mg tablet Take 2 tablets (650 mg) by mouth every 6 hours if needed for mild pain (1 - 3). 60 tablet 3     amLODIPine (Norvasc) 10 mg tablet 1/2 tablet or 5 mg every day (Patient taking differently: Patient is currently taking #1 tablet daily) 90 tablet 3    aspirin 81 mg chewable tablet Chew 1 tablet (81 mg) once daily.      atorvastatin (Lipitor) 40 mg tablet TAKE 1 TABLET BY MOUTH EVERY DAY 90 tablet 3    calcium carbonate-vitamin D3 (Oscal-500) 500 mg-10 mcg (400 unit) tablet Take 1 tablet by mouth 2 times a day. 60 tablet 3    carvedilol (Coreg) 3.125 mg tablet TAKE 1 TABLET BY MOUTH TWICE A DAY WITH FOOD 180 tablet 3    cholecalciferol (Vitamin D-3) 25 MCG (1000 UT) tablet Take 1 tablet (25 mcg) by mouth once daily.      citalopram (CeleXA) 20 mg tablet TAKE 1 TABLET BY MOUTH EVERY DAY 90 tablet 3    losartan (Cozaar) 50 mg tablet TAKE 1 TABLET BY MOUTH EVERY DAY 90 tablet 3    mv-min-folic-K1-lycopen-lutein (Centrum Silver Men) 007--300 mcg tablet Take 1 tablet by mouth once daily. Do not fill before October 13, 2024.       No current facility-administered medications for this visit.   [4]   Past Medical History:  Diagnosis Date    Aortic stenosis     s/p TAVR 6/29/21    BRCA positive     Chronic pain disorder     Coronary artery disease     COVID-19 09/23/2022    Displaced fracture of right acetabulum     Graves disease     Hip fracture (Multi)     Hyperlipidemia     Hypertension     Hyperthyroidism     Myocardial infarction (Multi) 1999    acute plaque rupture in the LAD    Pelvic fracture (Multi)     Pulmonary nodule     stable    Stroke (Multi) 05/2016    hemorrhagic stroke with right hemiparesis in 2016   [5]   Past Surgical History:  Procedure Laterality Date    ANKLE SURGERY      CARDIAC CATHETERIZATION      CARDIAC VALVE REPLACEMENT  06/29/2021    transcatheter aortic valve replacement    CORONARY ANGIOPLASTY WITH STENT PLACEMENT  10/06/2014    Cath Stent Placement    CT ANGIO NECK  07/18/2021    CT NECK ANGIO W AND WO IV CONTRAST 7/18/2021 [facility] CLINICAL LEGAC[facility]    CT HEAD ANGIO W AND WO IV CONTRAST   07/18/2021    CT HEAD ANGIO W AND WO IV CONTRAST 7/18/2021 [facility] CLINICAL LEGAC[facility]    FEMUR FRACTURE SURGERY  11/06/2014    Femur Repair   [6] No family history on file.  [7]   Social History  Tobacco Use    Smoking status: Never    Smokeless tobacco: Never   Substance Use Topics    Alcohol use: Yes     Comment: one drink weekly    Drug use: Never   [8]   Allergies  Allergen Reactions    Penicillin Unknown

## 2025-08-07 ENCOUNTER — TELEMEDICINE (OUTPATIENT)
Dept: UROLOGY | Facility: HOSPITAL | Age: 70
End: 2025-08-07
Payer: MEDICARE

## 2025-08-07 ENCOUNTER — APPOINTMENT (OUTPATIENT)
Dept: PRIMARY CARE | Facility: CLINIC | Age: 70
End: 2025-08-07
Payer: MEDICARE

## 2025-08-07 VITALS
TEMPERATURE: 96.9 F | OXYGEN SATURATION: 98 % | HEART RATE: 57 BPM | HEIGHT: 71 IN | DIASTOLIC BLOOD PRESSURE: 61 MMHG | BODY MASS INDEX: 21.28 KG/M2 | SYSTOLIC BLOOD PRESSURE: 129 MMHG | WEIGHT: 152 LBS

## 2025-08-07 DIAGNOSIS — Z12.11 ENCOUNTER FOR SCREENING FOR MALIGNANT NEOPLASM OF COLON: ICD-10-CM

## 2025-08-07 DIAGNOSIS — I63.9 CEREBROVASCULAR ACCIDENT (CVA), UNSPECIFIED MECHANISM (MULTI): ICD-10-CM

## 2025-08-07 DIAGNOSIS — N20.0 KIDNEY STONES: ICD-10-CM

## 2025-08-07 DIAGNOSIS — R26.9 GAIT ABNORMALITY: ICD-10-CM

## 2025-08-07 DIAGNOSIS — R35.1 BPH ASSOCIATED WITH NOCTURIA: Primary | ICD-10-CM

## 2025-08-07 DIAGNOSIS — I69.351 HEMIPLEGIA AND HEMIPARESIS FOLLOWING CEREBRAL INFARCTION AFFECTING RIGHT DOMINANT SIDE (MULTI): ICD-10-CM

## 2025-08-07 DIAGNOSIS — G20.A1 PARKINSON'S DISEASE WITHOUT FLUCTUATING MANIFESTATIONS, UNSPECIFIED WHETHER DYSKINESIA PRESENT: ICD-10-CM

## 2025-08-07 DIAGNOSIS — Z95.2 S/P TAVR (TRANSCATHETER AORTIC VALVE REPLACEMENT): ICD-10-CM

## 2025-08-07 DIAGNOSIS — R53.1 RIGHT SIDED WEAKNESS: ICD-10-CM

## 2025-08-07 DIAGNOSIS — Z00.00 MEDICARE ANNUAL WELLNESS VISIT, SUBSEQUENT: Primary | ICD-10-CM

## 2025-08-07 DIAGNOSIS — N40.1 BPH ASSOCIATED WITH NOCTURIA: Primary | ICD-10-CM

## 2025-08-07 PROBLEM — I69.359 HEMIPARESIS AFFECTING DOMINANT SIDE AS LATE EFFECT OF STROKE (MULTI): Status: RESOLVED | Noted: 2024-03-22 | Resolved: 2025-08-07

## 2025-08-07 PROCEDURE — 1170F FXNL STATUS ASSESSED: CPT | Performed by: STUDENT IN AN ORGANIZED HEALTH CARE EDUCATION/TRAINING PROGRAM

## 2025-08-07 PROCEDURE — 99213 OFFICE O/P EST LOW 20 MIN: CPT | Performed by: UROLOGY

## 2025-08-07 PROCEDURE — 1160F RVW MEDS BY RX/DR IN RCRD: CPT | Performed by: STUDENT IN AN ORGANIZED HEALTH CARE EDUCATION/TRAINING PROGRAM

## 2025-08-07 PROCEDURE — 1158F ADVNC CARE PLAN TLK DOCD: CPT | Performed by: STUDENT IN AN ORGANIZED HEALTH CARE EDUCATION/TRAINING PROGRAM

## 2025-08-07 PROCEDURE — 3078F DIAST BP <80 MM HG: CPT | Performed by: STUDENT IN AN ORGANIZED HEALTH CARE EDUCATION/TRAINING PROGRAM

## 2025-08-07 PROCEDURE — 1159F MED LIST DOCD IN RCRD: CPT | Performed by: STUDENT IN AN ORGANIZED HEALTH CARE EDUCATION/TRAINING PROGRAM

## 2025-08-07 PROCEDURE — 3074F SYST BP LT 130 MM HG: CPT | Performed by: STUDENT IN AN ORGANIZED HEALTH CARE EDUCATION/TRAINING PROGRAM

## 2025-08-07 PROCEDURE — 1036F TOBACCO NON-USER: CPT | Performed by: UROLOGY

## 2025-08-07 PROCEDURE — 3008F BODY MASS INDEX DOCD: CPT | Performed by: STUDENT IN AN ORGANIZED HEALTH CARE EDUCATION/TRAINING PROGRAM

## 2025-08-07 PROCEDURE — 1036F TOBACCO NON-USER: CPT | Performed by: STUDENT IN AN ORGANIZED HEALTH CARE EDUCATION/TRAINING PROGRAM

## 2025-08-07 PROCEDURE — 1126F AMNT PAIN NOTED NONE PRSNT: CPT | Performed by: STUDENT IN AN ORGANIZED HEALTH CARE EDUCATION/TRAINING PROGRAM

## 2025-08-07 PROCEDURE — G0439 PPPS, SUBSEQ VISIT: HCPCS | Performed by: STUDENT IN AN ORGANIZED HEALTH CARE EDUCATION/TRAINING PROGRAM

## 2025-08-07 PROCEDURE — 99214 OFFICE O/P EST MOD 30 MIN: CPT | Performed by: STUDENT IN AN ORGANIZED HEALTH CARE EDUCATION/TRAINING PROGRAM

## 2025-08-07 PROCEDURE — 1123F ACP DISCUSS/DSCN MKR DOCD: CPT | Performed by: STUDENT IN AN ORGANIZED HEALTH CARE EDUCATION/TRAINING PROGRAM

## 2025-08-07 ASSESSMENT — ACTIVITIES OF DAILY LIVING (ADL)
TAKING_MEDICATION: INDEPENDENT
GROCERY_SHOPPING: INDEPENDENT
DRESSING: INDEPENDENT
BATHING: INDEPENDENT
MANAGING_FINANCES: INDEPENDENT
DOING_HOUSEWORK: INDEPENDENT

## 2025-08-07 ASSESSMENT — ENCOUNTER SYMPTOMS
OCCASIONAL FEELINGS OF UNSTEADINESS: 1
LOSS OF SENSATION IN FEET: 0
DEPRESSION: 0

## 2025-08-07 ASSESSMENT — PAIN SCALES - GENERAL: PAINLEVEL_OUTOF10: 0-NO PAIN

## 2025-08-07 NOTE — PATIENT INSTRUCTIONS
Your blood work is up to date; no need for additional draw at this appointment    Your medications are up to date today, I will renew them when a refill is due.     I have ordered a screening colonoscopy. Please call [phone] to schedule the appointment. I recommend being the first appointment of the day, if possible.     I have referred you to physical therapy and occupation therapy for your balance issues. Please call [phone] to schedule at a convenient  location.      Follow up with your specialists as previously scheduled.     See me in 6 months.

## 2025-08-07 NOTE — PROGRESS NOTES
"Subjective   Reason for Visit: Nasir Camacho is an70 y.o. male who presents for a Medicare Wellness visit.    Past Medical, Surgical, and Family History reviewed and updated in chart.    Reviewed all medications by prescribing practitioner or clinical pharmacist (such as prescriptions, OTCs, herbal therapies and supplements) and documented in the medical record.    History of Present Illness  Nasir Camacho is a 70 year old male who presents for a Medicare wellness visit and multi-issue follow-up.    See my previous note. He experienced a fall within the last year, resulting in a broken pelvis, and feels unsteady when walking. He is concerned about falling again and is interested in discussing potential foot bracing to improve stability. Neurology has evaluated him for a soft voice, mild tremor, and balance issues, diagnosing him with Parkinsonism. He is not on medication for this condition and follows up with neurology biannually.    He has a significant cardiovascular history, including a TAVR procedure, and follows up with his cardiologist annually. He is unsure about his current medication regimen but believes his cardiovascular medications have been renewed.    He exercises three times a week, performs all activities of daily living independently, and engages in higher-level activities such as driving and managing finances without assistance. He feels safe at home and describes his health as 'fair'. He does not use a cane regularly.      PMHx, FHx, Social Hx, Surg Hx personally reviewed at this appointment. No pertinent findings and/or changes from prior (if applicable).    ROS: Denies wt gain/loss f/c HA LoC CP SOB NVDC. See HPI above, and scanned sheet (if applicable). All other systems are reviewed and are without complaint.       Objective     /61   Pulse 57   Temp 36.1 °C (96.9 °F)   Ht 1.803 m (5' 11\")   Wt 68.9 kg (152 lb)   SpO2 98%   BMI 21.20 kg/m²      Physical Exam  Gen: elderly " appearance. AAO x 3  HEENT: NC/AT. Anicteric sclera, symmetric pupils.   Neck: Soft, supple. No LAD. No goiter.   CV: RRR nl s1s2 no m/r/g  Pulm: CTAB no w/r/r, good air exchange  GI: soft NTND BS+ no hsm  Ext: WWP no edema  Neuro: Mild R sided weakness. No tremor. Mild rigidity worse RUE.   MSK: 5/5 strength however RUE rigidity.       GENERAL: Alert, cooperative, well developed, well nourished, no acute distress  HEENT: Normocephalic, normal oropharynx, moist mucous membranes  CHEST: Clear to auscultation bilaterally, no wheezes, rhonchi, or crackles  CARDIOVASCULAR: Normal heart rate and rhythm, S1 and S2 normal without murmurs  ABDOMEN: Soft, non-tender, non-distended, without organomegaly, normal bowel sounds  EXTREMITIES: No cyanosis or edema  NEUROLOGICAL: Cranial nerves grossly intact, moves all extremities without gross motor or sensory deficit    Current Outpatient Medications   Medication Instructions    acetaminophen (TYLENOL) 650 mg, oral, Every 6 hours PRN    amLODIPine (Norvasc) 10 mg tablet 1/2 tablet or 5 mg every day    aspirin 81 mg chewable tablet 1 tablet, oral, Daily    atorvastatin (LIPITOR) 40 mg, oral, Daily    calcium carbonate-vitamin D3 (Oscal-500) 500 mg-10 mcg (400 unit) tablet 1 tablet, oral, 2 times daily    carvedilol (COREG) 3.125 mg, oral, Take with food.    cholecalciferol (Vitamin D-3) 25 MCG (1000 UT) tablet 1 tablet, oral, Daily    citalopram (CELEXA) 20 mg, oral, Daily    losartan (COZAAR) 50 mg, oral, Daily    mv-min-folic-K1-lycopen-lutein (Centrum Silver Men) 096--300 mcg tablet 1 tablet, oral, Daily        Lab Results   Component Value Date    WBC 5.0 04/09/2025    HGB 12.8 (L) 04/09/2025    HCT 38.3 (L) 04/09/2025     04/09/2025    CHOL 124 04/01/2024    TRIG 43 04/01/2024    HDL 60.8 04/01/2024    ALT 17 04/09/2025    AST 19 04/09/2025     04/09/2025    K 5.0 04/09/2025     04/09/2025    CREATININE 1.01 04/09/2025    BUN 26 (H) 04/09/2025    CO2  27 04/09/2025    TSH 1.92 04/01/2024    PSA 1.94 07/17/2024    INR 1.1 06/23/2024     par     Assessment & Plan  MWV completed    Right-sided weakness post-hemorrhagic stroke  Persistent right-sided weakness following a past hemorrhagic stroke. Expressed interest in bracing or therapy for the right leg.  - Refer to physical and occupational therapy for right-sided weakness.  - Consider fitting for an orthotic or AFO.    Parkinsonism  Diagnosed with Parkinsonism, not Parkinson's disease. Symptoms include soft voice, mild tremor, and balance issues. No medication prescribed by neurology. Follow-up with neurology scheduled for December.  - Continue follow-up with neurology.  - Refer to physical therapy for Parkinsonism symptoms.    Status post TAVR with stable cardiovascular status and hypertension  Status post TAVR with well-managed cardiovascular status. Blood pressure at goal. Cardiologist follow-up in one year with repeat echocardiogram planned.  - Continue current cardiovascular medications.  - Follow up with cardiologist in one year.  - Repeat echocardiogram in one year.    History of pelvic fracture due to fall  Pelvic fracture from a fall while changing a light bulb on a ladder. Reports feeling unsteady and concerned about falling.  - Use a cane for stability to prevent falls.  - Refer to physical and occupational therapy for balance and strength training.    # Health Maintenance  - routine blood work  - Colon Cancer Screening: due, ordered today   - PSA: no longer indicated due to age   - Immunizations:  UTD  - AAA screening:  not indicated     Assessment & Plan  Parkinson's disease without fluctuating manifestations, unspecified whether dyskinesia present    Orders:    Referral to Physical Therapy; Future    Referral to Occupational Therapy; Future    Cerebrovascular accident (CVA), unspecified mechanism (Multi)    Orders:    Referral to Physical Therapy; Future    Referral to Occupational Therapy;  Future    Right sided weakness    Orders:    Referral to Physical Therapy; Future    Referral to Occupational Therapy; Future    Gait abnormality    Orders:    Referral to Physical Therapy; Future    Referral to Occupational Therapy; Future    S/P TAVR (transcatheter aortic valve replacement)         Medicare annual wellness visit, subsequent         Encounter for screening for malignant neoplasm of colon    Orders:    Colonoscopy Screening; Average Risk Patient; Future         Antoine Medeiros MD            This medical note was created with the assistance of artificial intelligence (AI) for documentation purposes. The content has been reviewed and confirmed by the healthcare provider for accuracy and completeness. Patient consented to the use of audio recording and use of AI during their visit.

## 2025-08-12 DIAGNOSIS — I10 ACCELERATED HYPERTENSION: ICD-10-CM

## 2025-08-12 RX ORDER — LOSARTAN POTASSIUM 50 MG/1
50 TABLET ORAL DAILY
Qty: 30 TABLET | Refills: 6 | Status: SHIPPED | OUTPATIENT
Start: 2025-08-12

## 2025-12-09 ENCOUNTER — APPOINTMENT (OUTPATIENT)
Dept: NEUROLOGY | Facility: CLINIC | Age: 70
End: 2025-12-09
Payer: MEDICARE

## (undated) DEVICE — TIP,  ELECTRODE COATED INSULATED, EXTENDED, LF

## (undated) DEVICE — SUTURE, MONOCRYL, 2-0, 36 IN, CT-1, UNDYED

## (undated) DEVICE — APPLICATOR, CHLORAPREP, W/ORANGE TINT, 26ML

## (undated) DEVICE — Device

## (undated) DEVICE — SYRINGE, 35 CC, LUER LOCK, MONOJECT, LF

## (undated) DEVICE — SUTURE, PDS II, 0, 27 IN, CT-2, VIOLET

## (undated) DEVICE — SUTURE, ETHIBOND, 5, 30 IN, V40, MULTIPACK, GREEN

## (undated) DEVICE — DRESSING, MEPILEX BORDER, POST-OP AG, 4 X 10 IN

## (undated) DEVICE — ELECTRODE, ELECTROSURGICAL, BLADE, INSULATED, ENT/IMA, STERILE

## (undated) DEVICE — MAYO TRAY, LARGE

## (undated) DEVICE — INTERPULSE HANDPIECE SET W/ 10FT SUCTION TUBING

## (undated) DEVICE — NEEDLE, EPIDURAL, TUOHY, 18 G X 3.5 IN

## (undated) DEVICE — HIGH FLOW TIP FOR INTERPULSE HANDPIECE SET

## (undated) DEVICE — HOOD, SURGICAL, FLYTE HYBRID

## (undated) DEVICE — SPONGE, LAP, XRAY DECT, 18IN X 18IN, W/LOOP, STERILE

## (undated) DEVICE — BANDAGE, GAUZE, CONFORMING, KERLIX, 6 PLY, 4.5 IN X 4.1 YD

## (undated) DEVICE — DRAPE, TIBURON, SPLIT SHEET, REINF ADH STRIP, 77X122

## (undated) DEVICE — SUTURE, MONOCRYL, 4-0, 18 IN, PS2, UNDYED

## (undated) DEVICE — MANIFOLD, 4 PORT NEPTUNE STANDARD

## (undated) DEVICE — BIT, DRILL, CANNULATED, QUICK-COUPLING, 5 X 300 MM, STAINLESS STEEL

## (undated) DEVICE — STAPLER, SKIN PROXIMATE, 35 WIDE

## (undated) DEVICE — COVER, C-ARM W/CLIPS, OEC GE

## (undated) DEVICE — COVER, CART, 45 X 27 X 48 IN, CLEAR

## (undated) DEVICE — SYSTEM, ORTHOPEDIC SUCTION

## (undated) DEVICE — BLADE, SAW, RECIPROCATING, DOUBLE-SIDED, 70 X 12.5 X 1 MM, STAINLESS STEEL